# Patient Record
Sex: MALE | Race: WHITE | NOT HISPANIC OR LATINO | Employment: FULL TIME | URBAN - NONMETROPOLITAN AREA
[De-identification: names, ages, dates, MRNs, and addresses within clinical notes are randomized per-mention and may not be internally consistent; named-entity substitution may affect disease eponyms.]

---

## 2018-02-14 ENCOUNTER — LAB (OUTPATIENT)
Dept: LAB | Facility: OTHER | Age: 58
End: 2018-02-14

## 2018-02-14 DIAGNOSIS — Z12.5 SCREENING PSA (PROSTATE SPECIFIC ANTIGEN): ICD-10-CM

## 2018-02-14 DIAGNOSIS — Z00.00 ROUTINE GENERAL MEDICAL EXAMINATION AT A HEALTH CARE FACILITY: ICD-10-CM

## 2018-02-14 DIAGNOSIS — Z00.00 ROUTINE GENERAL MEDICAL EXAMINATION AT A HEALTH CARE FACILITY: Primary | ICD-10-CM

## 2018-02-14 LAB
ALBUMIN SERPL-MCNC: 4.4 G/DL (ref 3.2–5.5)
ALBUMIN/GLOB SERPL: 1.6 G/DL (ref 1–3)
ALP SERPL-CCNC: 33 U/L (ref 15–121)
ALT SERPL W P-5'-P-CCNC: 26 U/L (ref 10–60)
ANION GAP SERPL CALCULATED.3IONS-SCNC: 8 MMOL/L (ref 5–15)
AST SERPL-CCNC: 24 U/L (ref 10–60)
BASOPHILS # BLD AUTO: 0.03 10*3/MM3 (ref 0–0.2)
BASOPHILS NFR BLD AUTO: 0.6 % (ref 0–2)
BILIRUB SERPL-MCNC: 1.1 MG/DL (ref 0.2–1)
BUN BLD-MCNC: 15 MG/DL (ref 8–25)
BUN/CREAT SERPL: 16.7 (ref 7–25)
CALCIUM SPEC-SCNC: 8.8 MG/DL (ref 8.4–10.8)
CHLORIDE SERPL-SCNC: 107 MMOL/L (ref 100–112)
CHOLEST SERPL-MCNC: 196 MG/DL (ref 150–200)
CO2 SERPL-SCNC: 25 MMOL/L (ref 20–32)
CREAT BLD-MCNC: 0.9 MG/DL (ref 0.4–1.3)
DEPRECATED RDW RBC AUTO: 43.6 FL (ref 35.1–43.9)
EOSINOPHIL # BLD AUTO: 0.12 10*3/MM3 (ref 0–0.7)
EOSINOPHIL NFR BLD AUTO: 2.3 % (ref 0–7)
ERYTHROCYTE [DISTWIDTH] IN BLOOD BY AUTOMATED COUNT: 13.1 % (ref 11.5–14.5)
GFR SERPL CREATININE-BSD FRML MDRD: 86 ML/MIN/1.73 (ref 56–130)
GLOBULIN UR ELPH-MCNC: 2.8 GM/DL (ref 2.5–4.6)
GLUCOSE BLD-MCNC: 100 MG/DL (ref 70–100)
HCT VFR BLD AUTO: 43.9 % (ref 39–49)
HDLC SERPL-MCNC: 47 MG/DL (ref 35–100)
HGB BLD-MCNC: 15.1 G/DL (ref 13.7–17.3)
LDLC SERPL CALC-MCNC: 134 MG/DL
LDLC/HDLC SERPL: 2.85 {RATIO}
LYMPHOCYTES # BLD AUTO: 1.47 10*3/MM3 (ref 0.6–4.2)
LYMPHOCYTES NFR BLD AUTO: 27.7 % (ref 10–50)
MCH RBC QN AUTO: 32.1 PG (ref 26.5–34)
MCHC RBC AUTO-ENTMCNC: 34.4 G/DL (ref 31.5–36.3)
MCV RBC AUTO: 93.2 FL (ref 80–98)
MONOCYTES # BLD AUTO: 0.48 10*3/MM3 (ref 0–0.9)
MONOCYTES NFR BLD AUTO: 9 % (ref 0–12)
NEUTROPHILS # BLD AUTO: 3.21 10*3/MM3 (ref 2–8.6)
NEUTROPHILS NFR BLD AUTO: 60.4 % (ref 37–80)
PLATELET # BLD AUTO: 230 10*3/MM3 (ref 150–450)
PMV BLD AUTO: 9 FL (ref 8–12)
POTASSIUM BLD-SCNC: 3.9 MMOL/L (ref 3.4–5.4)
PROT SERPL-MCNC: 7.2 G/DL (ref 6.7–8.2)
PSA SERPL-MCNC: 0.78 NG/ML (ref 0–4)
RBC # BLD AUTO: 4.71 10*6/MM3 (ref 4.37–5.74)
SODIUM BLD-SCNC: 140 MMOL/L (ref 134–146)
T4 FREE SERPL-MCNC: 1.03 NG/DL (ref 0.78–2.19)
TRIGL SERPL-MCNC: 76 MG/DL (ref 35–160)
TSH SERPL DL<=0.05 MIU/L-ACNC: 2 MIU/ML (ref 0.46–4.68)
VLDLC SERPL-MCNC: 15.2 MG/DL
WBC NRBC COR # BLD: 5.31 10*3/MM3 (ref 3.2–9.8)

## 2018-02-14 PROCEDURE — 80061 LIPID PANEL: CPT | Performed by: INTERNAL MEDICINE

## 2018-02-14 PROCEDURE — 80053 COMPREHEN METABOLIC PANEL: CPT | Performed by: INTERNAL MEDICINE

## 2018-02-14 PROCEDURE — 85025 COMPLETE CBC W/AUTO DIFF WBC: CPT | Performed by: INTERNAL MEDICINE

## 2018-02-14 PROCEDURE — 84443 ASSAY THYROID STIM HORMONE: CPT | Performed by: INTERNAL MEDICINE

## 2018-02-14 PROCEDURE — 36415 COLL VENOUS BLD VENIPUNCTURE: CPT | Performed by: INTERNAL MEDICINE

## 2018-02-14 PROCEDURE — 84439 ASSAY OF FREE THYROXINE: CPT | Performed by: INTERNAL MEDICINE

## 2018-02-14 PROCEDURE — G0103 PSA SCREENING: HCPCS | Performed by: INTERNAL MEDICINE

## 2018-02-16 ENCOUNTER — OFFICE VISIT (OUTPATIENT)
Dept: FAMILY MEDICINE CLINIC | Facility: CLINIC | Age: 58
End: 2018-02-16

## 2018-02-16 VITALS
HEART RATE: 70 BPM | OXYGEN SATURATION: 98 % | DIASTOLIC BLOOD PRESSURE: 80 MMHG | BODY MASS INDEX: 29.01 KG/M2 | TEMPERATURE: 96.9 F | HEIGHT: 71 IN | WEIGHT: 207.2 LBS | SYSTOLIC BLOOD PRESSURE: 128 MMHG

## 2018-02-16 DIAGNOSIS — Z56.6 STRESS AT WORK: ICD-10-CM

## 2018-02-16 DIAGNOSIS — Z82.49 FAMILY HISTORY OF CORONARY ARTERY DISEASE: Chronic | ICD-10-CM

## 2018-02-16 DIAGNOSIS — R00.2 PALPITATIONS: Chronic | ICD-10-CM

## 2018-02-16 DIAGNOSIS — R06.09 DYSPNEA ON EXERTION: Primary | ICD-10-CM

## 2018-02-16 DIAGNOSIS — R07.89 CHEST WALL PAIN: ICD-10-CM

## 2018-02-16 PROCEDURE — 99214 OFFICE O/P EST MOD 30 MIN: CPT | Performed by: INTERNAL MEDICINE

## 2018-02-16 PROCEDURE — 93000 ELECTROCARDIOGRAM COMPLETE: CPT | Performed by: INTERNAL MEDICINE

## 2018-02-16 SDOH — HEALTH STABILITY - MENTAL HEALTH: OTHER PHYSICAL AND MENTAL STRAIN RELATED TO WORK: Z56.6

## 2018-02-16 NOTE — PROGRESS NOTES
Subjective     History of Present Illness     Ayan Shipman is a 57 y.o. male who is a regular patient of Dr. Merlin Marshall who reports occasional episodes of relatively mild dyspnea.  It is usually dyspnea with exertion, but he has experienced some episodes when in bed.  He denies traditional chest pain or pressure, although, he also reports 3-4 week history of intermittent episodes of feeling lightheaded.  He has occasionally experienced some right-sided chest her chest wall pain with inspirations.  These are relatively mild and rare.  He is concerned due to strong family history of stroke and heart attack.  One of his brothers had a heart attack two years ago.  His oldest brother is  from an apparent postoperative arrhythmia.      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   He reports he was sitting in the truck about one month ago working on the computer when he felt lightheaded, which resolved in a few seconds.  He used to read meters, which required a lot of walking.  His physical activity has decreased with the change in his job requirements, although, it is more stressful.  He was concerned stress is a factor in his current symptoms.  He has had occasional palpitations for years.  He wore a Holter monitor a few years ago and was given reassurance.  A week ago, he awoke with dyspnea, which improved the next day, but he has noticed recurrence occasionally since Saturday.  His oxygen saturation today is 98%.  He reports he sleeps well.  He denies alcohol or tabacco use.       He had fasting labs two days ago ordered by Dr. Marshall prior to his scheduled wellness exam next week.  Labs appear unremarkable, with no evidence of anemia or electrolyte abnormalities.    Total bilirubin is 1.1.        Review of Systems   Constitutional: Negative for chills, fatigue and fever.   HENT: Negative for congestion, ear pain, postnasal drip, sinus pressure and sore throat.    Respiratory: Positive for  "shortness of breath. Negative for cough and wheezing.    Cardiovascular: Negative for chest pain, palpitations and leg swelling.        Rare episodes of right lateral chest  pain with deep inspiration.   Gastrointestinal: Negative for abdominal pain, blood in stool, constipation, diarrhea, nausea and vomiting.   Endocrine: Negative for cold intolerance, heat intolerance, polydipsia and polyuria.   Genitourinary: Negative for dysuria, frequency, hematuria and urgency.   Skin: Negative for rash.   Neurological: Positive for light-headedness. Negative for syncope and weakness.   Psychiatric/Behavioral: Negative for dysphoric mood and sleep disturbance. The patient is not nervous/anxious.           Objective     Vitals:    02/16/18 1410   BP: 128/80   Pulse: 70   Temp: 96.9 °F (36.1 °C)   TempSrc: Oral   SpO2: 98%   Weight: 94 kg (207 lb 3.2 oz)   Height: 180.3 cm (71\")     Physical Exam   Constitutional: He is oriented to person, place, and time. He appears well-developed and well-nourished. No distress.   HENT:   Head: Normocephalic and atraumatic.   Nose: Right sinus exhibits no maxillary sinus tenderness and no frontal sinus tenderness. Left sinus exhibits no maxillary sinus tenderness and no frontal sinus tenderness.   Mouth/Throat: Uvula is midline, oropharynx is clear and moist and mucous membranes are normal. No oral lesions. No tonsillar exudate.   Eyes: Conjunctivae and EOM are normal. Pupils are equal, round, and reactive to light.   Neck: Trachea normal. Neck supple. No JVD present. Carotid bruit is not present. No tracheal deviation present. No thyroid mass and no thyromegaly present.   Cardiovascular: Normal rate, regular rhythm, normal heart sounds and intact distal pulses.   No extrasystoles are present. PMI is not displaced.    No murmur heard.  Pulmonary/Chest: Effort normal and breath sounds normal. No accessory muscle usage. No respiratory distress. He has no decreased breath sounds. He has no " wheezes. He has no rhonchi. He has no rales.   Abdominal: Soft. Bowel sounds are normal. He exhibits no distension. There is no hepatosplenomegaly. There is no tenderness.       Vascular Status -  His exam exhibits right foot vasculature normal. His exam exhibits no right foot edema. His exam exhibits left foot vasculature normal. His exam exhibits no left foot edema.  Lymphadenopathy:     He has no cervical adenopathy.   Neurological: He is alert and oriented to person, place, and time. No cranial nerve deficit. Coordination normal.   Skin: Skin is warm, dry and intact. No rash noted. No cyanosis. Nails show no clubbing.   Psychiatric: He has a normal mood and affect. His speech is normal and behavior is normal. Judgment and thought content normal.   Good eye contact.  Good insight.  He does not appear particularly stressed or depressed.   Vitals reviewed.      Future Appointments  Date Time Provider Department Center   2/16/2018 3:05 PM [facility] XR 1 [facility] X[facility] [place]   2/23/2018 8:45 AM Merlin Marshall MD [facility] POW None     Assessment/Plan        ECG 12 Lead  Date/Time: 2/16/2018 2:50 PM  Performed by: THELMA DE LA PAZ  Authorized by: THELMA DE LA PAZ   Comparison: not compared with previous ECG   Previous ECG: no previous ECG available  Rhythm: sinus rhythm  Rate: normal  Conduction: conduction normal  ST Segments: ST segments normal  T Waves: T waves normal  QRS axis: normal  Other: no other findings  Clinical impression: normal ECG            EKG is completed today and it is normal.  He will have chest x-ray today and we will refer to cardiologist.  With his risk factors including male over age 50 and strong family history, I recommended referral to Dr. Sullivan, cardiologist.  If his chronic episodic palpitations become more frequent or intense, he should mention this to Dr. Marshall or cardiologist.  We described worrisome cardiac/ anginal symptoms and recommended he seek immediate medical attention should  these occur.  He voices understanding and agreement.    I offered to start SSRI therapy due to his workplace stress and other life stressors.  He declines.  He wants to wait until he is evaluated by cardiology.        He has an appointment scheduled with Dr. Marshall next week for wellness exam.  He will discuss these issues with Dr. Polo and at that time.      Scribed for Dr. Marques by Whit Arreola [facility].     Diagnoses and all orders for this visit:    Dyspnea on exertion  -     XR Chest PA & Lateral  -     Ambulatory Referral to Cardiology    Chest wall pain  -     XR Chest PA & Lateral  -     Ambulatory Referral to Cardiology    Palpitations    Family history of coronary artery disease    Stress at work      Lab on 02/14/2018   Component Date Value Ref Range Status   • Free T4 02/14/2018 1.03  0.78 - 2.19 ng/dL Final   • Glucose 02/14/2018 100  70 - 100 mg/dL Final   • BUN 02/14/2018 15  8 - 25 mg/dL Final   • Creatinine 02/14/2018 0.90  0.40 - 1.30 mg/dL Final   • Sodium 02/14/2018 140  134 - 146 mmol/L Final   • Potassium 02/14/2018 3.9  3.4 - 5.4 mmol/L Final   • Chloride 02/14/2018 107  100 - 112 mmol/L Final   • CO2 02/14/2018 25.0  20.0 - 32.0 mmol/L Final   • Calcium 02/14/2018 8.8  8.4 - 10.8 mg/dL Final   • Total Protein 02/14/2018 7.2  6.7 - 8.2 g/dL Final   • Albumin 02/14/2018 4.40  3.20 - 5.50 g/dL Final   • ALT (SGPT) 02/14/2018 26  10 - 60 U/L Final   • AST (SGOT) 02/14/2018 24  10 - 60 U/L Final   • Alkaline Phosphatase 02/14/2018 33  15 - 121 U/L Final   • Total Bilirubin 02/14/2018 1.1* 0.2 - 1.0 mg/dL Final   • eGFR Non  Amer 02/14/2018 86  56 - 130 mL/min/1.73 Final   • Globulin 02/14/2018 2.8  2.5 - 4.6 gm/dL Final   • A/G Ratio 02/14/2018 1.6  1.0 - 3.0 g/dL Final   • BUN/Creatinine Ratio 02/14/2018 16.7  7.0 - 25.0 Final   • Anion Gap 02/14/2018 8.0  5.0 - 15.0 mmol/L Final   • PSA 02/14/2018 0.775  0.000 - 4.000 ng/mL Final   • Total Cholesterol 02/14/2018 196  150 - 200 mg/dL  Final   • Triglycerides 02/14/2018 76  35 - 160 mg/dL Final   • HDL Cholesterol 02/14/2018 47  35 - 100 mg/dL Final   • LDL Cholesterol  02/14/2018 134  mg/dL Final   • VLDL Cholesterol 02/14/2018 15.2  mg/dL Final   • LDL/HDL Ratio 02/14/2018 2.85   Final   • TSH 02/14/2018 2.000  0.460 - 4.680 mIU/mL Final   • WBC 02/14/2018 5.31  3.20 - 9.80 10*3/mm3 Final   • RBC 02/14/2018 4.71  4.37 - 5.74 10*6/mm3 Final   • Hemoglobin 02/14/2018 15.1  13.7 - 17.3 g/dL Final   • Hematocrit 02/14/2018 43.9  39.0 - 49.0 % Final   • MCV 02/14/2018 93.2  80.0 - 98.0 fL Final   • MCH 02/14/2018 32.1  26.5 - 34.0 pg Final   • MCHC 02/14/2018 34.4  31.5 - 36.3 g/dL Final   • RDW 02/14/2018 13.1  11.5 - 14.5 % Final   • RDW-SD 02/14/2018 43.6  35.1 - 43.9 fl Final   • MPV 02/14/2018 9.0  8.0 - 12.0 fL Final   • Platelets 02/14/2018 230  150 - 450 10*3/mm3 Final   • Neutrophil % 02/14/2018 60.4  37.0 - 80.0 % Final   • Lymphocyte % 02/14/2018 27.7  10.0 - 50.0 % Final   • Monocyte % 02/14/2018 9.0  0.0 - 12.0 % Final   • Eosinophil % 02/14/2018 2.3  0.0 - 7.0 % Final   • Basophil % 02/14/2018 0.6  0.0 - 2.0 % Final   • Neutrophils, Absolute 02/14/2018 3.21  2.00 - 8.60 10*3/mm3 Final   • Lymphocytes, Absolute 02/14/2018 1.47  0.60 - 4.20 10*3/mm3 Final   • Monocytes, Absolute 02/14/2018 0.48  0.00 - 0.90 10*3/mm3 Final   • Eosinophils, Absolute 02/14/2018 0.12  0.00 - 0.70 10*3/mm3 Final   • Basophils, Absolute 02/14/2018 0.03  0.00 - 0.20 10*3/mm3 Final   ]

## 2018-03-12 ENCOUNTER — OFFICE VISIT (OUTPATIENT)
Dept: FAMILY MEDICINE CLINIC | Facility: CLINIC | Age: 58
End: 2018-03-12

## 2018-03-12 VITALS
BODY MASS INDEX: 40.05 KG/M2 | HEIGHT: 60 IN | OXYGEN SATURATION: 99 % | SYSTOLIC BLOOD PRESSURE: 114 MMHG | DIASTOLIC BLOOD PRESSURE: 74 MMHG | WEIGHT: 204 LBS | HEART RATE: 78 BPM

## 2018-03-12 DIAGNOSIS — M15.9 PRIMARY OSTEOARTHRITIS INVOLVING MULTIPLE JOINTS: Primary | ICD-10-CM

## 2018-03-12 PROCEDURE — 99213 OFFICE O/P EST LOW 20 MIN: CPT | Performed by: INTERNAL MEDICINE

## 2018-03-12 RX ORDER — SILDENAFIL 100 MG/1
100 TABLET, FILM COATED ORAL DAILY PRN
Qty: 9 TABLET | Refills: 5 | Status: SHIPPED | OUTPATIENT
Start: 2018-03-12 | End: 2018-03-15 | Stop reason: SDUPTHER

## 2018-03-12 RX ORDER — MELOXICAM 15 MG/1
15 TABLET ORAL DAILY
Qty: 30 TABLET | Refills: 5 | Status: SHIPPED | OUTPATIENT
Start: 2018-03-12 | End: 2019-02-26 | Stop reason: SDUPTHER

## 2018-03-15 RX ORDER — SILDENAFIL 100 MG/1
100 TABLET, FILM COATED ORAL DAILY PRN
Qty: 8 TABLET | Refills: 5 | Status: SHIPPED | OUTPATIENT
Start: 2018-03-15 | End: 2019-02-26 | Stop reason: SDUPTHER

## 2019-02-18 DIAGNOSIS — Z12.5 ENCOUNTER FOR SPECIAL SCREENING EXAMINATION FOR NEOPLASM OF PROSTATE: ICD-10-CM

## 2019-02-18 DIAGNOSIS — Z00.00 ROUTINE GENERAL MEDICAL EXAMINATION AT A HEALTH CARE FACILITY: Primary | ICD-10-CM

## 2019-02-20 ENCOUNTER — LAB (OUTPATIENT)
Dept: LAB | Facility: OTHER | Age: 59
End: 2019-02-20

## 2019-02-20 DIAGNOSIS — Z00.00 ROUTINE GENERAL MEDICAL EXAMINATION AT A HEALTH CARE FACILITY: ICD-10-CM

## 2019-02-20 DIAGNOSIS — Z12.5 ENCOUNTER FOR SPECIAL SCREENING EXAMINATION FOR NEOPLASM OF PROSTATE: ICD-10-CM

## 2019-02-20 LAB
ALBUMIN SERPL-MCNC: 4.5 G/DL (ref 3.5–5)
ALBUMIN/GLOB SERPL: 1.7 G/DL (ref 1.1–1.8)
ALP SERPL-CCNC: 42 U/L (ref 38–126)
ALT SERPL W P-5'-P-CCNC: 25 U/L
ANION GAP SERPL CALCULATED.3IONS-SCNC: 8 MMOL/L (ref 5–15)
AST SERPL-CCNC: 27 U/L (ref 17–59)
BASOPHILS # BLD AUTO: 0.02 10*3/MM3 (ref 0–0.2)
BASOPHILS NFR BLD AUTO: 0.4 % (ref 0–2)
BILIRUB SERPL-MCNC: 0.8 MG/DL (ref 0.2–1.3)
BUN BLD-MCNC: 15 MG/DL (ref 9–20)
BUN/CREAT SERPL: 15 (ref 7–25)
CALCIUM SPEC-SCNC: 8.6 MG/DL (ref 8.4–10.2)
CHLORIDE SERPL-SCNC: 108 MMOL/L (ref 98–107)
CHOLEST SERPL-MCNC: 177 MG/DL (ref 150–200)
CO2 SERPL-SCNC: 26 MMOL/L (ref 22–30)
CREAT BLD-MCNC: 1 MG/DL (ref 0.66–1.25)
DEPRECATED RDW RBC AUTO: 43.9 FL (ref 35.1–43.9)
EOSINOPHIL # BLD AUTO: 0.14 10*3/MM3 (ref 0–0.7)
EOSINOPHIL NFR BLD AUTO: 2.8 % (ref 0–7)
ERYTHROCYTE [DISTWIDTH] IN BLOOD BY AUTOMATED COUNT: 13 % (ref 11.5–14.5)
GFR SERPL CREATININE-BSD FRML MDRD: 76 ML/MIN/1.73 (ref 49–113)
GLOBULIN UR ELPH-MCNC: 2.7 GM/DL (ref 2.3–3.5)
GLUCOSE BLD-MCNC: 103 MG/DL (ref 74–99)
HCT VFR BLD AUTO: 42.8 % (ref 39–49)
HDLC SERPL-MCNC: 51 MG/DL (ref 40–59)
HGB BLD-MCNC: 14.7 G/DL (ref 13.7–17.3)
LDLC SERPL CALC-MCNC: 108 MG/DL
LDLC/HDLC SERPL: 2.13 {RATIO} (ref 0–3.55)
LYMPHOCYTES # BLD AUTO: 1.57 10*3/MM3 (ref 0.6–4.2)
LYMPHOCYTES NFR BLD AUTO: 31.2 % (ref 10–50)
MCH RBC QN AUTO: 32.2 PG (ref 26.5–34)
MCHC RBC AUTO-ENTMCNC: 34.3 G/DL (ref 31.5–36.3)
MCV RBC AUTO: 93.9 FL (ref 80–98)
MONOCYTES # BLD AUTO: 0.42 10*3/MM3 (ref 0–0.9)
MONOCYTES NFR BLD AUTO: 8.3 % (ref 0–12)
NEUTROPHILS # BLD AUTO: 2.88 10*3/MM3 (ref 2–8.6)
NEUTROPHILS NFR BLD AUTO: 57.3 % (ref 37–80)
PLATELET # BLD AUTO: 216 10*3/MM3 (ref 150–450)
PMV BLD AUTO: 8.8 FL (ref 8–12)
POTASSIUM BLD-SCNC: 4.1 MMOL/L (ref 3.4–5)
PROT SERPL-MCNC: 7.2 G/DL (ref 6.3–8.2)
PSA SERPL-MCNC: 0.72 NG/ML (ref 0–4)
RBC # BLD AUTO: 4.56 10*6/MM3 (ref 4.37–5.74)
SODIUM BLD-SCNC: 142 MMOL/L (ref 137–145)
T4 FREE SERPL-MCNC: 0.97 NG/DL (ref 0.78–2.19)
TRIGL SERPL-MCNC: 88 MG/DL
TSH SERPL DL<=0.05 MIU/L-ACNC: 2.11 MIU/ML (ref 0.46–4.68)
VLDLC SERPL-MCNC: 17.6 MG/DL
WBC NRBC COR # BLD: 5.03 10*3/MM3 (ref 3.2–9.8)

## 2019-02-20 PROCEDURE — G0103 PSA SCREENING: HCPCS | Performed by: INTERNAL MEDICINE

## 2019-02-20 PROCEDURE — 36415 COLL VENOUS BLD VENIPUNCTURE: CPT | Performed by: INTERNAL MEDICINE

## 2019-02-20 PROCEDURE — 84439 ASSAY OF FREE THYROXINE: CPT | Performed by: INTERNAL MEDICINE

## 2019-02-20 PROCEDURE — 85025 COMPLETE CBC W/AUTO DIFF WBC: CPT | Performed by: INTERNAL MEDICINE

## 2019-02-20 PROCEDURE — 80061 LIPID PANEL: CPT | Performed by: INTERNAL MEDICINE

## 2019-02-20 PROCEDURE — 80053 COMPREHEN METABOLIC PANEL: CPT | Performed by: INTERNAL MEDICINE

## 2019-02-20 PROCEDURE — 84443 ASSAY THYROID STIM HORMONE: CPT | Performed by: INTERNAL MEDICINE

## 2019-02-26 ENCOUNTER — OFFICE VISIT (OUTPATIENT)
Dept: FAMILY MEDICINE CLINIC | Facility: CLINIC | Age: 59
End: 2019-02-26

## 2019-02-26 VITALS
BODY MASS INDEX: 29.4 KG/M2 | WEIGHT: 210 LBS | SYSTOLIC BLOOD PRESSURE: 128 MMHG | HEART RATE: 62 BPM | DIASTOLIC BLOOD PRESSURE: 80 MMHG | TEMPERATURE: 98 F | HEIGHT: 71 IN

## 2019-02-26 DIAGNOSIS — N52.9 ERECTILE DYSFUNCTION, UNSPECIFIED ERECTILE DYSFUNCTION TYPE: Chronic | ICD-10-CM

## 2019-02-26 DIAGNOSIS — M17.0 PRIMARY OSTEOARTHRITIS OF BOTH KNEES: Chronic | ICD-10-CM

## 2019-02-26 DIAGNOSIS — E66.3 OVERWEIGHT (BMI 25.0-29.9): Chronic | ICD-10-CM

## 2019-02-26 DIAGNOSIS — Z00.00 PHYSICAL EXAM, ANNUAL: Primary | ICD-10-CM

## 2019-02-26 DIAGNOSIS — Z82.49 FAMILY HISTORY OF CORONARY ARTERY DISEASE: Chronic | ICD-10-CM

## 2019-02-26 PROBLEM — R00.2 PALPITATIONS: Chronic | Status: RESOLVED | Noted: 2018-02-16 | Resolved: 2019-02-26

## 2019-02-26 PROCEDURE — 99396 PREV VISIT EST AGE 40-64: CPT | Performed by: INTERNAL MEDICINE

## 2019-02-26 RX ORDER — MELOXICAM 15 MG/1
15 TABLET ORAL DAILY
Qty: 90 TABLET | Refills: 3 | Status: SHIPPED | OUTPATIENT
Start: 2019-02-26 | End: 2020-03-02

## 2019-02-26 RX ORDER — SILDENAFIL 100 MG/1
100 TABLET, FILM COATED ORAL DAILY PRN
Qty: 24 TABLET | Refills: 3 | Status: SHIPPED | OUTPATIENT
Start: 2019-02-26 | End: 2020-03-02 | Stop reason: SDUPTHER

## 2019-02-26 NOTE — PROGRESS NOTES
Subjective          History of Present Illness     Ayan Shipman is a 58 y.o. male who comes in today to establish care.  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  He has been seen by Dr. Merlin Marshall in the past.  He and his wife live in [place] and attend [employer] at [employer].  His wife has been diagnosed with MS.  He struggles with osteoarthritic pain, especially the knees.  We discussed the need to use Tylenol, avoiding chronic NSAID use due to strong family history of CAD.   His PMH, meds, allergies, SH, and FH is reviewed today.  His father  at age 41 with ALS.  His mother had a stroke at age 84.  He has a paternal history of coronary disease, but denies any personal history of coronary artery disease. Denies chest pain.  He had a brother with diabetes who passed away with sudden cardiac death post-surgical procedure and he has another brother with history of MI.  Denies tobacco use. With his strong family history of CAD, we will try to keep patient at goal with cholesterol.  He is not currently on a statin, but LDL/HDL cholesterol ratio is favorable at 2.1.      He reports only rare symptoms of GERD/dyspepsia.  No dysphagia or melena.    He has fair complexion with a couple of actinic keratoses noted on his posterior neck.  He also has a subepidermal cyst on his left upper back.  He is given reassurance regarding the subepidermal cyst.  We can destroy the AKs with cryotherapy in the future, when he is ready.          Weight is stable.  Blood pressure and heart rate at goal.        He had a colonoscopy 2010 by Dr. Thakur.  No polyps were detected.  He will be due repeat colonoscopy 2020.       The patient's relevant past medical, surgical, and social history was reviewed in Epic.   Lab results are reviewed with the patient today.  Fasting glucose slightly above goal at 103.  Normal renal and liver function normal.  Normal thyroid function.  Total cholesterol 177.   "LDL  108.  HDL 51.  Triglycerides 88.   LDL/HDL ratio 2.13.  Normal PSA.       Review of Systems   Constitutional: Negative for chills, fatigue and fever.   HENT: Negative for congestion, ear pain, postnasal drip, sinus pressure and sore throat.    Respiratory: Negative for cough, shortness of breath and wheezing.    Cardiovascular: Negative for chest pain, palpitations and leg swelling.   Gastrointestinal: Negative for abdominal pain, blood in stool, constipation, diarrhea, nausea and vomiting.   Endocrine: Negative for cold intolerance, heat intolerance, polydipsia and polyuria.   Genitourinary: Negative for dysuria, frequency, hematuria and urgency.   Skin: Negative for rash.   Neurological: Negative for syncope and weakness.        Objective     Vitals:    02/26/19 1616   BP: 128/80   Pulse: 62   Temp: 98 °F (36.7 °C)   TempSrc: Oral   Weight: 95.3 kg (210 lb)   Height: 180.3 cm (71\")     Physical Exam   Constitutional: He is oriented to person, place, and time. He appears well-developed and well-nourished. No distress.   HENT:   Head: Normocephalic and atraumatic.   Nose: Right sinus exhibits no maxillary sinus tenderness and no frontal sinus tenderness. Left sinus exhibits no maxillary sinus tenderness and no frontal sinus tenderness.   Mouth/Throat: Uvula is midline, oropharynx is clear and moist and mucous membranes are normal. No oral lesions. No tonsillar exudate.   Clear postnasal drip.    Eyes: Conjunctivae and EOM are normal. Pupils are equal, round, and reactive to light.   Neck: Trachea normal. Neck supple. No JVD present. Carotid bruit is not present. No tracheal deviation present. No thyroid mass and no thyromegaly present.   Cardiovascular: Normal rate, regular rhythm, normal heart sounds and intact distal pulses.  No extrasystoles are present. PMI is not displaced.   No murmur heard.  Pulmonary/Chest: Effort normal and breath sounds normal. No accessory muscle usage. No respiratory distress. He " has no decreased breath sounds. He has no wheezes. He has no rhonchi. He has no rales.   Abdominal: Soft. Bowel sounds are normal. He exhibits no distension. There is no hepatosplenomegaly. There is no tenderness.   Overweight abdomen.      Vascular Status -  His right foot exhibits normal foot vasculature  and no edema. His left foot exhibits normal foot vasculature  and no edema.  Lymphadenopathy:     He has no cervical adenopathy.   Neurological: He is alert and oriented to person, place, and time. No cranial nerve deficit. Coordination normal.   Skin: Skin is warm, dry and intact. No rash noted. No cyanosis. Nails show no clubbing.   Psychiatric: He has a normal mood and affect. His speech is normal and behavior is normal. Judgment and thought content normal.   Vitals reviewed.         Assessment/Plan      Wellness screening forms are completed today and faxed to [facility]    He will be due repeat colonoscopy 03/2020.     He has a couple of AKs on his posterior neck, which we can destroy with cryotherapy whenever he is ready.         Recommended he use Tylenol for pain, avoiding chronic or frequent use of NSAIDs due strong family history of CAD.  I did refill his Mobic for episodic use.    Continue the use of generic Viagra for ED issues.    Pursue regular exercise/physical activity and calorie reduction with goal of losing approximately 10 pounds this year.       Return in one year for annual exam with fasting labs one week prior.     Scribed for Dr. Marques by Whit Arreola [facility].     Diagnoses and all orders for this visit:    Physical exam, annual    Primary osteoarthritis of both knees    Erectile dysfunction, unspecified erectile dysfunction type    Family history of coronary artery disease    Overweight (BMI 25.0-29.9)    Other orders  -     meloxicam (MOBIC) 15 MG tablet; Take 1 tablet by mouth Daily.  -     sildenafil (VIAGRA) 100 MG tablet; Take 1 tablet by mouth Daily As Needed for erectile  dysfunction.        Lab on 02/20/2019   Component Date Value Ref Range Status   • Glucose 02/20/2019 103* 74 - 99 mg/dL Final   • BUN 02/20/2019 15  9 - 20 mg/dL Final   • Creatinine 02/20/2019 1.00  0.66 - 1.25 mg/dL Final   • Sodium 02/20/2019 142  137 - 145 mmol/L Final   • Potassium 02/20/2019 4.1  3.4 - 5.0 mmol/L Final   • Chloride 02/20/2019 108* 98 - 107 mmol/L Final   • CO2 02/20/2019 26.0  22.0 - 30.0 mmol/L Final   • Calcium 02/20/2019 8.6  8.4 - 10.2 mg/dL Final   • Total Protein 02/20/2019 7.2  6.3 - 8.2 g/dL Final   • Albumin 02/20/2019 4.50  3.50 - 5.00 g/dL Final   • ALT (SGPT) 02/20/2019 25  <=50 U/L Final   • AST (SGOT) 02/20/2019 27  17 - 59 U/L Final   • Alkaline Phosphatase 02/20/2019 42  38 - 126 U/L Final   • Total Bilirubin 02/20/2019 0.8  0.2 - 1.3 mg/dL Final   • eGFR Non  Amer 02/20/2019 76  49 - 113 mL/min/1.73 Final   • Globulin 02/20/2019 2.7  2.3 - 3.5 gm/dL Final   • A/G Ratio 02/20/2019 1.7  1.1 - 1.8 g/dL Final   • BUN/Creatinine Ratio 02/20/2019 15.0  7.0 - 25.0 Final   • Anion Gap 02/20/2019 8.0  5.0 - 15.0 mmol/L Final   • Total Cholesterol 02/20/2019 177  150 - 200 mg/dL Final   • Triglycerides 02/20/2019 88  <=150 mg/dL Final   • HDL Cholesterol 02/20/2019 51  40 - 59 mg/dL Final   • LDL Cholesterol  02/20/2019 108* <=100 mg/dL Final   • VLDL Cholesterol 02/20/2019 17.6  mg/dL Final   • LDL/HDL Ratio 02/20/2019 2.13  0.00 - 3.55 Final   • TSH 02/20/2019 2.110  0.460 - 4.680 mIU/mL Final   • Free T4 02/20/2019 0.97  0.78 - 2.19 ng/dL Final   • PSA 02/20/2019 0.722  0.000 - 4.000 ng/mL Final   • WBC 02/20/2019 5.03  3.20 - 9.80 10*3/mm3 Final   • RBC 02/20/2019 4.56  4.37 - 5.74 10*6/mm3 Final   • Hemoglobin 02/20/2019 14.7  13.7 - 17.3 g/dL Final   • Hematocrit 02/20/2019 42.8  39.0 - 49.0 % Final   • MCV 02/20/2019 93.9  80.0 - 98.0 fL Final   • MCH 02/20/2019 32.2  26.5 - 34.0 pg Final   • MCHC 02/20/2019 34.3  31.5 - 36.3 g/dL Final   • RDW 02/20/2019 13.0  11.5 - 14.5  % Final   • RDW-SD 02/20/2019 43.9  35.1 - 43.9 fl Final   • MPV 02/20/2019 8.8  8.0 - 12.0 fL Final   • Platelets 02/20/2019 216  150 - 450 10*3/mm3 Final   • Neutrophil % 02/20/2019 57.3  37.0 - 80.0 % Final   • Lymphocyte % 02/20/2019 31.2  10.0 - 50.0 % Final   • Monocyte % 02/20/2019 8.3  0.0 - 12.0 % Final   • Eosinophil % 02/20/2019 2.8  0.0 - 7.0 % Final   • Basophil % 02/20/2019 0.4  0.0 - 2.0 % Final   • Neutrophils, Absolute 02/20/2019 2.88  2.00 - 8.60 10*3/mm3 Final   • Lymphocytes, Absolute 02/20/2019 1.57  0.60 - 4.20 10*3/mm3 Final   • Monocytes, Absolute 02/20/2019 0.42  0.00 - 0.90 10*3/mm3 Final   • Eosinophils, Absolute 02/20/2019 0.14  0.00 - 0.70 10*3/mm3 Final   • Basophils, Absolute 02/20/2019 0.02  0.00 - 0.20 10*3/mm3 Final   ]

## 2020-02-19 ENCOUNTER — PRIOR AUTHORIZATION (OUTPATIENT)
Dept: FAMILY MEDICINE CLINIC | Facility: CLINIC | Age: 60
End: 2020-02-19

## 2020-02-19 ENCOUNTER — LAB (OUTPATIENT)
Dept: LAB | Facility: OTHER | Age: 60
End: 2020-02-19

## 2020-02-19 DIAGNOSIS — Z00.00 ROUTINE GENERAL MEDICAL EXAMINATION AT A HEALTH CARE FACILITY: Primary | ICD-10-CM

## 2020-02-19 DIAGNOSIS — Z00.00 ROUTINE GENERAL MEDICAL EXAMINATION AT A HEALTH CARE FACILITY: ICD-10-CM

## 2020-02-19 LAB
ALBUMIN SERPL-MCNC: 4.4 G/DL (ref 3.5–5)
ALBUMIN/GLOB SERPL: 1.3 G/DL (ref 1.1–1.8)
ALP SERPL-CCNC: 39 U/L (ref 38–126)
ALT SERPL W P-5'-P-CCNC: 28 U/L
ANION GAP SERPL CALCULATED.3IONS-SCNC: 3 MMOL/L (ref 5–15)
AST SERPL-CCNC: 36 U/L (ref 17–59)
BASOPHILS # BLD AUTO: 0.03 10*3/MM3 (ref 0–0.2)
BASOPHILS NFR BLD AUTO: 0.5 % (ref 0–1.5)
BILIRUB SERPL-MCNC: 1.2 MG/DL (ref 0.2–1.3)
BUN BLD-MCNC: 15 MG/DL (ref 7–23)
BUN/CREAT SERPL: 15.6 (ref 7–25)
CALCIUM SPEC-SCNC: 9.2 MG/DL (ref 8.4–10.2)
CHLORIDE SERPL-SCNC: 106 MMOL/L (ref 101–112)
CO2 SERPL-SCNC: 29 MMOL/L (ref 22–30)
CREAT BLD-MCNC: 0.96 MG/DL (ref 0.7–1.3)
DEPRECATED RDW RBC AUTO: 44.2 FL (ref 37–54)
EOSINOPHIL # BLD AUTO: 0.14 10*3/MM3 (ref 0–0.4)
EOSINOPHIL NFR BLD AUTO: 2.2 % (ref 0.3–6.2)
ERYTHROCYTE [DISTWIDTH] IN BLOOD BY AUTOMATED COUNT: 13.2 % (ref 12.3–15.4)
GFR SERPL CREATININE-BSD FRML MDRD: 80 ML/MIN/1.73 (ref 49–113)
GLOBULIN UR ELPH-MCNC: 3.3 GM/DL (ref 2.3–3.5)
GLUCOSE BLD-MCNC: 106 MG/DL (ref 70–99)
HCT VFR BLD AUTO: 45.4 % (ref 37.5–51)
HGB BLD-MCNC: 15.5 G/DL (ref 13–17.7)
LYMPHOCYTES # BLD AUTO: 1.63 10*3/MM3 (ref 0.7–3.1)
LYMPHOCYTES NFR BLD AUTO: 25.7 % (ref 19.6–45.3)
MCH RBC QN AUTO: 32 PG (ref 26.6–33)
MCHC RBC AUTO-ENTMCNC: 34.1 G/DL (ref 31.5–35.7)
MCV RBC AUTO: 93.6 FL (ref 79–97)
MONOCYTES # BLD AUTO: 0.51 10*3/MM3 (ref 0.1–0.9)
MONOCYTES NFR BLD AUTO: 8 % (ref 5–12)
NEUTROPHILS # BLD AUTO: 4.03 10*3/MM3 (ref 1.7–7)
NEUTROPHILS NFR BLD AUTO: 63.6 % (ref 42.7–76)
PLATELET # BLD AUTO: 206 10*3/MM3 (ref 140–450)
PMV BLD AUTO: 8.7 FL (ref 6–12)
POTASSIUM BLD-SCNC: 4.1 MMOL/L (ref 3.4–5)
PROT SERPL-MCNC: 7.7 G/DL (ref 6.3–8.6)
PSA SERPL-MCNC: 0.93 NG/ML (ref 0–4)
RBC # BLD AUTO: 4.85 10*6/MM3 (ref 4.14–5.8)
SODIUM BLD-SCNC: 138 MMOL/L (ref 137–145)
T4 FREE SERPL-MCNC: 1.18 NG/DL (ref 0.93–1.7)
TSH SERPL DL<=0.05 MIU/L-ACNC: 2.43 UIU/ML (ref 0.27–4.2)
WBC NRBC COR # BLD: 6.34 10*3/MM3 (ref 3.4–10.8)

## 2020-02-19 PROCEDURE — 80053 COMPREHEN METABOLIC PANEL: CPT | Performed by: INTERNAL MEDICINE

## 2020-02-19 PROCEDURE — G0103 PSA SCREENING: HCPCS | Performed by: INTERNAL MEDICINE

## 2020-02-19 PROCEDURE — 85025 COMPLETE CBC W/AUTO DIFF WBC: CPT | Performed by: INTERNAL MEDICINE

## 2020-02-19 PROCEDURE — 84443 ASSAY THYROID STIM HORMONE: CPT | Performed by: INTERNAL MEDICINE

## 2020-02-19 PROCEDURE — 84439 ASSAY OF FREE THYROXINE: CPT | Performed by: INTERNAL MEDICINE

## 2020-02-19 PROCEDURE — 36415 COLL VENOUS BLD VENIPUNCTURE: CPT | Performed by: INTERNAL MEDICINE

## 2020-02-19 NOTE — TELEPHONE ENCOUNTER
PA for sildenafil (VIAGRA) 100 MG tablet will be completed after pt has been seen by Dr. Marques and a new script has been sent

## 2020-03-02 ENCOUNTER — OFFICE VISIT (OUTPATIENT)
Dept: FAMILY MEDICINE CLINIC | Facility: CLINIC | Age: 60
End: 2020-03-02

## 2020-03-02 VITALS
TEMPERATURE: 97.9 F | BODY MASS INDEX: 29.51 KG/M2 | WEIGHT: 210.8 LBS | HEIGHT: 71 IN | DIASTOLIC BLOOD PRESSURE: 80 MMHG | HEART RATE: 64 BPM | SYSTOLIC BLOOD PRESSURE: 126 MMHG

## 2020-03-02 DIAGNOSIS — Z00.00 ROUTINE GENERAL MEDICAL EXAMINATION AT A HEALTH CARE FACILITY: ICD-10-CM

## 2020-03-02 DIAGNOSIS — Z82.49 FAMILY HISTORY OF CORONARY ARTERY DISEASE: Chronic | ICD-10-CM

## 2020-03-02 DIAGNOSIS — M17.0 PRIMARY OSTEOARTHRITIS OF BOTH KNEES: Primary | Chronic | ICD-10-CM

## 2020-03-02 DIAGNOSIS — Z12.5 SPECIAL SCREENING FOR MALIGNANT NEOPLASM OF PROSTATE: ICD-10-CM

## 2020-03-02 DIAGNOSIS — Z12.11 COLON CANCER SCREENING: ICD-10-CM

## 2020-03-02 DIAGNOSIS — N52.9 ERECTILE DYSFUNCTION, UNSPECIFIED ERECTILE DYSFUNCTION TYPE: Chronic | ICD-10-CM

## 2020-03-02 DIAGNOSIS — E66.3 OVERWEIGHT (BMI 25.0-29.9): Chronic | ICD-10-CM

## 2020-03-02 PROCEDURE — 99396 PREV VISIT EST AGE 40-64: CPT | Performed by: INTERNAL MEDICINE

## 2020-03-02 RX ORDER — ASPIRIN 81 MG/1
81 TABLET, CHEWABLE ORAL DAILY
COMMUNITY
End: 2020-03-02

## 2020-03-02 RX ORDER — SILDENAFIL 100 MG/1
100 TABLET, FILM COATED ORAL DAILY PRN
Qty: 24 TABLET | Refills: 3 | Status: SHIPPED | OUTPATIENT
Start: 2020-03-02 | End: 2023-01-12

## 2020-03-02 NOTE — PROGRESS NOTES
Subjective        History of Present Illness     Ayan Shipman is a 59 y.o. male who comes in for annual exam.  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  He and his wife live in [place] and attend [employer] at [facility].  His wife has been diagnosed with MS. He brings in physical forms to be completed for annual wellness exam, which will provide the patient with premium discount on health insurance benefits, although, lipid panel was inadvertently omitted from labs.  He does agree to stop back by in a couple of days to have labs drawn for lipid panel.  We can then fax the completed form to his insurance.      He denies GERD symptoms.      He struggles with osteoarthritis of the bilateral knees, but is able to sleep well without pain waking him.  The knee pain is worse with initial ambulation, but tends to improve with additional activity.  I recommended OTC Aleve with food as needed for pain relief.      He is due repeat colonoscopy and agrees to schedule this.  He had a colonoscopy 03/17/2010 by Dr. Thakur.  No polyps were detected.     He is asking if he should continue daily 81 mg aspirin.  After reviewing patient risks for coronary artery disease versus risk of complication with the aspirin, I recommended he stop the daily low dose aspirin.  Denies chest pain.  He uses no tobacco products.    He had influenza vaccine through his employer, [employer], last fall (10/2019)    Labs reveal impaired fasting glucose.    He has a family history of diabetes in half brother.  We reviewed dietary principles to help delay progression of impaired fasting glucose including decreasing sugar and carbohydrates.  He does report frequently having a honey bun for breakfast.          Weight is stable from one year ago.  BMI 29.4.  Blood pressure at goal.      The patient's relevant past medical, surgical, and social history was reviewed in Epic.   Lab results are reviewed with the patient today.  CBC  "unremarkable.  Fasting glucose 106.  Normal renal and liver function.   PSA normal limits.  Normal thyroid function.        Review of Systems   Constitutional: Negative for chills, fatigue and fever.   HENT: Negative for congestion, ear pain, postnasal drip, sinus pressure and sore throat.    Respiratory: Negative for cough, shortness of breath and wheezing.    Cardiovascular: Negative for chest pain, palpitations and leg swelling.   Gastrointestinal: Negative for abdominal pain, blood in stool, constipation, diarrhea, nausea and vomiting.   Endocrine: Negative for cold intolerance, heat intolerance, polydipsia and polyuria.   Genitourinary: Negative for dysuria, frequency, hematuria and urgency.   Skin: Negative for rash.   Neurological: Negative for syncope and weakness.        Objective     Visit Vitals  /80   Pulse 64   Temp 97.9 °F (36.6 °C) (Oral)   Ht 180.3 cm (71\")   Wt 95.6 kg (210 lb 12.8 oz)   BMI 29.40 kg/m²     Physical Exam   Constitutional: He is oriented to person, place, and time. He appears well-developed and well-nourished. No distress.   Overweight male.    HENT:   Head: Normocephalic and atraumatic.   Nose: Right sinus exhibits no maxillary sinus tenderness and no frontal sinus tenderness. Left sinus exhibits no maxillary sinus tenderness and no frontal sinus tenderness.   Mouth/Throat: Uvula is midline, oropharynx is clear and moist and mucous membranes are normal. No oral lesions. No tonsillar exudate.   Eyes: Pupils are equal, round, and reactive to light. Conjunctivae and EOM are normal.   Neck: Trachea normal. Neck supple. No JVD present. Carotid bruit is not present. No tracheal deviation present. No thyroid mass and no thyromegaly present.   Cardiovascular: Normal rate, regular rhythm, normal heart sounds and intact distal pulses.  No extrasystoles are present. PMI is not displaced.   No murmur heard.  Pulmonary/Chest: Effort normal and breath sounds normal. No accessory muscle " usage. No respiratory distress. He has no decreased breath sounds. He has no wheezes. He has no rhonchi. He has no rales.   Abdominal: Soft. Bowel sounds are normal. He exhibits no distension. There is no hepatosplenomegaly. There is no tenderness.     Vascular Status -  His right foot exhibits normal foot vasculature  and no edema. His left foot exhibits normal foot vasculature  and no edema.  Lymphadenopathy:     He has no cervical adenopathy.   Neurological: He is alert and oriented to person, place, and time. No cranial nerve deficit. Coordination normal.   Skin: Skin is warm, dry and intact. No rash noted. No cyanosis. Nails show no clubbing.   Psychiatric: He has a normal mood and affect. His speech is normal and behavior is normal. Judgment and thought content normal.   Vitals reviewed.          Assessment/Plan      Refer to Dr. Kyle Thakur for repeat colonoscopy.      Recommended low-carbohydrate, low-sugar diet to delay progression of impaired fasting glucose and achieve weight loss.  Increase physical activity/exercise.  I recommended a gradual weight loss goal of losing 15-20 pounds to lower BMI.      Recommended OTC Aleve with food as needed for osteoarthritic knee pain.     He can stop the 81 mg aspirin as patient is not at increased risk for coronary artery disease.      He will stop by the lab in a couple of days to have blood drawn for lipid panel.  We will complete the wellness forms and fax for patient when we get results back from lipid panel.  We will contact him with the results.    A refill is sent for sildenafil to treat erectile dysfunction, which has been working well.     Return in one year for next annual wellness exam with fasting labs one week prior.      Scribed for Dr. Marques by Whit Arreola [facility].     Diagnoses and all orders for this visit:    Primary osteoarthritis of both knees    Overweight (BMI 25.0-29.9)  -     Lipid Panel; Future  -     CBC Auto Differential; Future  -      Comprehensive Metabolic Panel; Future  -     Lipid Panel; Future  -     TSH; Future    Family history of coronary artery disease  -     Lipid Panel; Future    Erectile dysfunction, unspecified erectile dysfunction type  -     Lipid Panel; Future    Colon cancer screening  -     Ambulatory Referral to Gastroenterology    Routine general medical examination at a health care facility  -     Lipid Panel; Future  -     CBC Auto Differential; Future  -     Comprehensive Metabolic Panel; Future  -     Lipid Panel; Future  -     TSH; Future  -     PSA Screen; Future    Special screening for malignant neoplasm of prostate  -     PSA Screen; Future    Other orders  -     Discontinue: aspirin 81 MG chewable tablet; Chew 81 mg Daily.  -     sildenafil (VIAGRA) 100 MG tablet; Take 1 tablet by mouth Daily As Needed for Erectile Dysfunction.        Lab on 02/19/2020   Component Date Value Ref Range Status   • Free T4 02/19/2020 1.18  0.93 - 1.70 ng/dL Final   • TSH 02/19/2020 2.430  0.270 - 4.200 uIU/mL Final   • Glucose 02/19/2020 106* 70 - 99 mg/dL Final   • BUN 02/19/2020 15  7 - 23 mg/dL Final   • Creatinine 02/19/2020 0.96  0.70 - 1.30 mg/dL Final   • Sodium 02/19/2020 138  137 - 145 mmol/L Final   • Potassium 02/19/2020 4.1  3.4 - 5.0 mmol/L Final   • Chloride 02/19/2020 106  101 - 112 mmol/L Final   • CO2 02/19/2020 29.0  22.0 - 30.0 mmol/L Final   • Calcium 02/19/2020 9.2  8.4 - 10.2 mg/dL Final   • Total Protein 02/19/2020 7.7  6.3 - 8.6 g/dL Final   • Albumin 02/19/2020 4.40  3.50 - 5.00 g/dL Final   • ALT (SGPT) 02/19/2020 28  <=50 U/L Final   • AST (SGOT) 02/19/2020 36  17 - 59 U/L Final   • Alkaline Phosphatase 02/19/2020 39  38 - 126 U/L Final   • Total Bilirubin 02/19/2020 1.2  0.2 - 1.3 mg/dL Final   • eGFR Non African Amer 02/19/2020 80  49 - 113 mL/min/1.73 Final   • Globulin 02/19/2020 3.3  2.3 - 3.5 gm/dL Final   • A/G Ratio 02/19/2020 1.3  1.1 - 1.8 g/dL Final   • BUN/Creatinine Ratio 02/19/2020 15.6  7.0 -  25.0 Final   • Anion Gap 02/19/2020 3.0* 5.0 - 15.0 mmol/L Final   • PSA 02/19/2020 0.925  0.000 - 4.000 ng/mL Final   • WBC 02/19/2020 6.34  3.40 - 10.80 10*3/mm3 Final   • RBC 02/19/2020 4.85  4.14 - 5.80 10*6/mm3 Final   • Hemoglobin 02/19/2020 15.5  13.0 - 17.7 g/dL Final   • Hematocrit 02/19/2020 45.4  37.5 - 51.0 % Final   • MCV 02/19/2020 93.6  79.0 - 97.0 fL Final   • MCH 02/19/2020 32.0  26.6 - 33.0 pg Final   • MCHC 02/19/2020 34.1  31.5 - 35.7 g/dL Final   • RDW 02/19/2020 13.2  12.3 - 15.4 % Final   • RDW-SD 02/19/2020 44.2  37.0 - 54.0 fl Final   • MPV 02/19/2020 8.7  6.0 - 12.0 fL Final   • Platelets 02/19/2020 206  140 - 450 10*3/mm3 Final   • Neutrophil % 02/19/2020 63.6  42.7 - 76.0 % Final   • Lymphocyte % 02/19/2020 25.7  19.6 - 45.3 % Final   • Monocyte % 02/19/2020 8.0  5.0 - 12.0 % Final   • Eosinophil % 02/19/2020 2.2  0.3 - 6.2 % Final   • Basophil % 02/19/2020 0.5  0.0 - 1.5 % Final   • Neutrophils, Absolute 02/19/2020 4.03  1.70 - 7.00 10*3/mm3 Final   • Lymphocytes, Absolute 02/19/2020 1.63  0.70 - 3.10 10*3/mm3 Final   • Monocytes, Absolute 02/19/2020 0.51  0.10 - 0.90 10*3/mm3 Final   • Eosinophils, Absolute 02/19/2020 0.14  0.00 - 0.40 10*3/mm3 Final   • Basophils, Absolute 02/19/2020 0.03  0.00 - 0.20 10*3/mm3 Final   ]

## 2020-03-03 ENCOUNTER — LAB (OUTPATIENT)
Dept: LAB | Facility: OTHER | Age: 60
End: 2020-03-03

## 2020-03-03 DIAGNOSIS — N52.9 ERECTILE DYSFUNCTION, UNSPECIFIED ERECTILE DYSFUNCTION TYPE: Chronic | ICD-10-CM

## 2020-03-03 DIAGNOSIS — E66.3 OVERWEIGHT (BMI 25.0-29.9): Chronic | ICD-10-CM

## 2020-03-03 DIAGNOSIS — Z00.00 ROUTINE GENERAL MEDICAL EXAMINATION AT A HEALTH CARE FACILITY: ICD-10-CM

## 2020-03-03 DIAGNOSIS — Z82.49 FAMILY HISTORY OF CORONARY ARTERY DISEASE: Chronic | ICD-10-CM

## 2020-03-03 LAB
CHOLEST SERPL-MCNC: 185 MG/DL (ref 150–200)
HDLC SERPL-MCNC: 43 MG/DL (ref 40–59)
LDLC SERPL CALC-MCNC: 122 MG/DL
LDLC/HDLC SERPL: 2.85 {RATIO} (ref 0–3.55)
TRIGL SERPL-MCNC: 98 MG/DL
VLDLC SERPL-MCNC: 19.6 MG/DL

## 2020-03-03 PROCEDURE — 80061 LIPID PANEL: CPT | Performed by: INTERNAL MEDICINE

## 2020-03-03 PROCEDURE — 36415 COLL VENOUS BLD VENIPUNCTURE: CPT | Performed by: INTERNAL MEDICINE

## 2020-03-05 ENCOUNTER — PRIOR AUTHORIZATION (OUTPATIENT)
Dept: FAMILY MEDICINE CLINIC | Facility: CLINIC | Age: 60
End: 2020-03-05

## 2020-03-05 NOTE — TELEPHONE ENCOUNTER
PA for sildenafil (VIAGRA) 100 MG tablet has been sent Key [id number]    PA for Sildenafil has been approved

## 2021-02-12 ENCOUNTER — LAB (OUTPATIENT)
Dept: LAB | Facility: OTHER | Age: 61
End: 2021-02-12

## 2021-03-02 ENCOUNTER — LAB (OUTPATIENT)
Dept: LAB | Facility: OTHER | Age: 61
End: 2021-03-02

## 2021-03-02 DIAGNOSIS — Z12.5 SPECIAL SCREENING FOR MALIGNANT NEOPLASM OF PROSTATE: ICD-10-CM

## 2021-03-02 DIAGNOSIS — E66.3 OVERWEIGHT (BMI 25.0-29.9): Chronic | ICD-10-CM

## 2021-03-02 DIAGNOSIS — Z00.00 ROUTINE GENERAL MEDICAL EXAMINATION AT A HEALTH CARE FACILITY: ICD-10-CM

## 2021-03-02 LAB
ALBUMIN SERPL-MCNC: 4.4 G/DL (ref 3.5–5)
ALBUMIN/GLOB SERPL: 1.6 G/DL (ref 1.1–1.8)
ALP SERPL-CCNC: 36 U/L (ref 38–126)
ALT SERPL W P-5'-P-CCNC: 30 U/L
ANION GAP SERPL CALCULATED.3IONS-SCNC: 7 MMOL/L (ref 5–15)
AST SERPL-CCNC: 31 U/L (ref 17–59)
BASOPHILS # BLD AUTO: 0.04 10*3/MM3 (ref 0–0.2)
BASOPHILS NFR BLD AUTO: 0.6 % (ref 0–1.5)
BILIRUB SERPL-MCNC: 0.8 MG/DL (ref 0.2–1.3)
BUN SERPL-MCNC: 13 MG/DL (ref 7–23)
BUN/CREAT SERPL: 14 (ref 7–25)
CALCIUM SPEC-SCNC: 9.1 MG/DL (ref 8.4–10.2)
CHLORIDE SERPL-SCNC: 106 MMOL/L (ref 101–112)
CHOLEST SERPL-MCNC: 196 MG/DL (ref 150–200)
CO2 SERPL-SCNC: 27 MMOL/L (ref 22–30)
CREAT SERPL-MCNC: 0.93 MG/DL (ref 0.7–1.3)
DEPRECATED RDW RBC AUTO: 45.4 FL (ref 37–54)
EOSINOPHIL # BLD AUTO: 0.15 10*3/MM3 (ref 0–0.4)
EOSINOPHIL NFR BLD AUTO: 2.4 % (ref 0.3–6.2)
ERYTHROCYTE [DISTWIDTH] IN BLOOD BY AUTOMATED COUNT: 13.6 % (ref 12.3–15.4)
GFR SERPL CREATININE-BSD FRML MDRD: 82 ML/MIN/1.73 (ref 49–113)
GLOBULIN UR ELPH-MCNC: 2.7 GM/DL (ref 2.3–3.5)
GLUCOSE SERPL-MCNC: 117 MG/DL (ref 70–99)
HCT VFR BLD AUTO: 44.6 % (ref 37.5–51)
HDLC SERPL-MCNC: 43 MG/DL (ref 40–59)
HGB BLD-MCNC: 15.5 G/DL (ref 13–17.7)
LDLC SERPL CALC-MCNC: 133 MG/DL
LDLC/HDLC SERPL: 3.05 {RATIO} (ref 0–3.55)
LYMPHOCYTES # BLD AUTO: 1.7 10*3/MM3 (ref 0.7–3.1)
LYMPHOCYTES NFR BLD AUTO: 27.3 % (ref 19.6–45.3)
MCH RBC QN AUTO: 32.8 PG (ref 26.6–33)
MCHC RBC AUTO-ENTMCNC: 34.8 G/DL (ref 31.5–35.7)
MCV RBC AUTO: 94.5 FL (ref 79–97)
MONOCYTES # BLD AUTO: 0.63 10*3/MM3 (ref 0.1–0.9)
MONOCYTES NFR BLD AUTO: 10.1 % (ref 5–12)
NEUTROPHILS NFR BLD AUTO: 3.71 10*3/MM3 (ref 1.7–7)
NEUTROPHILS NFR BLD AUTO: 59.6 % (ref 42.7–76)
PLATELET # BLD AUTO: 225 10*3/MM3 (ref 140–450)
PMV BLD AUTO: 8.8 FL (ref 6–12)
POTASSIUM SERPL-SCNC: 4.1 MMOL/L (ref 3.4–5)
PROT SERPL-MCNC: 7.1 G/DL (ref 6.3–8.6)
PSA SERPL-MCNC: 0.92 NG/ML (ref 0–4)
RBC # BLD AUTO: 4.72 10*6/MM3 (ref 4.14–5.8)
SODIUM SERPL-SCNC: 140 MMOL/L (ref 137–145)
TRIGL SERPL-MCNC: 110 MG/DL
TSH SERPL DL<=0.05 MIU/L-ACNC: 2.2 UIU/ML (ref 0.27–4.2)
VLDLC SERPL-MCNC: 20 MG/DL (ref 5–40)
WBC # BLD AUTO: 6.23 10*3/MM3 (ref 3.4–10.8)

## 2021-03-02 PROCEDURE — G0103 PSA SCREENING: HCPCS | Performed by: INTERNAL MEDICINE

## 2021-03-02 PROCEDURE — 80053 COMPREHEN METABOLIC PANEL: CPT | Performed by: INTERNAL MEDICINE

## 2021-03-02 PROCEDURE — 80061 LIPID PANEL: CPT | Performed by: INTERNAL MEDICINE

## 2021-03-02 PROCEDURE — 84443 ASSAY THYROID STIM HORMONE: CPT | Performed by: INTERNAL MEDICINE

## 2021-03-02 PROCEDURE — 85025 COMPLETE CBC W/AUTO DIFF WBC: CPT | Performed by: INTERNAL MEDICINE

## 2021-03-02 PROCEDURE — 36415 COLL VENOUS BLD VENIPUNCTURE: CPT | Performed by: INTERNAL MEDICINE

## 2021-03-08 ENCOUNTER — OFFICE VISIT (OUTPATIENT)
Dept: FAMILY MEDICINE CLINIC | Facility: CLINIC | Age: 61
End: 2021-03-08

## 2021-03-08 VITALS
HEIGHT: 71 IN | DIASTOLIC BLOOD PRESSURE: 80 MMHG | HEART RATE: 60 BPM | WEIGHT: 217.2 LBS | TEMPERATURE: 97 F | SYSTOLIC BLOOD PRESSURE: 124 MMHG | BODY MASS INDEX: 30.41 KG/M2

## 2021-03-08 DIAGNOSIS — M17.0 PRIMARY OSTEOARTHRITIS OF BOTH KNEES: Chronic | ICD-10-CM

## 2021-03-08 DIAGNOSIS — R73.01 IMPAIRED FASTING GLUCOSE: Chronic | ICD-10-CM

## 2021-03-08 DIAGNOSIS — Z12.5 SPECIAL SCREENING FOR MALIGNANT NEOPLASM OF PROSTATE: ICD-10-CM

## 2021-03-08 DIAGNOSIS — Z12.11 COLON CANCER SCREENING: ICD-10-CM

## 2021-03-08 DIAGNOSIS — R09.81 CHRONIC NASAL CONGESTION: Chronic | ICD-10-CM

## 2021-03-08 DIAGNOSIS — E66.09 CLASS 1 OBESITY DUE TO EXCESS CALORIES WITH SERIOUS COMORBIDITY AND BODY MASS INDEX (BMI) OF 30.0 TO 30.9 IN ADULT: Chronic | ICD-10-CM

## 2021-03-08 DIAGNOSIS — Z00.00 ROUTINE GENERAL MEDICAL EXAMINATION AT A HEALTH CARE FACILITY: Primary | ICD-10-CM

## 2021-03-08 PROBLEM — M20.41 HAMMERTOES OF BOTH FEET: Chronic | Status: ACTIVE | Noted: 2021-03-08

## 2021-03-08 PROBLEM — M20.42 HAMMERTOES OF BOTH FEET: Chronic | Status: ACTIVE | Noted: 2021-03-08

## 2021-03-08 PROCEDURE — 99396 PREV VISIT EST AGE 40-64: CPT | Performed by: INTERNAL MEDICINE

## 2021-03-08 RX ORDER — ASCORBIC ACID 100 MG
TABLET,CHEWABLE ORAL
COMMUNITY
End: 2021-07-01

## 2021-03-08 NOTE — PROGRESS NOTES
Chief Complaint  Annual Exam (Wellness. He hasnt done Colonoscopy d/t covid)    Subjective          History of Present Illness     Ayan Shipman presents to [facility] for annual exam.  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  He and his wife live in [place] and attend [employer] at [employer].  Denies high risk behaviors.  His wife struggles with MS, which has worsened the past year with noticeable increased fatigue.      He brings in physical forms to be completed for annual wellness exam through his employer [employer], which will provide the patient with premium discount on health insurance benefits.    He reports chronic congestion in left nostril, for which I recommended referral to ENT.  He is in agreement.     He reports burning pain to the toes of right foot.  Exam reveals development of hammertoes 2nd, 3rd, and 4th toes on the right foot.  He has recently purchased some cushioned insoles.       He is describing occasional numbness of right shoulder, which sounds to be radiating pain related to cervical osteoarthritis.  His father diet of ALS.     He reports bilateral knee pain, worse with squatting or bending..  He has a history of meniscus tear of left knee years ago.  He can walk without much difficulty.  We discussed benefits of weight loss and also discussed using glucosamine chondroitin to help improve bone health.       Colonoscopy scheduled for 06/2020 by Dr. Thakur was cancelled by patient due to the COVID pandemic.     Labs reveal impaired fasting glucose continues to trend up.  He has a family history of diabetes in an older brother.    Weight is up 7 pounds in the past year.  He admits to eating more desserts this past year during the COVID pandemic.  He has enjoyed baking brownies frequently at night.   Blood pressure and heart rate at goal.  With his diet choices and weight gain, cholesterol ratio and fasting glucose is worse.    He  "has not been vaccinated for COVID, which I have recommended.     The patient's relevant past medical, surgical, and social history was reviewed in Epic.   Lab results are reviewed with the patient today.  CBC unremarkable. Fasting glucose 106.  Normal renal and liver function.   Normal thyroid screen.  LDL approximately 10% higher at 133 increased from 122.  HDL stable, although, below goal at 43 and decreased from 51 in 2019.   PSA reveals no evidence of prostate cancer.        Objective   Vital Signs:   /80   Pulse 60   Temp 97 °F (36.1 °C) (Infrared)   Ht 180.3 cm (71\")   Wt 98.5 kg (217 lb 3.2 oz)   BMI 30.29 kg/m²       Physical Exam  Vitals reviewed.   Constitutional:       General: He is not in acute distress.     Appearance: He is well-developed.      Comments: Obese male.    HENT:      Head: Normocephalic and atraumatic.      Nose:      Right Sinus: No maxillary sinus tenderness or frontal sinus tenderness.      Left Sinus: No maxillary sinus tenderness or frontal sinus tenderness.      Mouth/Throat:      Mouth: No oral lesions.      Pharynx: Uvula midline.      Tonsils: No tonsillar exudate.   Eyes:      Conjunctiva/sclera: Conjunctivae normal.      Pupils: Pupils are equal, round, and reactive to light.   Neck:      Thyroid: No thyroid mass or thyromegaly.      Vascular: No carotid bruit or JVD.      Trachea: Trachea normal. No tracheal deviation.   Cardiovascular:      Rate and Rhythm: Normal rate and regular rhythm.  No extrasystoles are present.     Chest Wall: PMI is not displaced.      Heart sounds: Normal heart sounds. No murmur.   Pulmonary:      Effort: Pulmonary effort is normal. No accessory muscle usage or respiratory distress.      Breath sounds: Normal breath sounds. No decreased breath sounds, wheezing, rhonchi or rales.   Abdominal:      General: Bowel sounds are normal. There is no distension.      Palpations: Abdomen is soft.      Tenderness: There is no abdominal tenderness. "      Comments: Overweight abdomen limits exam.    Musculoskeletal:      Cervical back: Neck supple.   Feet:      Comments: Foot exam reveals hammertoes 2nd, 3rd, and 4th toes on the right foot.   Lymphadenopathy:      Cervical: No cervical adenopathy.   Skin:     General: Skin is warm and dry.      Findings: No rash.      Nails: There is no clubbing.   Neurological:      Mental Status: He is alert and oriented to person, place, and time.      Cranial Nerves: No cranial nerve deficit.      Coordination: Coordination normal.   Psychiatric:         Speech: Speech normal.         Behavior: Behavior normal.         Thought Content: Thought content normal.         Judgment: Judgment normal.            Result Review :     CMP    CMP 3/2/21   Glucose 117 (A)   BUN 13   Creatinine 0.93   eGFR Non African Am 82   Sodium 140   Potassium 4.1   Chloride 106   Calcium 9.1   Albumin 4.40   Total Bilirubin 0.8   Alkaline Phosphatase 36 (A)   AST (SGOT) 31   ALT (SGPT) 30   (A) Abnormal value            CBC w/diff    CBC w/Diff 3/2/21   WBC 6.23   RBC 4.72   Hemoglobin 15.5   Hematocrit 44.6   MCV 94.5   MCH 32.8   MCHC 34.8   RDW 13.6   Platelets 225   Neutrophil Rel % 59.6   Lymphocyte Rel % 27.3   Monocyte Rel % 10.1   Eosinophil Rel % 2.4   Basophil Rel % 0.6           Lipid Panel    Lipid Panel 3/2/21   Total Cholesterol 196   Triglycerides 110   HDL Cholesterol 43   VLDL Cholesterol 20   LDL Cholesterol  133 (A)   LDL/HDL Ratio 3.05   (A) Abnormal value            TSH    TSH 3/2/21   TSH 2.200               PSA    PSA 3/2/21   PSA 0.916                     Assessment and Plan    Diagnoses and all orders for this visit:    1. Routine general medical examination at a health care facility (Primary)  -     Ambulatory Referral to Gastroenterology  -     CBC Auto Differential; Future  -     Comprehensive Metabolic Panel; Future  -     Hemoglobin A1c; Future  -     Lipid Panel; Future  -     TSH; Future  -     PSA Screen;  Future    2. Primary osteoarthritis of both knees    3. Class 1 obesity due to excess calories with serious comorbidity and body mass index (BMI) of 30.0 to 30.9 in adult    4. Impaired fasting glucose  -     Hemoglobin A1c; Future    5. Colon cancer screening  -     Ambulatory Referral to Gastroenterology    6. Chronic nasal congestion  -     Ambulatory Referral to ENT (Otolaryngology)    7. Special screening for malignant neoplasm of prostate  -     PSA Screen; Future           Refer back to Dr. Kyle Thakur for repeat colonoscopy.  Colonoscopy cancelled by patient 06/2020 due to COVID pandemic.     Recommended he decrease carbohydrates and sugar in the diet to help delay progression of impaired fasting glucose.  We will add A1c with next set of labs.       Refer to Dr. Anders Boyce, ENT, for evaluation of chronic left nostril.      I recommended patient make sure he continues wearing supportive shoes with cushioned insoles adding metatarsal pads to help cushion the hammertoes and decrease pain/discomfort.  I also gave him the name of Power Step insoles when he needs to replace his current insoles.      For the knee pain, we discussed benefits of weight loss and also discussed using glucosamine chondroitin to help improve bone health.       Pursue low-cholesterol, low-fat diet to help lower cholesterol.   If cholesterol ratio remains above 2.5 with next year's labs, we will start patient on statin therapy.       We completed the physician screening form for annual wellness visit and faxed to [employer], patient's employer.      Return in one year for next annual wellness exam with fasting labs one week prior.       Scribed for Dr. Marques by Whit Arreola [facility].     Follow Up   Return in about 1 year (around 3/8/2022) for Next scheduled follow up.  Patient was given instructions and counseling regarding his condition or for health maintenance advice. Please see specific information pulled into the AVS if  appropriate.

## 2021-03-08 NOTE — PATIENT INSTRUCTIONS
Exercising to Lose Weight  Exercise is structured, repetitive physical activity to improve fitness and health. Getting regular exercise is important for everyone. It is especially important if you are overweight. Being overweight increases your risk of heart disease, stroke, diabetes, high blood pressure, and several types of cancer. Reducing your calorie intake and exercising can help you lose weight.  Exercise is usually categorized as moderate or vigorous intensity. To lose weight, most people need to do a certain amount of moderate-intensity or vigorous-intensity exercise each week.  Moderate-intensity exercise    Moderate-intensity exercise is any activity that gets you moving enough to burn at least three times more energy (calories) than if you were sitting.  Examples of moderate exercise include:  · Walking a mile in 15 minutes.  · Doing light yard work.  · Biking at an easy pace.  Most people should get at least 150 minutes (2 hours and 30 minutes) a week of moderate-intensity exercise to maintain their body weight.  Vigorous-intensity exercise  Vigorous-intensity exercise is any activity that gets you moving enough to burn at least six times more calories than if you were sitting. When you exercise at this intensity, you should be working hard enough that you are not able to carry on a conversation.  Examples of vigorous exercise include:  · Running.  · Playing a team sport, such as football, basketball, and soccer.  · Jumping rope.  Most people should get at least 75 minutes (1 hour and 15 minutes) a week of vigorous-intensity exercise to maintain their body weight.  How can exercise affect me?  When you exercise enough to burn more calories than you eat, you lose weight. Exercise also reduces body fat and builds muscle. The more muscle you have, the more calories you burn. Exercise also:  · Improves mood.  · Reduces stress and tension.  · Improves your overall fitness, flexibility, and  endurance.  · Increases bone strength.  The amount of exercise you need to lose weight depends on:  · Your age.  · The type of exercise.  · Any health conditions you have.  · Your overall physical ability.  Talk to your health care provider about how much exercise you need and what types of activities are safe for you.  What actions can I take to lose weight?  Nutrition    · Make changes to your diet as told by your health care provider or diet and nutrition specialist (dietitian). This may include:  ? Eating fewer calories.  ? Eating more protein.  ? Eating less unhealthy fats.  ? Eating a diet that includes fresh fruits and vegetables, whole grains, low-fat dairy products, and lean protein.  ? Avoiding foods with added fat, salt, and sugar.  · Drink plenty of water while you exercise to prevent dehydration or heat stroke.  Activity  · Choose an activity that you enjoy and set realistic goals. Your health care provider can help you make an exercise plan that works for you.  · Exercise at a moderate or vigorous intensity most days of the week.  ? The intensity of exercise may vary from person to person. You can tell how intense a workout is for you by paying attention to your breathing and heartbeat. Most people will notice their breathing and heartbeat get faster with more intense exercise.  · Do resistance training twice each week, such as:  ? Push-ups.  ? Sit-ups.  ? Lifting weights.  ? Using resistance bands.  · Getting short amounts of exercise can be just as helpful as long structured periods of exercise. If you have trouble finding time to exercise, try to include exercise in your daily routine.  ? Get up, stretch, and walk around every 30 minutes throughout the day.  ? Go for a walk during your lunch break.  ? Park your car farther away from your destination.  ? If you take public transportation, get off one stop early and walk the rest of the way.  ? Make phone calls while standing up and walking  around.  ? Take the stairs instead of elevators or escalators.  · Wear comfortable clothes and shoes with good support.  · Do not exercise so much that you hurt yourself, feel dizzy, or get very short of breath.  Where to find more information  · [place] Department of Health and Human Services: www.hhs.gov  · Centers for Disease Control and Prevention (CDC): www.cdc.gov  Contact a health care provider:  · Before starting a new exercise program.  · If you have questions or concerns about your weight.  · If you have a medical problem that keeps you from exercising.  Get help right away if you have any of the following while exercising:  · Injury.  · Dizziness.  · Difficulty breathing or shortness of breath that does not go away when you stop exercising.  · Chest pain.  · Rapid heartbeat.  Summary  · Being overweight increases your risk of heart disease, stroke, diabetes, high blood pressure, and several types of cancer.  · Losing weight happens when you burn more calories than you eat.  · Reducing the amount of calories you eat in addition to getting regular moderate or vigorous exercise each week helps you lose weight.  This information is not intended to replace advice given to you by your health care provider. Make sure you discuss any questions you have with your health care provider.  Document Revised: 12/31/2018 Document Reviewed: 12/31/2018  Elsevier Patient Education © 2020 [employer]      Calorie Counting for Weight Loss  Calories are units of energy. Your body needs a certain amount of calories from food to keep you going throughout the day. When you eat more calories than your body needs, your body stores the extra calories as fat. When you eat fewer calories than your body needs, your body burns fat to get the energy it needs.  Calorie counting means keeping track of how many calories you eat and drink each day. Calorie counting can be helpful if you need to lose weight. If you make sure to eat fewer  calories than your body needs, you should lose weight. Ask your health care provider what a healthy weight is for you.  For calorie counting to work, you will need to eat the right number of calories in a day in order to lose a healthy amount of weight per week. A dietitian can help you determine how many calories you need in a day and will give you suggestions on how to reach your calorie goal.  · A healthy amount of weight to lose per week is usually 1-2 lb (0.5-0.9 kg). This usually means that your daily calorie intake should be reduced by 500-750 calories.  · Eating 1,200 - 1,500 calories per day can help most women lose weight.  · Eating 1,500 - 1,800 calories per day can help most men lose weight.  What is my plan?  My goal is to have __________ calories per day.  If I have this many calories per day, I should lose around __________ pounds per week.  What do I need to know about calorie counting?  In order to meet your daily calorie goal, you will need to:  · Find out how many calories are in each food you would like to eat. Try to do this before you eat.  · Decide how much of the food you plan to eat.  · Write down what you ate and how many calories it had. Doing this is called keeping a food log.  To successfully lose weight, it is important to balance calorie counting with a healthy lifestyle that includes regular activity. Aim for 150 minutes of moderate exercise (such as walking) or 75 minutes of vigorous exercise (such as running) each week.  Where do I find calorie information?    The number of calories in a food can be found on a Nutrition Facts label. If a food does not have a Nutrition Facts label, try to look up the calories online or ask your dietitian for help.  Remember that calories are listed per serving. If you choose to have more than one serving of a food, you will have to multiply the calories per serving by the amount of servings you plan to eat. For example, the label on a package of  "bread might say that a serving size is 1 slice and that there are 90 calories in a serving. If you eat 1 slice, you will have eaten 90 calories. If you eat 2 slices, you will have eaten 180 calories.  How do I keep a food log?  Immediately after each meal, record the following information in your food log:  · What you ate. Don't forget to include toppings, sauces, and other extras on the food.  · How much you ate. This can be measured in cups, ounces, or number of items.  · How many calories each food and drink had.  · The total number of calories in the meal.  Keep your food log near you, such as in a small notebook in your pocket, or use a mobile hilario or website. Some programs will calculate calories for you and show you how many calories you have left for the day to meet your goal.  What are some calorie counting tips?    · Use your calories on foods and drinks that will fill you up and not leave you hungry:  ? Some examples of foods that fill you up are nuts and nut butters, vegetables, lean proteins, and high-fiber foods like whole grains. High-fiber foods are foods with more than 5 g fiber per serving.  ? Drinks such as sodas, specialty coffee drinks, alcohol, and juices have a lot of calories, yet do not fill you up.  · Eat nutritious foods and avoid empty calories. Empty calories are calories you get from foods or beverages that do not have many vitamins or protein, such as candy, sweets, and soda. It is better to have a nutritious high-calorie food (such as an avocado) than a food with few nutrients (such as a bag of chips).  · Know how many calories are in the foods you eat most often. This will help you calculate calorie counts faster.  · Pay attention to calories in drinks. Low-calorie drinks include water and unsweetened drinks.  · Pay attention to nutrition labels for \"low fat\" or \"fat free\" foods. These foods sometimes have the same amount of calories or more calories than the full fat versions. They " also often have added sugar, starch, or salt, to make up for flavor that was removed with the fat.  · Find a way of tracking calories that works for you. Get creative. Try different apps or programs if writing down calories does not work for you.  What are some portion control tips?  · Know how many calories are in a serving. This will help you know how many servings of a certain food you can have.  · Use a measuring cup to measure serving sizes. You could also try weighing out portions on a kitchen scale. With time, you will be able to estimate serving sizes for some foods.  · Take some time to put servings of different foods on your favorite plates, bowls, and cups so you know what a serving looks like.  · Try not to eat straight from a bag or box. Doing this can lead to overeating. Put the amount you would like to eat in a cup or on a plate to make sure you are eating the right portion.  · Use smaller plates, glasses, and bowls to prevent overeating.  · Try not to multitask (for example, watch TV or use your computer) while eating. If it is time to eat, sit down at a table and enjoy your food. This will help you to know when you are full. It will also help you to be aware of what you are eating and how much you are eating.  What are tips for following this plan?  Reading food labels  · Check the calorie count compared to the serving size. The serving size may be smaller than what you are used to eating.  · Check the source of the calories. Make sure the food you are eating is high in vitamins and protein and low in saturated and trans fats.  Shopping  · Read nutrition labels while you shop. This will help you make healthy decisions before you decide to purchase your food.  · Make a grocery list and stick to it.  Cooking  · Try to cook your favorite foods in a healthier way. For example, try baking instead of frying.  · Use low-fat dairy products.  Meal planning  · Use more fruits and vegetables. Half of your  "plate should be fruits and vegetables.  · Include lean proteins like poultry and fish.  How do I count calories when eating out?  · Ask for smaller portion sizes.  · Consider sharing an entree and sides instead of getting your own entree.  · If you get your own entree, eat only half. Ask for a box at the beginning of your meal and put the rest of your entree in it so you are not tempted to eat it.  · If calories are listed on the menu, choose the lower calorie options.  · Choose dishes that include vegetables, fruits, whole grains, low-fat dairy products, and lean protein.  · Choose items that are boiled, broiled, grilled, or steamed. Stay away from items that are buttered, battered, fried, or served with cream sauce. Items labeled \"crispy\" are usually fried, unless stated otherwise.  · Choose water, low-fat milk, unsweetened iced tea, or other drinks without added sugar. If you want an alcoholic beverage, choose a lower calorie option such as a glass of wine or light beer.  · Ask for dressings, sauces, and syrups on the side. These are usually high in calories, so you should limit the amount you eat.  · If you want a salad, choose a garden salad and ask for grilled meats. Avoid extra toppings like sandhu, cheese, or fried items. Ask for the dressing on the side, or ask for olive oil and vinegar or lemon to use as dressing.  · Estimate how many servings of a food you are given. For example, a serving of cooked rice is ½ cup or about the size of half a baseball. Knowing serving sizes will help you be aware of how much food you are eating at restaurants. The list below tells you how big or small some common portion sizes are based on everyday objects:  ? 1 oz--4 stacked dice.  ? 3 oz--1 deck of cards.  ? 1 tsp--1 die.  ? 1 Tbsp--½ a ping-pong ball.  ? 2 Tbsp--1 ping-pong ball.  ? ½ cup--½ baseball.  ? 1 cup--1 baseball.  Summary  · Calorie counting means keeping track of how many calories you eat and drink each day. If " you eat fewer calories than your body needs, you should lose weight.  · A healthy amount of weight to lose per week is usually 1-2 lb (0.5-0.9 kg). This usually means reducing your daily calorie intake by 500-750 calories.  · The number of calories in a food can be found on a Nutrition Facts label. If a food does not have a Nutrition Facts label, try to look up the calories online or ask your dietitian for help.  · Use your calories on foods and drinks that will fill you up, and not on foods and drinks that will leave you hungry.  · Use smaller plates, glasses, and bowls to prevent overeating.  This information is not intended to replace advice given to you by your health care provider. Make sure you discuss any questions you have with your health care provider.  Document Revised: 09/06/2019 Document Reviewed: 11/17/2017  Elsevier Patient Education © 2020 [employer]

## 2021-07-02 ENCOUNTER — ANESTHESIA (OUTPATIENT)
Dept: GASTROENTEROLOGY | Facility: HOSPITAL | Age: 61
End: 2021-07-02

## 2021-07-02 ENCOUNTER — HOSPITAL ENCOUNTER (OUTPATIENT)
Facility: HOSPITAL | Age: 61
Setting detail: HOSPITAL OUTPATIENT SURGERY
Discharge: HOME | End: 2021-07-02
Attending: INTERNAL MEDICINE | Admitting: INTERNAL MEDICINE

## 2021-07-02 ENCOUNTER — ANESTHESIA EVENT (OUTPATIENT)
Dept: GASTROENTEROLOGY | Facility: HOSPITAL | Age: 61
End: 2021-07-02

## 2021-07-02 VITALS
HEART RATE: 70 BPM | HEIGHT: 71 IN | DIASTOLIC BLOOD PRESSURE: 60 MMHG | WEIGHT: 202 LBS | TEMPERATURE: 97.8 F | BODY MASS INDEX: 28.28 KG/M2 | SYSTOLIC BLOOD PRESSURE: 107 MMHG | OXYGEN SATURATION: 98 % | RESPIRATION RATE: 18 BRPM

## 2021-07-02 PROCEDURE — 25010000002 PROPOFOL 10 MG/ML EMULSION: Performed by: NURSE ANESTHETIST, CERTIFIED REGISTERED

## 2021-07-02 RX ORDER — PROMETHAZINE HYDROCHLORIDE 25 MG/1
25 TABLET ORAL ONCE AS NEEDED
Status: DISCONTINUED | OUTPATIENT
Start: 2021-07-02 | End: 2021-07-02 | Stop reason: HOSPADM

## 2021-07-02 RX ORDER — MEPERIDINE HYDROCHLORIDE 25 MG/ML
12.5 INJECTION INTRAMUSCULAR; INTRAVENOUS; SUBCUTANEOUS
Status: DISCONTINUED | OUTPATIENT
Start: 2021-07-02 | End: 2021-07-02 | Stop reason: HOSPADM

## 2021-07-02 RX ORDER — PROPOFOL 10 MG/ML
VIAL (ML) INTRAVENOUS AS NEEDED
Status: DISCONTINUED | OUTPATIENT
Start: 2021-07-02 | End: 2021-07-02 | Stop reason: SURG

## 2021-07-02 RX ORDER — LIDOCAINE HYDROCHLORIDE 20 MG/ML
INJECTION, SOLUTION INTRAVENOUS AS NEEDED
Status: DISCONTINUED | OUTPATIENT
Start: 2021-07-02 | End: 2021-07-02 | Stop reason: SURG

## 2021-07-02 RX ORDER — ONDANSETRON 2 MG/ML
4 INJECTION INTRAMUSCULAR; INTRAVENOUS ONCE AS NEEDED
Status: DISCONTINUED | OUTPATIENT
Start: 2021-07-02 | End: 2021-07-02 | Stop reason: HOSPADM

## 2021-07-02 RX ORDER — PROMETHAZINE HYDROCHLORIDE 25 MG/1
25 SUPPOSITORY RECTAL ONCE AS NEEDED
Status: DISCONTINUED | OUTPATIENT
Start: 2021-07-02 | End: 2021-07-02 | Stop reason: HOSPADM

## 2021-07-02 RX ORDER — DEXTROSE AND SODIUM CHLORIDE 5; .45 G/100ML; G/100ML
30 INJECTION, SOLUTION INTRAVENOUS CONTINUOUS PRN
Status: DISCONTINUED | OUTPATIENT
Start: 2021-07-02 | End: 2021-07-02 | Stop reason: HOSPADM

## 2021-07-02 RX ADMIN — DEXTROSE AND SODIUM CHLORIDE 30 ML/HR: 5; 450 INJECTION, SOLUTION INTRAVENOUS at 14:18

## 2021-07-02 RX ADMIN — PROPOFOL 20 MG: 10 INJECTION, EMULSION INTRAVENOUS at 14:55

## 2021-07-02 RX ADMIN — PROPOFOL 20 MG: 10 INJECTION, EMULSION INTRAVENOUS at 14:58

## 2021-07-02 RX ADMIN — PROPOFOL 80 MG: 10 INJECTION, EMULSION INTRAVENOUS at 14:53

## 2021-07-02 RX ADMIN — LIDOCAINE HYDROCHLORIDE 80 MG: 20 INJECTION, SOLUTION INTRAVENOUS at 14:53

## 2021-07-02 NOTE — ANESTHESIA POSTPROCEDURE EVALUATION
Patient: Ayan Shipman    Procedure Summary     Date: 07/02/21 Room / Location: [facility] ENDOSCOPY 2 / [facility] ENDOSCOPY    Anesthesia Start: 1441 Anesthesia Stop: 1504    Procedure: COLONOSCOPY (N/A ) Diagnosis:       Encounter for screening colonoscopy      (Encounter for screening colonoscopy [Z12.11])    Surgeons: Kyle Thakur DO Provider: Capo Snow CRNA    Anesthesia Type: MAC ASA Status: 3          Anesthesia Type: MAC    Vitals  No vitals data found for the desired time range.          Post Anesthesia Care and Evaluation    Patient location during evaluation: bedside  Patient participation: complete - patient cannot participate  Level of consciousness: awake  Pain score: 0  Pain management: adequate  Airway patency: patent  Anesthetic complications: No anesthetic complications  PONV Status: none  Cardiovascular status: acceptable  Respiratory status: acceptable  Hydration status: acceptable

## 2021-07-02 NOTE — ANESTHESIA PREPROCEDURE EVALUATION
Anesthesia Evaluation     NPO Solid Status: > 8 hours  NPO Liquid Status: > 8 hours           Airway   Mallampati: III  TM distance: >3 FB  Neck ROM: full  No difficulty expected  Dental - normal exam     Pulmonary - normal exam   Cardiovascular - normal exam    (+) hyperlipidemia,       Neuro/Psych  GI/Hepatic/Renal/Endo    (+) obesity, morbid obesity, GERD,      Musculoskeletal     Abdominal    Substance History      OB/GYN          Other   arthritis,                      Anesthesia Plan    ASA 3     MAC     intravenous induction     Anesthetic plan, all risks, benefits, and alternatives have been provided, discussed and informed consent has been obtained with: patient.

## 2021-09-27 ENCOUNTER — TELEPHONE (OUTPATIENT)
Dept: FAMILY MEDICINE CLINIC | Facility: CLINIC | Age: 61
End: 2021-09-27

## 2021-09-27 DIAGNOSIS — R05.9 COUGH: ICD-10-CM

## 2021-09-27 DIAGNOSIS — M54.9 OTHER ACUTE BACK PAIN: Primary | ICD-10-CM

## 2021-09-27 DIAGNOSIS — J18.9 PNEUMONIA DUE TO INFECTIOUS ORGANISM, UNSPECIFIED LATERALITY, UNSPECIFIED PART OF LUNG: ICD-10-CM

## 2021-09-27 NOTE — TELEPHONE ENCOUNTER
----- Message from Mo Marques MD sent at 9/27/2021  1:56 PM CDT -----  Okay.  EB  ----- Message -----  From: Grupo Dutton RN  Sent: 9/27/2021   1:23 PM CDT  To: Mo Marques MD    He was dx with Covid and pneumonia through Urgent care 08/28 and dx with Covid. Went back to Urgent care 09/08/21 with pneumonia. His last xray chest was 09/08 and I don't see where he had anymore. Wife states he is having some back pain and he wants to make sure it isnt his lungs. He has private insurance. They were just requesting a chest xray. Grupo

## 2021-09-28 ENCOUNTER — TELEPHONE (OUTPATIENT)
Dept: FAMILY MEDICINE CLINIC | Facility: CLINIC | Age: 61
End: 2021-09-28

## 2021-09-28 DIAGNOSIS — J18.9 PNEUMONIA DUE TO INFECTIOUS ORGANISM, UNSPECIFIED LATERALITY, UNSPECIFIED PART OF LUNG: Primary | ICD-10-CM

## 2021-09-28 RX ORDER — CEFUROXIME AXETIL 500 MG/1
500 TABLET ORAL 2 TIMES DAILY
Qty: 20 TABLET | Refills: 0 | Status: SHIPPED | OUTPATIENT
Start: 2021-09-28 | End: 2021-10-08

## 2021-09-28 NOTE — TELEPHONE ENCOUNTER
Relayed results and meds sent to pharmacy. Instructed to repeat chest xray before appt on 10/08/2021. Voiced understanding. TP          ----- Message from Mo Marques MD sent at 9/28/2021 11:31 AM CDT -----  Persistent bilateral opacities in the lungs consistent with some residual pneumonia.  Ceftin 500 mg p.o. twice daily for 10 days.  Follow-up appointment in the office with me in 8 to 10 days with repeat chest x-ray 1 day prior to the appointment.  EB

## 2021-10-08 ENCOUNTER — OFFICE VISIT (OUTPATIENT)
Dept: FAMILY MEDICINE CLINIC | Facility: CLINIC | Age: 61
End: 2021-10-08

## 2021-10-08 VITALS
BODY MASS INDEX: 28.76 KG/M2 | WEIGHT: 205.4 LBS | HEIGHT: 71 IN | DIASTOLIC BLOOD PRESSURE: 80 MMHG | SYSTOLIC BLOOD PRESSURE: 110 MMHG | HEART RATE: 70 BPM | OXYGEN SATURATION: 98 % | TEMPERATURE: 96 F

## 2021-10-08 DIAGNOSIS — M15.9 PRIMARY OSTEOARTHRITIS INVOLVING MULTIPLE JOINTS: Chronic | ICD-10-CM

## 2021-10-08 DIAGNOSIS — R29.890 LOSS OF HEIGHT: ICD-10-CM

## 2021-10-08 DIAGNOSIS — M17.0 PRIMARY OSTEOARTHRITIS OF BOTH KNEES: Chronic | ICD-10-CM

## 2021-10-08 DIAGNOSIS — J18.9 PNEUMONIA OF BOTH LOWER LOBES DUE TO INFECTIOUS ORGANISM: Primary | ICD-10-CM

## 2021-10-08 DIAGNOSIS — J12.82 PNEUMONIA DUE TO COVID-19 VIRUS: ICD-10-CM

## 2021-10-08 DIAGNOSIS — U07.1 PNEUMONIA DUE TO COVID-19 VIRUS: ICD-10-CM

## 2021-10-08 PROCEDURE — 99214 OFFICE O/P EST MOD 30 MIN: CPT | Performed by: INTERNAL MEDICINE

## 2021-10-08 RX ORDER — CEFUROXIME AXETIL 500 MG/1
500 TABLET ORAL
Qty: 20 TABLET | Refills: 0 | Status: SHIPPED | OUTPATIENT
Start: 2021-10-08 | End: 2021-10-18

## 2021-10-08 RX ORDER — ZINC SULFATE 50(220)MG
220 CAPSULE ORAL DAILY
COMMUNITY
End: 2023-01-12

## 2021-10-08 RX ORDER — HYDROXYCHLOROQUINE SULFATE 200 MG/1
TABLET, FILM COATED ORAL
COMMUNITY
Start: 2021-09-29 | End: 2021-10-20

## 2021-10-08 RX ORDER — ALBUTEROL SULFATE 90 UG/1
2 AEROSOL, METERED RESPIRATORY (INHALATION)
COMMUNITY
Start: 2021-09-08 | End: 2022-09-09

## 2021-10-08 RX ORDER — L.ACID,FERM,PLA,RHA/B.BIF,LONG 126 MG
TABLET, DELAYED AND EXTENDED RELEASE ORAL
COMMUNITY
End: 2022-03-15

## 2021-10-08 NOTE — PROGRESS NOTES
"Chief Complaint  Follow-up (pneumonia and completed antibiotic yesterday . When he takes a deep breath has pain left lower back )    Subjective          History of Present Illness     Ayan Shipman presents to the clinic to follow up on pneumonia. Patient was diagnosed with COVID and pneumonia through Urgent care 08/28.  Patient was unvaccinated for COVID.  He completed Regeneron infusion.  He experienced three nights of nightsweats and a cough productive of blood streaked sputum, for which he was seen again at Urgent care 09/08/21.  He was prescribed Augmentin and Z-pack.  Chest x-ray 09/28/2021 revealed persistent bilateral opacities in the lungs consistent with some residual pneumonia, for which I prescribed 10-day course of Ceftin 500 mg b.i.d., which he completed yesterday.  Repeat chest x-ray yesterday 10/07/2021  reveals improving though not yet resolved bilateral airspace disease when compared to x-ray 9/28/2021.  He feels 90% improvement with only a sharp twinge at times.  Some pleuritic discomfort near the left lung base.  His cough resolved about a week ago.      Patient struggles with osteoarthritis pain in multiple joints including elbows and knees and is asking about steroid injections.  He noticed significant when he was on steroids to treat COVID.   He has been taking OTC Osteo Bi-Flex.  I explained chronic steroid use is not recommended and associated with thinning bones among other issues.  Of note, he is reporting decrease in height, for which he is requesting bone density screening.   He reports family history of osteoporosis in his mother.  He is scheduled for DEXA on the 20th of this month.         Objective   Vital Signs:   /80   Pulse 70   Temp 96 °F (35.6 °C) (Tympanic)   Ht 180.3 cm (71\")   Wt 93.2 kg (205 lb 6.4 oz)   SpO2 98%   BMI 28.65 kg/m²     Physical Exam  Constitutional:       General: He is not in acute distress.     Appearance: He is well-developed.      " Comments: Pleasant male.    HENT:      Head: Normocephalic and atraumatic.      Nose: Nose normal.      Mouth/Throat:      Pharynx: No oropharyngeal exudate.   Eyes:      Pupils: Pupils are equal, round, and reactive to light.   Neck:      Thyroid: No thyromegaly.      Vascular: No JVD.   Cardiovascular:      Rate and Rhythm: Normal rate and regular rhythm.      Heart sounds: Normal heart sounds.   Pulmonary:      Effort: Pulmonary effort is normal. No accessory muscle usage or respiratory distress.      Breath sounds: Normal breath sounds. No wheezing or rales.      Comments: Diminished excursion on expiratory phase of cough.   Abdominal:      General: Bowel sounds are normal. There is no distension.      Palpations: Abdomen is soft.      Tenderness: There is no abdominal tenderness.   Musculoskeletal:      Cervical back: Neck supple.   Lymphadenopathy:      Cervical: No cervical adenopathy.   Neurological:      Mental Status: He is alert and oriented to person, place, and time.      Cranial Nerves: No cranial nerve deficit.   Psychiatric:         Speech: Speech normal.         Behavior: Behavior normal.         Thought Content: Thought content normal.         Judgment: Judgment normal.              Result Review :     Common labs    Common Labsle 3/2/21 3/2/21 3/2/21 3/2/21    0723 0723 0723 0723   Glucose  117 (A)     BUN  13     Creatinine  0.93     eGFR Non African Am  82     Sodium  140     Potassium  4.1     Chloride  106     Calcium  9.1     Albumin  4.40     Total Bilirubin  0.8     Alkaline Phosphatase  36 (A)     AST (SGOT)  31     ALT (SGPT)  30     WBC 6.23      Hemoglobin 15.5      Hematocrit 44.6      Platelets 225      Total Cholesterol   196    Triglycerides   110    HDL Cholesterol   43    LDL Cholesterol    133 (A)    PSA    0.916   (A) Abnormal value            Data reviewed: Radiologic studies Chest x-ray 09/08, 09/28, and 10/07/2021 and Recent hospitalization notes [facility]         Future Appointments   Date Time Provider Department Center   10/20/2021  8:00 AM [facility] DEXA 1 [facility] D[facility]A [facility] [place]   3/15/2022  3:30 PM Mo Marques MD [facility]        Assessment and Plan    Diagnoses and all orders for this visit:    1. Pneumonia of both lower lobes due to infectious organism (Primary)    2. Pneumonia due to COVID-19 virus    3. Loss of height  -     DEXA Bone Density Axial; Future    4. Primary osteoarthritis of both knees    5. Primary osteoarthritis involving multiple joints    Other orders  -     cefuroxime (CEFTIN) 500 MG tablet; Take 1 tablet by mouth 2 (Two) Times a Day for 10 days.  Dispense: 20 tablet; Refill: 0      I spent 35 minutes caring for Ayan on this date of service. This time includes time spent by me in the following activities:preparing for the visit, reviewing tests, obtaining and/or reviewing a separately obtained history, performing a medically appropriate examination and/or evaluation , counseling and educating the patient/family/caregiver, ordering medications, tests, or procedures, documenting information in the medical record and independently interpreting results and communicating that information with the patient/family/caregiver       Patient reports approximately 90% improvement other than occasional pleuritic discomfort.  I sent a prescription to extend the Ceftin 500 mg one b.i.d. for an additional 10 days.  We won't plan to repeat chest x-ray if the symptoms resolve fully.  Notify us if symptoms start to recur once antibiotics are completed.  I recommended patient get a COVID vaccine 90 days after positive COVID dated 08/28/2021.       He is scheduled for DEXA on the 20th of this month.  He is assured minimal loss of height is not normally concerning for a male in his sixties, but we want to investigate this with his family history of osteoporosis in his mother.       Recommended OTC Aleve one b.i.d. with food as needed for the osteoarthritis pain is  not controlled with Tylenol and Osteo Bi Flex.  He reports that his brother receives episodic steroid shots, and we explained why that is not a great idea, particularly with family history of osteoporosis and his personal history of IFG.    Scribed for Dr. Marques by Bessie Marks.     Follow Up   Return if symptoms worsen or fail to improve, for Next scheduled follow up.  Patient was given instructions and counseling regarding his condition or for health maintenance advice. Please see specific information pulled into the AVS if appropriate.

## 2021-10-19 DIAGNOSIS — J18.9 PNEUMONIA OF BOTH LOWER LOBES DUE TO INFECTIOUS ORGANISM: Primary | ICD-10-CM

## 2021-10-20 ENCOUNTER — OFFICE VISIT (OUTPATIENT)
Dept: FAMILY MEDICINE CLINIC | Facility: CLINIC | Age: 61
End: 2021-10-20

## 2021-10-20 VITALS
OXYGEN SATURATION: 96 % | DIASTOLIC BLOOD PRESSURE: 84 MMHG | SYSTOLIC BLOOD PRESSURE: 128 MMHG | BODY MASS INDEX: 28.81 KG/M2 | TEMPERATURE: 96 F | HEIGHT: 71 IN | WEIGHT: 205.8 LBS | HEART RATE: 70 BPM

## 2021-10-20 DIAGNOSIS — D84.9 IMMUNOSUPPRESSED STATUS (HCC): ICD-10-CM

## 2021-10-20 DIAGNOSIS — J18.9 PNEUMONITIS: ICD-10-CM

## 2021-10-20 DIAGNOSIS — E66.3 OVERWEIGHT (BMI 25.0-29.9): Chronic | ICD-10-CM

## 2021-10-20 DIAGNOSIS — J12.82 PNEUMONIA DUE TO COVID-19 VIRUS: ICD-10-CM

## 2021-10-20 DIAGNOSIS — U07.1 PNEUMONIA DUE TO COVID-19 VIRUS: ICD-10-CM

## 2021-10-20 DIAGNOSIS — B02.9 HERPES ZOSTER WITHOUT COMPLICATION: Primary | ICD-10-CM

## 2021-10-20 PROCEDURE — 99214 OFFICE O/P EST MOD 30 MIN: CPT | Performed by: INTERNAL MEDICINE

## 2021-10-20 RX ORDER — TRIAMCINOLONE ACETONIDE 1 MG/G
1 CREAM TOPICAL 3 TIMES DAILY
Qty: 80 G | Refills: 0 | Status: SHIPPED | OUTPATIENT
Start: 2021-10-20 | End: 2023-01-12

## 2021-10-20 RX ORDER — VALACYCLOVIR HYDROCHLORIDE 1 G/1
1000 TABLET, FILM COATED ORAL 3 TIMES DAILY
Qty: 30 TABLET | Refills: 0 | Status: SHIPPED | OUTPATIENT
Start: 2021-10-20 | End: 2021-10-30

## 2021-10-20 NOTE — PROGRESS NOTES
"Chief Complaint  Follow-up (pneumonia recheck , dull pain when he breathes anytime. Has an area on left side of back that hurts. Denies SOB . He is out of the Albuterol inhaler )    Subjective          History of Present Illness     Ayan Shipman presents to the clinic to follow up on on bilateral COVID pneumonia. Patient was diagnosed with COVID and pneumonia through Urgent care 08/28.  Patient was unvaccinated for COVID.  He completed Regeneron infusion.  He has completed 30-day course of antibiotic therapy over past 5 weeks.  He continues to have some lung near the left lung base.  He is out of his albuterol rescue inhaler, which he did not feel significantly helped.   Denies dyspnea on exertion.  Denies cough.    Chest x-ray earlier this morning revealed continued clearing of bilateral basilar infiltrative changes, pneumonitis since prior exam with only minimal residual infiltrative changes remaining.  A doctor in [place] prescribed hydroxychloroquine during COVID, which he is no longer taking.      On exam, patient has mild red vesicles in region of left scapula coinciding with the area he continues to have the discomfort, which could indicate a mild course of Herpes Zoster.  In further discussion, he also recalls noticing pruritis in the area a week ago. He had Zostavax several years ago.  He has not had Shingrix.  Some provider in [facility] prescribe him hydroxychloroquine, which he has just recently completed.  The combination of recent COVID infection and hydroxyhchloroquine more than likely suppressed his immune system enough to precipitate this mild shingles outbreak.     He has maintained the recent weight loss experienced with COVID.  I encouraged him to continue the dietary efforts and try to get his weight under 200 pounds.        Objective   Vital Signs:   /84   Pulse 70   Temp 96 °F (35.6 °C) (Tympanic)   Ht 180.3 cm (71\")   Wt 93.4 kg (205 lb 12.8 oz)   SpO2 96%   BMI " 28.70 kg/m²       Physical Exam  Constitutional:       General: He is not in acute distress.     Appearance: He is well-developed.   HENT:      Head: Normocephalic and atraumatic.      Nose: Nose normal.      Mouth/Throat:      Pharynx: No oropharyngeal exudate.   Eyes:      Pupils: Pupils are equal, round, and reactive to light.   Neck:      Thyroid: No thyromegaly.      Vascular: No JVD.   Cardiovascular:      Rate and Rhythm: Normal rate and regular rhythm.      Heart sounds: Normal heart sounds.   Pulmonary:      Effort: Pulmonary effort is normal. No accessory muscle usage or respiratory distress.      Breath sounds: Normal breath sounds. No wheezing or rales.      Comments: No wheezes or pleural rub.  Abdominal:      General: Bowel sounds are normal. There is no distension.      Palpations: Abdomen is soft.      Tenderness: There is no abdominal tenderness.   Musculoskeletal:      Cervical back: Neck supple.   Lymphadenopathy:      Cervical: No cervical adenopathy.   Skin:     Comments: Mild red vesicles in region of left scapula.        Neurological:      Mental Status: He is alert and oriented to person, place, and time.      Cranial Nerves: No cranial nerve deficit.   Psychiatric:         Speech: Speech normal.         Behavior: Behavior normal.         Thought Content: Thought content normal.         Judgment: Judgment normal.        Future Appointments   Date Time Provider Department Center   3/15/2022  3:30 PM Mo Marques MD [facility]     Result Review :     Common labs    Common Labsle 3/2/21 3/2/21 3/2/21 3/2/21    0723 0723 0723 0723   Glucose  117 (A)     BUN  13     Creatinine  0.93     eGFR Non African Am  82     Sodium  140     Potassium  4.1     Chloride  106     Calcium  9.1     Albumin  4.40     Total Bilirubin  0.8     Alkaline Phosphatase  36 (A)     AST (SGOT)  31     ALT (SGPT)  30     WBC 6.23      Hemoglobin 15.5      Hematocrit 44.6      Platelets 225      Total Cholesterol   196     Triglycerides   110    HDL Cholesterol   43    LDL Cholesterol    133 (A)    PSA    0.916   (A) Abnormal value            Data reviewed: Radiologic studies Chest x-ray 10/20/2021     Mild red vesicles in region of left scapula.        Assessment and Plan    Diagnoses and all orders for this visit:    1. Herpes zoster without complication (Primary)    2. Pneumonia due to COVID-19 virus    3. Overweight (BMI 25.0-29.9)    4. Immunosuppressed status (HCC)    5. Pneumonitis    Other orders  -     valACYclovir (Valtrex) 1000 MG tablet; Take 1 tablet by mouth 3 (Three) Times a Day for 10 days.  Dispense: 30 tablet; Refill: 0  -     triamcinolone (KENALOG) 0.1 % cream; Apply 1 application topically to the appropriate area as directed 3 (Three) Times a Day. For itching  Dispense: 80 g; Refill: 0         I spent 30 minutes caring for Ayan on this date of service. This time includes time spent by me in the following activities:preparing for the visit, reviewing tests, obtaining and/or reviewing a separately obtained history, performing a medically appropriate examination and/or evaluation , counseling and educating the patient/family/caregiver, ordering medications, tests, or procedures, documenting information in the medical record and independently interpreting results and communicating that information with the patient/family/caregiver     Patient has developed mild red vesicles in region of left scapula. The persistent pain in the area near the left scapula appears to be a mild case of Herpes Zoster, most likely related to suppressed immunity with recent case of COVID and course of hydroxychloroquine, which was prescribed by a provider in [facility].  I sent prescriptions for Valtrex 1000 mg to take one t.i.d. x 10 days and Kenalog 0.1% to apply t.i.d as needed to prevent dryness and itching.   We will plan to give patient Shingrix vaccination in six months.         Chest x-ray reveals continued clearing of  COVID pneumonia and continued improvement of viral pneumonitis after 30-day course of antibiotics.  I recommended patient get a COVID vaccine 90 days after positive COVID dated 08/28/2021.  I would have given him a low-dose of steroids, but this might exacerbate the shingles    I encouraged him to continue the dietary efforts and try to get his weight under 200 pounds.        Return in March for routine follow up with fasting labs one week prior or sooner if needed.     Scribed for Dr. Marques by Whit Arreola [facility].       Follow Up   Return if symptoms worsen or fail to improve, for Next scheduled follow up.  Patient was given instructions and counseling regarding his condition or for health maintenance advice. Please see specific information pulled into the AVS if appropriate.

## 2022-03-08 ENCOUNTER — LAB (OUTPATIENT)
Dept: LAB | Facility: OTHER | Age: 62
End: 2022-03-08

## 2022-03-08 DIAGNOSIS — R73.01 IMPAIRED FASTING GLUCOSE: Chronic | ICD-10-CM

## 2022-03-08 DIAGNOSIS — Z12.5 SPECIAL SCREENING FOR MALIGNANT NEOPLASM OF PROSTATE: ICD-10-CM

## 2022-03-08 DIAGNOSIS — Z00.00 ROUTINE GENERAL MEDICAL EXAMINATION AT A HEALTH CARE FACILITY: ICD-10-CM

## 2022-03-08 LAB
ALBUMIN SERPL-MCNC: 4.3 G/DL (ref 3.5–5)
ALBUMIN/GLOB SERPL: 1.5 G/DL (ref 1.1–1.8)
ALP SERPL-CCNC: 42 U/L (ref 38–126)
ALT SERPL W P-5'-P-CCNC: 29 U/L
ANION GAP SERPL CALCULATED.3IONS-SCNC: 7 MMOL/L (ref 5–15)
AST SERPL-CCNC: 31 U/L (ref 17–59)
BASOPHILS # BLD AUTO: 0.03 10*3/MM3 (ref 0–0.2)
BASOPHILS NFR BLD AUTO: 0.5 % (ref 0–1.5)
BILIRUB SERPL-MCNC: 0.6 MG/DL (ref 0.2–1.3)
BUN SERPL-MCNC: 13 MG/DL (ref 7–23)
BUN/CREAT SERPL: 13.8 (ref 7–25)
CALCIUM SPEC-SCNC: 8.9 MG/DL (ref 8.4–10.2)
CHLORIDE SERPL-SCNC: 106 MMOL/L (ref 101–112)
CHOLEST SERPL-MCNC: 191 MG/DL (ref 150–200)
CO2 SERPL-SCNC: 27 MMOL/L (ref 22–30)
CREAT SERPL-MCNC: 0.94 MG/DL (ref 0.7–1.3)
DEPRECATED RDW RBC AUTO: 44 FL (ref 37–54)
EGFRCR SERPLBLD CKD-EPI 2021: 91.1 ML/MIN/1.73
EOSINOPHIL # BLD AUTO: 0.09 10*3/MM3 (ref 0–0.4)
EOSINOPHIL NFR BLD AUTO: 1.5 % (ref 0.3–6.2)
ERYTHROCYTE [DISTWIDTH] IN BLOOD BY AUTOMATED COUNT: 13 % (ref 12.3–15.4)
GLOBULIN UR ELPH-MCNC: 2.8 GM/DL (ref 2.3–3.5)
GLUCOSE SERPL-MCNC: 120 MG/DL (ref 70–99)
HBA1C MFR BLD: 5.2 % (ref 4.8–5.6)
HCT VFR BLD AUTO: 44.8 % (ref 37.5–51)
HDLC SERPL-MCNC: 49 MG/DL (ref 40–59)
HGB BLD-MCNC: 15.5 G/DL (ref 13–17.7)
LDLC SERPL CALC-MCNC: 124 MG/DL
LDLC/HDLC SERPL: 2.49 {RATIO} (ref 0–3.55)
LYMPHOCYTES # BLD AUTO: 1.7 10*3/MM3 (ref 0.7–3.1)
LYMPHOCYTES NFR BLD AUTO: 29.1 % (ref 19.6–45.3)
MCH RBC QN AUTO: 32.8 PG (ref 26.6–33)
MCHC RBC AUTO-ENTMCNC: 34.6 G/DL (ref 31.5–35.7)
MCV RBC AUTO: 94.9 FL (ref 79–97)
MONOCYTES # BLD AUTO: 0.55 10*3/MM3 (ref 0.1–0.9)
MONOCYTES NFR BLD AUTO: 9.4 % (ref 5–12)
NEUTROPHILS NFR BLD AUTO: 3.47 10*3/MM3 (ref 1.7–7)
NEUTROPHILS NFR BLD AUTO: 59.5 % (ref 42.7–76)
PLATELET # BLD AUTO: 208 10*3/MM3 (ref 140–450)
PMV BLD AUTO: 9 FL (ref 6–12)
POTASSIUM SERPL-SCNC: 4.1 MMOL/L (ref 3.4–5)
PROT SERPL-MCNC: 7.1 G/DL (ref 6.3–8.6)
PSA SERPL-MCNC: 1.19 NG/ML (ref 0–4)
RBC # BLD AUTO: 4.72 10*6/MM3 (ref 4.14–5.8)
SODIUM SERPL-SCNC: 140 MMOL/L (ref 137–145)
TRIGL SERPL-MCNC: 99 MG/DL
TSH SERPL DL<=0.05 MIU/L-ACNC: 2.78 UIU/ML (ref 0.27–4.2)
VLDLC SERPL-MCNC: 18 MG/DL (ref 5–40)
WBC NRBC COR # BLD: 5.84 10*3/MM3 (ref 3.4–10.8)

## 2022-03-08 PROCEDURE — G0103 PSA SCREENING: HCPCS | Performed by: INTERNAL MEDICINE

## 2022-03-08 PROCEDURE — 36415 COLL VENOUS BLD VENIPUNCTURE: CPT | Performed by: INTERNAL MEDICINE

## 2022-03-08 PROCEDURE — 83036 HEMOGLOBIN GLYCOSYLATED A1C: CPT | Performed by: INTERNAL MEDICINE

## 2022-03-08 PROCEDURE — 80050 GENERAL HEALTH PANEL: CPT | Performed by: INTERNAL MEDICINE

## 2022-03-08 PROCEDURE — 80061 LIPID PANEL: CPT | Performed by: INTERNAL MEDICINE

## 2022-03-15 ENCOUNTER — OFFICE VISIT (OUTPATIENT)
Dept: FAMILY MEDICINE CLINIC | Facility: CLINIC | Age: 62
End: 2022-03-15

## 2022-03-15 VITALS
WEIGHT: 205 LBS | HEART RATE: 72 BPM | TEMPERATURE: 97.4 F | SYSTOLIC BLOOD PRESSURE: 128 MMHG | HEIGHT: 71 IN | DIASTOLIC BLOOD PRESSURE: 86 MMHG | BODY MASS INDEX: 28.7 KG/M2

## 2022-03-15 DIAGNOSIS — Z00.00 ROUTINE GENERAL MEDICAL EXAMINATION AT A HEALTH CARE FACILITY: Primary | ICD-10-CM

## 2022-03-15 DIAGNOSIS — E66.3 OVERWEIGHT (BMI 25.0-29.9): Chronic | ICD-10-CM

## 2022-03-15 DIAGNOSIS — R73.01 IMPAIRED FASTING GLUCOSE: Chronic | ICD-10-CM

## 2022-03-15 DIAGNOSIS — Z12.5 SPECIAL SCREENING FOR MALIGNANT NEOPLASM OF PROSTATE: ICD-10-CM

## 2022-03-15 PROBLEM — E66.09 CLASS 1 OBESITY DUE TO EXCESS CALORIES WITH SERIOUS COMORBIDITY AND BODY MASS INDEX (BMI) OF 30.0 TO 30.9 IN ADULT: Chronic | Status: RESOLVED | Noted: 2021-03-08 | Resolved: 2022-03-15

## 2022-03-15 PROCEDURE — 99396 PREV VISIT EST AGE 40-64: CPT | Performed by: INTERNAL MEDICINE
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
## 2022-08-09 ENCOUNTER — OFFICE VISIT (OUTPATIENT)
Dept: FAMILY MEDICINE CLINIC | Facility: CLINIC | Age: 62
End: 2022-08-09

## 2022-08-09 VITALS
BODY MASS INDEX: 28.34 KG/M2 | SYSTOLIC BLOOD PRESSURE: 130 MMHG | HEART RATE: 64 BPM | HEIGHT: 71 IN | TEMPERATURE: 97.5 F | DIASTOLIC BLOOD PRESSURE: 88 MMHG | WEIGHT: 202.4 LBS

## 2022-08-09 DIAGNOSIS — I10 DIASTOLIC HYPERTENSION: ICD-10-CM

## 2022-08-09 DIAGNOSIS — M75.21 BICEPS TENDINITIS OF RIGHT SHOULDER: ICD-10-CM

## 2022-08-09 DIAGNOSIS — R73.01 IMPAIRED FASTING GLUCOSE: Chronic | ICD-10-CM

## 2022-08-09 DIAGNOSIS — M15.9 PRIMARY OSTEOARTHRITIS INVOLVING MULTIPLE JOINTS: Chronic | ICD-10-CM

## 2022-08-09 DIAGNOSIS — M75.101 ROTATOR CUFF SYNDROME OF RIGHT SHOULDER: Primary | ICD-10-CM

## 2022-08-09 DIAGNOSIS — Z87.19 HISTORY OF GASTROESOPHAGEAL REFLUX (GERD): ICD-10-CM

## 2022-08-09 DIAGNOSIS — E66.3 OVERWEIGHT (BMI 25.0-29.9): Chronic | ICD-10-CM

## 2022-08-09 PROCEDURE — 20610 DRAIN/INJ JOINT/BURSA W/O US: CPT | Performed by: INTERNAL MEDICINE

## 2022-08-09 PROCEDURE — 99214 OFFICE O/P EST MOD 30 MIN: CPT | Performed by: INTERNAL MEDICINE

## 2022-08-09 RX ORDER — IBUPROFEN 200 MG
200 TABLET ORAL EVERY 6 HOURS PRN
COMMUNITY
End: 2022-08-09 | Stop reason: ALTCHOICE

## 2022-08-09 RX ORDER — ACETAMINOPHEN 500 MG
500 TABLET ORAL EVERY 6 HOURS PRN
COMMUNITY

## 2022-08-09 RX ORDER — CELECOXIB 200 MG/1
200 CAPSULE ORAL DAILY
Qty: 30 CAPSULE | Refills: 0 | Status: SHIPPED | OUTPATIENT
Start: 2022-08-09 | End: 2022-10-14 | Stop reason: SDUPTHER

## 2022-08-09 RX ORDER — TRIAMCINOLONE ACETONIDE 40 MG/ML
20 INJECTION, SUSPENSION INTRA-ARTICULAR; INTRAMUSCULAR ONCE
Status: COMPLETED | OUTPATIENT
Start: 2022-08-09 | End: 2022-08-10

## 2022-08-09 RX ORDER — METHYLPREDNISOLONE ACETATE 40 MG/ML
40 INJECTION, SUSPENSION INTRA-ARTICULAR; INTRALESIONAL; INTRAMUSCULAR; SOFT TISSUE ONCE
Status: COMPLETED | OUTPATIENT
Start: 2022-08-09 | End: 2022-08-10

## 2022-08-09 NOTE — PROGRESS NOTES
Chief Complaint  Shoulder Pain (Right x 4-5 weeks. He has been taking Tylenol and Ibuprofen ) and Arm Pain (Right biceps pain)    Subjective        History of Present Illness     Ayan Shipman presents to the office today reporting acute onset of right shoulder pain approximately 5 weeks ago with decreased ROM.  He is describing pain in the right shoulder.  Right biceps tendon. Overhead ROM is worse at some times more than others.  He is having difficulty finding a comfortable position in the bed at night due to the pain.  He is describing rotator cuff syndrome with episodic impingement.  He is also describing some proximal right biceps tendinitis.  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 He has been taking 2 Tylenol and 2 Ibuprofen together once daily with partial temporary improvement of symptoms.  Patient had normal renal function with labs in March, but labs have revealed IFG.  He is asking about a steroid injection in the shoulder.  We discussed the temporary impact that the steroid injection would likely have on his IFG.  He has a brother who has had several steroid joint injections with good results.  Patient is not on anticoagulation therapy.  Denies current GERD symptoms, but reports a history of GERD symptoms.  Patient had MRI of the left shoulder in 2016 revealing hypertrophic changes in left AC joint.  The left shoulder has not really been bothering him, although, has flared a little recently with him overcompensating with the right shoulder pain.       He has a strong family history of osteoarthritis in his mother.  He has a history of left meniscus tear and struggles with chronic bilateral knee pain as well as pain in  "the hands and at the base of the thumbs.    Diastolic blood pressure is marginal today at 88.  Weight is down 3 pounds in the past 5 months, but BMI is still above goal at 28      Objective   Vital Signs:  /88   Pulse 64   Temp 97.5 °F (36.4 °C) (Tympanic)   Ht 180.3 cm (71\")   Wt 91.8 kg (202 lb 6.4 oz)   BMI 28.23 kg/m²   Estimated body mass index is 28.23 kg/m² as calculated from the following:    Height as of this encounter: 180.3 cm (71\").    Weight as of this encounter: 91.8 kg (202 lb 6.4 oz).          Physical Exam  Constitutional:       General: He is not in acute distress.     Appearance: He is well-developed.      Comments: Pleasant male, overweight.    HENT:      Head: Normocephalic and atraumatic.      Nose: Nose normal.      Mouth/Throat:      Pharynx: No oropharyngeal exudate.   Eyes:      Pupils: Pupils are equal, round, and reactive to light.   Neck:      Thyroid: No thyromegaly.      Vascular: No JVD.   Cardiovascular:      Rate and Rhythm: Normal rate and regular rhythm.      Heart sounds: Normal heart sounds.   Pulmonary:      Effort: Pulmonary effort is normal. No accessory muscle usage or respiratory distress.      Breath sounds: Normal breath sounds. No wheezing or rales.   Abdominal:      General: There is no distension.      Palpations: Abdomen is soft.   Musculoskeletal:      Cervical back: Neck supple.      Comments: Exam of right shoulder reveals decreased range of motion with crepitation   Positive drop test.  Positive impingement sign.  Tenderness of the proximal biceps tendon consistent with tendinitis, but no evidence of biceps tendon rupture   Lymphadenopathy:      Cervical: No cervical adenopathy.   Neurological:      Mental Status: He is alert and oriented to person, place, and time.      Cranial Nerves: No cranial nerve deficit.   Psychiatric:         Speech: Speech normal.         Behavior: Behavior normal.         Thought Content: Thought content normal.         " Judgment: Judgment normal.              Result Review :    Renal Profile    Renal Profile 3/8/22   BUN 13   Creatinine 0.94         Hemoglobin A1c 5.2  Data reviewed: Radiologic studies MRI left shoulder 10/2016          Assessment and Plan   Diagnoses and all orders for this visit:    1. Rotator cuff syndrome of right shoulder (Primary)  -     XR Shoulder 2+ View Right  -     triamcinolone acetonide (KENALOG-40) injection 20 mg  -     methylPREDNISolone acetate (DEPO-medrol) injection 40 mg    2. Biceps tendinitis of right shoulder    3. Primary osteoarthritis involving multiple joints  -     XR Shoulder 2+ View Right  -     triamcinolone acetonide (KENALOG-40) injection 20 mg  -     methylPREDNISolone acetate (DEPO-medrol) injection 40 mg    4. Impaired fasting glucose    5. History of gastroesophageal reflux (GERD)    6. Diastolic hypertension    7. Overweight (BMI 25.0-29.9)    Other orders  -     celecoxib (CeleBREX) 200 MG capsule; Take 1 capsule by mouth Daily. With food  Dispense: 30 capsule; Refill: 0        Future Appointments   Date Time Provider Department Center   3/22/2023  3:30 PM Mo Marques MD [facility]        Arthrocentesis    Date/Time: 8/9/2022 4:25 PM  Performed by: Mo Marques MD  Authorized by: Mo Marques MD   Indications: pain   Body area: shoulder  Joint: right shoulder  Local anesthesia used: yes    Anesthesia:  Local anesthesia used: yes  Local Anesthetic: lidocaine 1% with epinephrine  Anesthetic total: 1 mL    Sedation:  Patient sedated: no    Preparation: Patient was prepped and draped in the usual sterile fashion.  Needle size: 22 G  Ultrasound guidance: no  Approach: medial  Patient tolerance: patient tolerated the procedure well with no immediate complications  Comments: After obtaining verbal informed consent, the right shoulder is cleaned and prepped in normal sterile fashion.  Local anesthesia using lidocaine 1% with epinephrine.  The right shoulder  is then  injected medially with 20 mg of Kenalog, 40 mg of Depo-Medrol, and 1/2 mL each of lidocaine and Marcaine without difficulty or complication.  The patient tolerated the procedure well.  Total procedure time: 11 minutes           We will proceed with conservative therapy for the right rotator cuff syndrome and biceps tendinitis.  Orders placed for patient to stop by for x-ray of the right shoulder on his way out.  We will notify with results.  A prescription is sent for Celebrex 200 mg to take one q.d. with food for approximately 2 weeks, but up to 30 days if needed.   Avoid taking Ibuprofen or other NSAIDs while using the Celebrex.  If Celebrex causes any recurrence of GERD symptoms, take OTC Prilosec 20 mg daily while on the Celebrex.  I reviewed ROM exercises.  Notify us if no significant improvement with conservative measures in 2-3 weeks and we will consider MRI of the right shoulder.    We discussed how the Celebrex will likely help other arthritic aches and pains.  He has been coping relatively well with OTC Tylenol or OTC NSAIDs episodically in the past thus far.  Multiple joints hurt.  See above.    I explained her that the steroid injection into the joint will likely temporarily raise glucose in this patient with IFG.  He is encouraged to follow a fairly strict low carbohydrate diet for the next week.    Diastolic blood pressure is above goal today.  Pursue sodium restriction and weight loss in this overweight patient.  We will continue to monitor.  No medication yet.    Return 03/22/2022 for routine follow up with fasting labs one week prior or sooner if needed or if right shoulder pain/rotator cuff syndrome doesn't improve.     Scribed for Dr. Marques by Whit Arreola [facility].     Follow Up   Return if symptoms worsen or fail to improve, for Next scheduled follow up.  Patient was given instructions and counseling regarding his condition or for health maintenance advice. Please see specific information  pulled into the AVS if appropriate.

## 2022-08-09 NOTE — PATIENT INSTRUCTIONS
Calorie Counting for Weight Loss  Calories are units of energy. Your body needs a certain number of calories from food to keep going throughout the day. When you eat or drink more calories than your body needs, your body stores the extra calories mostly as fat. When you eat or drink fewer calories than your body needs, your body burns fat to get the energy it needs.  Calorie counting means keeping track of how many calories you eat and drink each day. Calorie counting can be helpful if you need to lose weight. If you eat fewer calories than your body needs, you should lose weight. Ask your health care provider what a healthy weight is for you.  For calorie counting to work, you will need to eat the right number of calories each day to lose a healthy amount of weight per week. A dietitian can help you figure out how many calories you need in a day and will suggest ways to reach your calorie goal.  A healthy amount of weight to lose each week is usually 1-2 lb (0.5-0.9 kg). This usually means that your daily calorie intake should be reduced by 500-750 calories.  Eating 1,200-1,500 calories a day can help most women lose weight.  Eating 1,500-1,800 calories a day can help most men lose weight.  What do I need to know about calorie counting?  Work with your health care provider or dietitian to determine how many calories you should get each day. To meet your daily calorie goal, you will need to:  Find out how many calories are in each food that you would like to eat. Try to do this before you eat.  Decide how much of the food you plan to eat.  Keep a food log. Do this by writing down what you ate and how many calories it had.  To successfully lose weight, it is important to balance calorie counting with a healthy lifestyle that includes regular activity.  Where do I find calorie information?    The number of calories in a food can be found on a Nutrition Facts label. If a food does not have a Nutrition Facts label, try  to look up the calories online or ask your dietitian for help.  Remember that calories are listed per serving. If you choose to have more than one serving of a food, you will have to multiply the calories per serving by the number of servings you plan to eat. For example, the label on a package of bread might say that a serving size is 1 slice and that there are 90 calories in a serving. If you eat 1 slice, you will have eaten 90 calories. If you eat 2 slices, you will have eaten 180 calories.  How do I keep a food log?  After each time that you eat, record the following in your food log as soon as possible:  What you ate. Be sure to include toppings, sauces, and other extras on the food.  How much you ate. This can be measured in cups, ounces, or number of items.  How many calories were in each food and drink.  The total number of calories in the food you ate.  Keep your food log near you, such as in a pocket-sized notebook or on an hilario or website on your mobile phone. Some programs will calculate calories for you and show you how many calories you have left to meet your daily goal.  What are some portion-control tips?  Know how many calories are in a serving. This will help you know how many servings you can have of a certain food.  Use a measuring cup to measure serving sizes. You could also try weighing out portions on a kitchen scale. With time, you will be able to estimate serving sizes for some foods.  Take time to put servings of different foods on your favorite plates or in your favorite bowls and cups so you know what a serving looks like.  Try not to eat straight from a food's packaging, such as from a bag or box. Eating straight from the package makes it hard to see how much you are eating and can lead to overeating. Put the amount you would like to eat in a cup or on a plate to make sure you are eating the right portion.  Use smaller plates, glasses, and bowls for smaller portions and to prevent  overeating.  Try not to multitask. For example, avoid watching TV or using your computer while eating. If it is time to eat, sit down at a table and enjoy your food. This will help you recognize when you are full. It will also help you be more mindful of what and how much you are eating.  What are tips for following this plan?  Reading food labels  Check the calorie count compared with the serving size. The serving size may be smaller than what you are used to eating.  Check the source of the calories. Try to choose foods that are high in protein, fiber, and vitamins, and low in saturated fat, trans fat, and sodium.  Shopping  Read nutrition labels while you shop. This will help you make healthy decisions about which foods to buy.  Pay attention to nutrition labels for low-fat or fat-free foods. These foods sometimes have the same number of calories or more calories than the full-fat versions. They also often have added sugar, starch, or salt to make up for flavor that was removed with the fat.  Make a grocery list of lower-calorie foods and stick to it.  Cooking  Try to cook your favorite foods in a healthier way. For example, try baking instead of frying.  Use low-fat dairy products.  Meal planning  Use more fruits and vegetables. One-half of your plate should be fruits and vegetables.  Include lean proteins, such as chicken, turkey, and fish.  Lifestyle  Each week, aim to do one of the followin minutes of moderate exercise, such as walking.  75 minutes of vigorous exercise, such as running.  General information  Know how many calories are in the foods you eat most often. This will help you calculate calorie counts faster.  Find a way of tracking calories that works for you. Get creative. Try different apps or programs if writing down calories does not work for you.  What foods should I eat?    Eat nutritious foods. It is better to have a nutritious, high-calorie food, such as an avocado, than a food with  few nutrients, such as a bag of potato chips.  Use your calories on foods and drinks that will fill you up and will not leave you hungry soon after eating.  Examples of foods that fill you up are nuts and nut butters, vegetables, lean proteins, and high-fiber foods such as whole grains. High-fiber foods are foods with more than 5 g of fiber per serving.  Pay attention to calories in drinks. Low-calorie drinks include water and unsweetened drinks.  The items listed above may not be a complete list of foods and beverages you can eat. Contact a dietitian for more information.  What foods should I limit?  Limit foods or drinks that are not good sources of vitamins, minerals, or protein or that are high in unhealthy fats. These include:  Candy.  Other sweets.  Sodas, specialty coffee drinks, alcohol, and juice.  The items listed above may not be a complete list of foods and beverages you should avoid. Contact a dietitian for more information.  How do I count calories when eating out?  Pay attention to portions. Often, portions are much larger when eating out. Try these tips to keep portions smaller:  Consider sharing a meal instead of getting your own.  If you get your own meal, eat only half of it. Before you start eating, ask for a container and put half of your meal into it.  When available, consider ordering smaller portions from the menu instead of full portions.  Pay attention to your food and drink choices. Knowing the way food is cooked and what is included with the meal can help you eat fewer calories.  If calories are listed on the menu, choose the lower-calorie options.  Choose dishes that include vegetables, fruits, whole grains, low-fat dairy products, and lean proteins.  Choose items that are boiled, broiled, grilled, or steamed. Avoid items that are buttered, battered, fried, or served with cream sauce. Items labeled as crispy are usually fried, unless stated otherwise.  Choose water, low-fat milk,  unsweetened iced tea, or other drinks without added sugar. If you want an alcoholic beverage, choose a lower-calorie option, such as a glass of wine or light beer.  Ask for dressings, sauces, and syrups on the side. These are usually high in calories, so you should limit the amount you eat.  If you want a salad, choose a garden salad and ask for grilled meats. Avoid extra toppings such as sandhu, cheese, or fried items. Ask for the dressing on the side, or ask for olive oil and vinegar or lemon to use as dressing.  Estimate how many servings of a food you are given. Knowing serving sizes will help you be aware of how much food you are eating at restaurants.  Where to find more information  Centers for Disease Control and Prevention: www.cdc.gov  [place] Department of Agriculture: myplate.gov  Summary  Calorie counting means keeping track of how many calories you eat and drink each day. If you eat fewer calories than your body needs, you should lose weight.  A healthy amount of weight to lose per week is usually 1-2 lb (0.5-0.9 kg). This usually means reducing your daily calorie intake by 500-750 calories.  The number of calories in a food can be found on a Nutrition Facts label. If a food does not have a Nutrition Facts label, try to look up the calories online or ask your dietitian for help.  Use smaller plates, glasses, and bowls for smaller portions and to prevent overeating.  Use your calories on foods and drinks that will fill you up and not leave you hungry shortly after a meal.  This information is not intended to replace advice given to you by your health care provider. Make sure you discuss any questions you have with your health care provider.  Document Revised: 01/28/2021 Document Reviewed: 01/28/2021  Elsevier Patient Education © 2021 [employer]

## 2022-08-10 RX ADMIN — METHYLPREDNISOLONE ACETATE 40 MG: 40 INJECTION, SUSPENSION INTRA-ARTICULAR; INTRALESIONAL; INTRAMUSCULAR; SOFT TISSUE at 08:45

## 2022-08-10 RX ADMIN — TRIAMCINOLONE ACETONIDE 20 MG: 40 INJECTION, SUSPENSION INTRA-ARTICULAR; INTRAMUSCULAR at 08:45

## 2022-10-14 ENCOUNTER — OFFICE VISIT (OUTPATIENT)
Dept: FAMILY MEDICINE CLINIC | Facility: CLINIC | Age: 62
End: 2022-10-14

## 2022-10-14 VITALS
BODY MASS INDEX: 28 KG/M2 | HEART RATE: 80 BPM | TEMPERATURE: 98.2 F | WEIGHT: 200 LBS | DIASTOLIC BLOOD PRESSURE: 80 MMHG | SYSTOLIC BLOOD PRESSURE: 138 MMHG | HEIGHT: 71 IN

## 2022-10-14 DIAGNOSIS — M17.0 PRIMARY OSTEOARTHRITIS OF BOTH KNEES: Chronic | ICD-10-CM

## 2022-10-14 DIAGNOSIS — M75.101 ROTATOR CUFF SYNDROME OF RIGHT SHOULDER: Primary | Chronic | ICD-10-CM

## 2022-10-14 DIAGNOSIS — M19.011 PRIMARY OSTEOARTHRITIS OF RIGHT SHOULDER: Chronic | ICD-10-CM

## 2022-10-14 PROCEDURE — 99214 OFFICE O/P EST MOD 30 MIN: CPT | Performed by: INTERNAL MEDICINE

## 2022-10-14 RX ORDER — CELECOXIB 200 MG/1
200 CAPSULE ORAL DAILY
Qty: 30 CAPSULE | Refills: 2 | Status: SHIPPED | OUTPATIENT
Start: 2022-10-14 | End: 2022-12-15

## 2022-10-14 NOTE — PROGRESS NOTES
"Chief Complaint  Shoulder Pain (Right and not improving. Left one starting to hurt also. He ran out of Celebrex so he has been taking Tylenol and Ibuprofen )    Subjective        History of Present Illness     Ayan Shipman presents to the office reporting persistent/worsening right shoulder pain, which has not responded to conservative therapy and is now also reporting left shoulder pain.  We saw him for right shoulder pain 08/09/2022 and initiated conservative treatment with Celebrex and gave intra-articular injection in right shoulder.  X-rays of the right shoulder 08/2022 revealed rather extensive arthritic and degenerative changes of the right shoulder joint.  Pain is making sleep difficult even when lying on his back. Since he failed conservative therapy, we will refer for MRI and refer to Dr. Mccabe, orthopedist.  Dr. Mccabe repaired meniscus tear in the past.   Ibuprofen and Tylenol gave very little pain relief, although, did get partial relief with Celebrex, although, has ran out.          Objective   Vital Signs:  /80   Pulse 80   Temp 98.2 °F (36.8 °C)   Ht 180.3 cm (71\")   Wt 90.7 kg (200 lb)   BMI 27.89 kg/m²   Estimated body mass index is 27.89 kg/m² as calculated from the following:    Height as of this encounter: 180.3 cm (71\").    Weight as of this encounter: 90.7 kg (200 lb).          Physical Exam  Constitutional:       General: He is not in acute distress.     Appearance: He is well-developed.      Comments: Pleasant male.     HENT:      Head: Normocephalic and atraumatic.      Nose: Nose normal.      Mouth/Throat:      Pharynx: No oropharyngeal exudate.   Eyes:      Pupils: Pupils are equal, round, and reactive to light.   Neck:      Thyroid: No thyromegaly.      Vascular: No JVD.   Cardiovascular:      Rate and Rhythm: Normal rate and regular rhythm.      Heart sounds: Normal heart sounds.   Pulmonary:      Effort: Pulmonary effort is normal. No accessory muscle usage or " respiratory distress.      Breath sounds: Normal breath sounds. No wheezing or rales.   Abdominal:      General: Bowel sounds are normal. There is no distension.      Palpations: Abdomen is soft.      Tenderness: There is no abdominal tenderness.   Musculoskeletal:      Cervical back: Neck supple.      Comments: Exam of right shoulder reveals deltoid pain with overhead range of motion.    Lymphadenopathy:      Cervical: No cervical adenopathy.   Neurological:      Mental Status: He is alert and oriented to person, place, and time.      Cranial Nerves: No cranial nerve deficit.   Psychiatric:         Speech: Speech normal.         Behavior: Behavior normal.         Thought Content: Thought content normal.         Judgment: Judgment normal.            Result Review :    Renal Profile    Renal Profile 3/8/22   BUN 13   Creatinine 0.94           A1C Last 3 Results    HGBA1C Last 3 Results 3/8/22   Hemoglobin A1C 5.20           Data reviewed: Radiologic studies  X-rays of the right shoulder 08/2022       Future Appointments   Date Time Provider Department Center   3/22/2023  3:30 PM Mo Marques MD [facility]        Assessment and Plan   Diagnoses and all orders for this visit:    1. Rotator cuff syndrome of right shoulder (Primary)  -     MRI Shoulder Right Without Contrast; Future  -     Ambulatory Referral to Orthopedic Surgery    2. Primary osteoarthritis of right shoulder  -     MRI Shoulder Right Without Contrast; Future  -     Ambulatory Referral to Orthopedic Surgery    3. Primary osteoarthritis of both knees    Other orders  -     celecoxib (CeleBREX) 200 MG capsule; Take 1 capsule by mouth Daily. With food  Dispense: 30 capsule; Refill: 2                Since patient failed conservative treatment to address right shoulder pain/rotator cuff injury, we will obtain MRI and refer to Dr. Mccabe for further evaluation.   A refill is sent for Celebrex 200 mg to take one q.d. with food.  Avoid other NSAIDs with  the Celebrex, but can add Tylenol, up to 3000 mg daily.  Continue to try to avoid activities that aggravate shoulder pain.    He is also experiencing increasing left knee pain.  He has significant osteoarthritis of both knees.  Same interventions as above.  He can discuss further possible interventions with Dr. Mccabe    Return 03/2023 for routine follow up with fasting labs one week prior or sooner if needed.       Scribed for Dr. Marques by Whit Arreola [facility].     Follow Up   Return if symptoms worsen or fail to improve, for Next scheduled follow up.  Patient was given instructions and counseling regarding his condition or for health maintenance advice. Please see specific information pulled into the AVS if appropriate.

## 2022-11-07 ENCOUNTER — OFFICE VISIT (OUTPATIENT)
Dept: FAMILY MEDICINE CLINIC | Facility: CLINIC | Age: 62
End: 2022-11-07

## 2022-11-07 VITALS
SYSTOLIC BLOOD PRESSURE: 132 MMHG | TEMPERATURE: 96 F | BODY MASS INDEX: 28.42 KG/M2 | WEIGHT: 203 LBS | DIASTOLIC BLOOD PRESSURE: 80 MMHG | HEIGHT: 71 IN | HEART RATE: 60 BPM

## 2022-11-07 DIAGNOSIS — G45.3 AMAUROSIS FUGAX OF RIGHT EYE: Primary | ICD-10-CM

## 2022-11-07 DIAGNOSIS — M75.101 ROTATOR CUFF SYNDROME OF RIGHT SHOULDER: ICD-10-CM

## 2022-11-07 PROCEDURE — 99215 OFFICE O/P EST HI 40 MIN: CPT | Performed by: INTERNAL MEDICINE

## 2022-11-07 RX ORDER — ROSUVASTATIN CALCIUM 20 MG/1
20 TABLET, COATED ORAL DAILY
Qty: 30 TABLET | Refills: 6 | Status: SHIPPED | OUTPATIENT
Start: 2022-11-07 | End: 2023-03-02

## 2022-11-07 NOTE — PROGRESS NOTES
"Chief Complaint  Eye Problem (Visual disturbance/episode 4 days ago , he had vertigo and then right eye vision changed with cloudiness x 3 minutes. States this has occurred several times last few years where he had temporary lightheaded sensation when he stood up but this lasted longer. He states has recently been to [facility] and was given a good report ) and Headache (Has increased recently but not bad )    Subjective        History of Present Illness     Ayan Shipman presents to the clinic describing an episode of amaurosis fugax four days ago when he experienced acute episode of partial vision loss four days ago on Friday.  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 He could only see light and dark.  Denies unilateral numbness or weakness. He has had 2-4 prior similar episodes over past two years, although, this was the wosre and lasted for approximately 3 minutes. All episodes occurred after standing up quickly.   His wife is an RN and checked vital signs and pupils when he returned home for lunch a couple of hours after episode.  Eyes were symmetrical and BP, heart rate/rhythm was normal.  He continues to feels mild pressure in the right eye even when not touching the eye.  His family history is positive for CAD/massive stroke in his mother.  She had carotid artery disease.  Patient took daily aspirin in the past and was tried on Lipitor, which he could not tolerate due to \"feeling bad\".  His cholesterol has been at goal/reasonable in a person with no history of CAD, although, his goals may change with imaging results, if found to have atherosclerosis.   He had normal optometry exam recently.  I recommended getting him on daily 162 mg aspirin and statin therapy while we await results from bilateral carotid " "ultrasound and subsequent work-up.  We will also refer to ophthalmology.  He will likely need imaging of the head/brain.    Patient had MRI of right shoulder this morning (11/07/2022) revealing rotator cuff abnormality including full thickness tear of the biceps tendon with retraction   Insertional bursal surface tear of the supraspinatus tendon and interstitial tear of the subscapularis and infraspinatus.  We referred to Dr. Mccabe, orthopedist.  We will notify Dr. Mccabe' office MRI was completed this morning so he can be scheduled.          Objective   Vital Signs:  /80   Pulse 60   Temp 96 °F (35.6 °C)   Ht 180.3 cm (71\")   Wt 92.1 kg (203 lb)   BMI 28.31 kg/m²   Estimated body mass index is 28.31 kg/m² as calculated from the following:    Height as of this encounter: 180.3 cm (71\").    Weight as of this encounter: 92.1 kg (203 lb).          Physical Exam  Vitals reviewed.   Constitutional:       General: He is not in acute distress.     Appearance: He is well-developed.      Comments: Pleasant male, overweight.    HENT:      Head: Normocephalic and atraumatic.      Nose:      Right Sinus: No maxillary sinus tenderness or frontal sinus tenderness.      Left Sinus: No maxillary sinus tenderness or frontal sinus tenderness.      Mouth/Throat:      Mouth: No oral lesions.      Pharynx: Uvula midline.      Tonsils: No tonsillar exudate.   Eyes:      Conjunctiva/sclera: Conjunctivae normal.      Pupils: Pupils are equal, round, and reactive to light.      Comments: Exam of the right eye reveals no obvious abnormality.      Neck:      Thyroid: No thyroid mass or thyromegaly.      Vascular: No carotid bruit or JVD.      Trachea: Trachea normal. No tracheal deviation.   Cardiovascular:      Rate and Rhythm: Normal rate and regular rhythm.  No extrasystoles are present.     Chest Wall: PMI is not displaced.      Heart sounds: Normal heart sounds. No murmur heard.  Pulmonary:      Effort: Pulmonary effort is " normal. No accessory muscle usage or respiratory distress.      Breath sounds: Normal breath sounds. No decreased breath sounds, wheezing, rhonchi or rales.   Abdominal:      General: Bowel sounds are normal. There is no distension.      Palpations: Abdomen is soft.      Tenderness: There is no abdominal tenderness.   Musculoskeletal:      Cervical back: Neck supple.   Lymphadenopathy:      Cervical: No cervical adenopathy.   Skin:     General: Skin is warm and dry.      Findings: No rash.      Nails: There is no clubbing.   Neurological:      General: No focal deficit present.      Mental Status: He is alert and oriented to person, place, and time. Mental status is at baseline.      Cranial Nerves: No cranial nerve deficit.      Motor: No weakness.      Coordination: Coordination normal.      Gait: Gait normal.   Psychiatric:         Speech: Speech normal.         Behavior: Behavior normal.         Thought Content: Thought content normal.         Judgment: Judgment normal.            Result Review :    Lipid Panel    Lipid Panel 3/8/22   Total Cholesterol 191   Triglycerides 99   HDL Cholesterol 49   VLDL Cholesterol 18   LDL Cholesterol  124 (A)   LDL/HDL Ratio 2.49   (A) Abnormal value            Data reviewed: Radiologic studies MRI of right shoulder this morning (11/07/2022)        Future Appointments   Date Time Provider Department Center   3/22/2023  3:30 PM Mo Marques MD [facility]        Assessment and Plan   Diagnoses and all orders for this visit:    1. Amaurosis fugax of right eye (Primary)  -     Cancel: US Carotid Bilateral; Future  -     Ambulatory Referral for Diabetic Eye Exam-Ophthalmology  -     US Carotid Bilateral; Future    2. Rotator cuff syndrome of right shoulder  -     Ambulatory Referral to Orthopedic Surgery    Other orders  -     rosuvastatin (Crestor) 20 MG tablet; Take 1 tablet by mouth Daily.  Dispense: 30 tablet; Refill: 6           I spent 42 minutes caring for Ayan on this  date of service. This time includes time spent by me in the following activities:preparing for the visit, reviewing tests, performing a medically appropriate examination and/or evaluation , counseling and educating the patient/family/caregiver, ordering medications, tests, or procedures, referring and communicating with other health care professionals  and documenting information in the medical record     To further evaluate episode of amaurosis fugax, orders placed for bilateral carotid Dopplers. We will notify patient with results.  We will refer to Dr. Gallego, ophthalmology.  Start 162 mg aspirin and Crestor 20 mg q.d.  Instructed to go immediately to the ER for recurrence of symptoms.      We will notify Dr. Crawford's office that MRI of right shoulder was completed this morning demonstrating full thickness tear of the biceps tendon and other rotator cuff abnormalities so he can be scheduled for appointment to discuss options.       Return 03/2023 for next routine follow up appointment with fasting labs one week prior or sooner if needed.     Scribed for Dr. Marques by Whit Arreola [facility].     Follow Up   Return if symptoms worsen or fail to improve, for Next scheduled follow up.  Patient was given instructions and counseling regarding his condition or for health maintenance advice. Please see specific information pulled into the AVS if appropriate.

## 2022-11-21 ENCOUNTER — OFFICE VISIT (OUTPATIENT)
Dept: FAMILY MEDICINE CLINIC | Facility: CLINIC | Age: 62
End: 2022-11-21

## 2022-11-21 VITALS
HEIGHT: 71 IN | DIASTOLIC BLOOD PRESSURE: 84 MMHG | HEART RATE: 68 BPM | SYSTOLIC BLOOD PRESSURE: 150 MMHG | TEMPERATURE: 97.1 F | BODY MASS INDEX: 29.37 KG/M2 | WEIGHT: 209.8 LBS

## 2022-11-21 DIAGNOSIS — I10 EPISODE OF HYPERTENSION: ICD-10-CM

## 2022-11-21 DIAGNOSIS — M25.362 INSTABILITY OF KNEE JOINT, LEFT: ICD-10-CM

## 2022-11-21 DIAGNOSIS — M17.12 PRIMARY OSTEOARTHRITIS OF LEFT KNEE: Primary | ICD-10-CM

## 2022-11-21 DIAGNOSIS — G45.3 AMAUROSIS FUGAX OF RIGHT EYE: ICD-10-CM

## 2022-11-21 DIAGNOSIS — M75.101 ROTATOR CUFF SYNDROME OF RIGHT SHOULDER: ICD-10-CM

## 2022-11-21 PROCEDURE — 99214 OFFICE O/P EST MOD 30 MIN: CPT | Performed by: INTERNAL MEDICINE

## 2022-11-21 NOTE — PROGRESS NOTES
"Chief Complaint  Knee Pain (Left one x 2-3 months and getting worse. He had surgery 15-20 years ago )    Subjective        History of Present Illness     Ayan Shipman is our 62-year-old male with osteoarthritis involving multiple joints. He presents to the office today reporting worsening left posterior knee pain with occasional instability for the past 2-3 months despite taking Celebrex 200 mg daily as well as two Aleve about twice weekly prior at bedtime.   Pain varies from day to day with the knee pain keeping him awake about one night weekly.  More often, sleep is disrupted by chronic right shoulder pain.  Due to the knee pain he is having difficulty navigating stairs and difficulty flexing the knee.  He had surgical repair of left meniscus tear by Dr. Mccabe approximately 15 years ago. He tried wearing a knee brace, which caused abrasion to the skin.        Patient had MRI of right shoulder (11/07/2022) revealing rotator cuff abnormality including full thickness tear of the biceps tendon with retraction insertional bursal surface.  He has an appointment scheduled with Dr. Mccabe next week (12/01/2022) to follow up.     We referred to Dr. Gallego, ophthalmology, 2 weeks ago for an episode of amaurosis fugax.  We started him on 162 mg aspirin and Crestor 20 mg q.d.  We checked carotid ultrasound, which did not reveal any amount of cholesterol plaque in the carotid arteries that would limit blood flow or cause his recent symptoms.  He denies any additional episodes.  He had not been contacted despite checking with their office.  We contacted Dr. Gallego's office today and they scheduled patient to be seen December 8th at 1:30 p.m..        BP above goal today.  He reports BP typically runs at goal.  He admits he took both the Celebrex and Aleve last evening which could be playing a role.        Objective   Vital Signs:  /84   Pulse 68   Temp 97.1 °F (36.2 °C) (Tympanic)   Ht 180.3 cm (71\")   Wt 95.2 " "kg (209 lb 12.8 oz)   BMI 29.26 kg/m²   Estimated body mass index is 29.26 kg/m² as calculated from the following:    Height as of this encounter: 180.3 cm (71\").    Weight as of this encounter: 95.2 kg (209 lb 12.8 oz).          Physical Exam  Constitutional:       General: He is not in acute distress.     Appearance: He is well-developed.      Comments: Pleasant male, obese.    HENT:      Head: Normocephalic and atraumatic.      Nose: Nose normal.      Mouth/Throat:      Pharynx: No oropharyngeal exudate.   Eyes:      Pupils: Pupils are equal, round, and reactive to light.   Neck:      Thyroid: No thyromegaly.      Vascular: No JVD.   Cardiovascular:      Rate and Rhythm: Normal rate and regular rhythm.      Heart sounds: Normal heart sounds.   Pulmonary:      Effort: Pulmonary effort is normal. No accessory muscle usage or respiratory distress.      Breath sounds: Normal breath sounds. No wheezing or rales.   Abdominal:      General: Bowel sounds are normal. There is no distension.      Palpations: Abdomen is soft.      Tenderness: There is no abdominal tenderness.   Musculoskeletal:      Cervical back: Neck supple.      Comments: Exam of the left knee reveals crepitation and medial laxity.  Negative drawer sign.      Lymphadenopathy:      Cervical: No cervical adenopathy.   Neurological:      Mental Status: He is alert and oriented to person, place, and time.      Cranial Nerves: No cranial nerve deficit.   Psychiatric:         Speech: Speech normal.         Behavior: Behavior normal.         Thought Content: Thought content normal.         Judgment: Judgment normal.            Result Review :    Renal Profile    Renal Profile 3/8/22   BUN 13   Creatinine 0.94           Data reviewed: Radiologic studies Knee x-ray today, also carotid ultrasound 11/07/2022/MRI of right shoulder (11/07/2022)       Future Appointments   Date Time Provider Department Center   12/1/2022  2:30 PM Ayan Mccabe MD [facility] " [facility]   3/22/2023  3:30 PM Mo Marques MD [facility]        Assessment and Plan   Diagnoses and all orders for this visit:    1. Primary osteoarthritis of left knee (Primary)  -     XR Knee 3 View Left  -     MRI Knee Left Without Contrast; Future  -     XR Knee 3 View Left    2. Instability of knee joint, left  -     XR Knee 3 View Left  -     MRI Knee Left Without Contrast; Future  -     XR Knee 3 View Left    3. Amaurosis fugax of right eye    4. Rotator cuff syndrome of right shoulder    5. Episode of hypertension                Continue the Celebrex 200 mg.  I asked him not to take any other NSAIDs while taking the Celebrex.  He can add 2 Tylenol Arthritis twice daily if needed.   Order is placed for x-ray of the left knee.  Since patient has already failed conservative therapy NSAIDs, we will go ahead and refer for MRI of the left knee.  Recommended he try topical Hempvana.  Since the weather is colder, he may need to wear a protective layer to help tolerate the knee brace and keeping from causing abrasion to the skin.  X-rays reveal arthritic changes, some cartilage loss, and evidence of a small effusion.  Dr. Mccabe performed arthroscopic surgery on this knee several years ago.  He will be seeing Dr. Mccabe to address the right shoulder issues soon and they can determine how to proceed at that point.    BP is above goal today, which could be partially due to the sodium in both Celebrex and Aleve yesterday.  Continue to monitor BP at home and notify us if systolic BP is >140.  His blood pressure is usually well controlled.    We contacted Dr. Gallego's office and patient is scheduled for December 1st to follow up on the episode of  amaurosis fugax.  There have been no further episodes.  He denies any additional episodes. Continue 162 mg aspirin and Crestor 20 mg q.d     Return 03/22/2023 for routine follow up with fasting labs one week prior or sooner if needed.     Scribed for Dr. Marques by Whit  Bessie Arreola.       Follow Up   Return for Next scheduled follow up.  Patient was given instructions and counseling regarding his condition or for health maintenance advice. Please see specific information pulled into the AVS if appropriate.

## 2022-12-01 ENCOUNTER — OFFICE VISIT (OUTPATIENT)
Dept: ORTHOPEDIC SURGERY | Facility: CLINIC | Age: 62
End: 2022-12-01

## 2022-12-01 VITALS — HEIGHT: 71 IN | BODY MASS INDEX: 28.85 KG/M2 | WEIGHT: 206.1 LBS

## 2022-12-01 DIAGNOSIS — M19.011: Primary | ICD-10-CM

## 2022-12-01 DIAGNOSIS — M25.511 CHRONIC RIGHT SHOULDER PAIN: ICD-10-CM

## 2022-12-01 DIAGNOSIS — M75.111 PARTIAL NONTRAUMATIC TEAR OF ROTATOR CUFF, RIGHT: ICD-10-CM

## 2022-12-01 DIAGNOSIS — G89.29 CHRONIC RIGHT SHOULDER PAIN: ICD-10-CM

## 2022-12-01 PROCEDURE — 20610 DRAIN/INJ JOINT/BURSA W/O US: CPT | Performed by: ORTHOPAEDIC SURGERY

## 2022-12-01 PROCEDURE — 99203 OFFICE O/P NEW LOW 30 MIN: CPT | Performed by: ORTHOPAEDIC SURGERY

## 2022-12-01 RX ADMIN — LIDOCAINE HYDROCHLORIDE 1 ML: 10 INJECTION, SOLUTION INFILTRATION; PERINEURAL at 15:43

## 2022-12-01 RX ADMIN — TRIAMCINOLONE ACETONIDE 80 MG: 40 INJECTION, SUSPENSION INTRA-ARTICULAR; INTRAMUSCULAR at 15:43

## 2022-12-01 NOTE — PROGRESS NOTES
yAan Shipman is a 62 y.o. male   Primary provider:  Mo Marques MD       Chief Complaint   Patient presents with   • Right Shoulder - Pain       HISTORY OF PRESENT ILLNESS: Patient is here today for right shoulder pain. He had xrays and MRI prior to visit.   Patient reports that he has been having pain for several years involving his right shoulder.  No specific injury.  Pain is slowly getting worse.    He has tingling along the inside of his arm down to his hand.  He has occasional sharp pains.  He has had a previous steroid injection which did not help.  He has pain with activities of daily living and has pain that affects his sleep.    Pain  This is a new problem. The current episode started more than 1 month ago. The problem occurs constantly. The problem has been rapidly worsening. Associated symptoms include arthralgias, joint swelling, myalgias and numbness. The symptoms are aggravated by exertion. He has tried acetaminophen and NSAIDs for the symptoms. The treatment provided no relief.        CONCURRENT MEDICAL HISTORY:    Past Medical History:   Diagnosis Date   • Class 1 obesity due to excess calories with serious comorbidity and body mass index (BMI) of 30.0 to 30.9 in adult 3/8/2021   • Erectile dysfunction 2/26/2019   • GERD (gastroesophageal reflux disease)    • Hammertoes of both feet 3/8/2021   • Hemorrhoids    • Hypercholesterolemia    • Impaired fasting glucose 3/8/2021   • Impotence    • Overweight (BMI 25.0-29.9) 2/26/2019   • Palpitations    • Primary osteoarthritis involving multiple joints 10/8/2021   • Primary osteoarthritis of both knees 2/26/2019   • Shoulder pain     Sprain of shoulder and upper arm - right; improved          No Known Allergies      Current Outpatient Medications:   •  acetaminophen (TYLENOL) 500 MG tablet, Take 500 mg by mouth Every 6 (Six) Hours As Needed for Mild Pain ., Disp: , Rfl:   •  Ascorbic Acid (VITAMIN C PO), Take  by mouth., Disp: , Rfl:   •  Calcium  Carbonate (CALCIUM 500 PO), Take  by mouth., Disp: , Rfl:   •  celecoxib (CeleBREX) 200 MG capsule, Take 1 capsule by mouth Daily. With food, Disp: 30 capsule, Rfl: 2  •  Nutritional Supplements (VITAMIN D BOOSTER PO), Take  by mouth., Disp: , Rfl:   •  rosuvastatin (Crestor) 20 MG tablet, Take 1 tablet by mouth Daily., Disp: 30 tablet, Rfl: 6  •  sildenafil (VIAGRA) 100 MG tablet, Take 1 tablet by mouth Daily As Needed for Erectile Dysfunction., Disp: 24 tablet, Rfl: 3  •  triamcinolone (KENALOG) 0.1 % cream, Apply 1 application topically to the appropriate area as directed 3 (Three) Times a Day. For itching, Disp: 80 g, Rfl: 0  •  zinc sulfate (ZINCATE) 220 (50 Zn) MG capsule, Take 220 mg by mouth Daily., Disp: , Rfl:     Past Surgical History:   Procedure Laterality Date   • COLONOSCOPY N/A 7/2/2021    Procedure: COLONOSCOPY;  Surgeon: Kyle Thakur DO;  Location: [facility] ENDOSCOPY;  Service: Gastroenterology;  Laterality: N/A;   • ENDOSCOPY AND COLONOSCOPY  03/17/2010    Internal grade I hemorrhoids found in the anus   • HERNIA REPAIR     • INJECTION OF MEDICATION  07/08/2013    Kenalog (3)      • KNEE SURGERY         Family History   Problem Relation Age of Onset   • Diabetes Other    • Heart disease Other    • Hypertension Other    • Arthritis Other    • Stroke Mother    • Arthritis Mother    • Hyperlipidemia Mother    • ALS Father    • Early death Father         ALS   • Diabetes Brother    • Heart disease Brother    • Cancer Brother    • Diabetes Brother         Social History     Socioeconomic History   • Marital status:    Tobacco Use   • Smoking status: Never   • Smokeless tobacco: Never   Substance and Sexual Activity   • Alcohol use: No   • Drug use: No        Review of Systems   Constitutional: Negative.    HENT: Negative.    Eyes: Negative.    Respiratory: Negative.    Cardiovascular: Negative.    Gastrointestinal: Negative.    Endocrine: Negative.    Genitourinary: Negative.   "  Musculoskeletal: Positive for arthralgias, joint swelling and myalgias.        Stiffness   Skin: Negative.    Allergic/Immunologic: Negative.    Neurological: Positive for numbness.        Tingling   Hematological: Negative.    Psychiatric/Behavioral: Positive for sleep disturbance.       PHYSICAL EXAMINATION:       Ht 180.3 cm (71\")   Wt 93.5 kg (206 lb 1.6 oz)   BMI 28.75 kg/m²     Physical Exam  Constitutional:       General: He is not in acute distress.     Appearance: Normal appearance.   Pulmonary:      Effort: Pulmonary effort is normal. No respiratory distress.   Neurological:      Mental Status: He is alert and oriented to person, place, and time.         GAIT:     []  Normal  []  Antalgic    Assistive device: []  None  []  Walker     []  Crutches  []  Cane     []  Wheelchair  []  Stretcher    Right Shoulder Exam     Tenderness   The patient is experiencing tenderness in the acromioclavicular joint and acromion.    Range of Motion   Active abduction: 160   External rotation: 60   Forward flexion: 170   Internal rotation 90 degrees: 60     Muscle Strength   Abduction: 4/5   Supraspinatus: 4/5     Tests   Sr test: positive  Cross arm: positive  Impingement: positive    Other   Erythema: absent  Sensation: normal  Pulse: present              XR Knee 3 View Left    Result Date: 11/21/2022  Narrative: EXAM: XR KNEE 3 VIEWS COMPARISON: None INDICATION: chronic left knee pain and instability FINDINGS: 3 view left knee. No acute fracture or dislocation. No suspicious osseous lesion. Medial compartment joint space narrowing. Marginal osteophytes of the femoral condyles and tibial plateaus. There are patellar osteophytes. There is a suprapatellar joint effusion.     Impression: No acute osseous abnormality. Tricompartmental osteoarthritis of the left knee with joint effusion. Electronically signed by:  Fernando Mcarthur MD  11/21/2022 3:28 PM CST Workstation: [id number]    MRI Shoulder Right Without " Contrast    Result Date: 11/7/2022  Narrative: This result has an attachment that is not available. PROCEDURE: MR SHOULDER WITHOUT IV CONTRAST INDICATION:  Unspecified rotator cuff tear or rupture of right shoulder, not specified as traumatic; pt c/o rt shoulder pain, limited ROM COMPARISON: Shoulder radiograph August 9, 2022 PROCEDURE: Multiplanar, multisequence MR images of the shoulder obtained without contrast FINDINGS: ROTATOR CUFF: Supraspinatus: Insertional bursal surface tear measuring 0.7 x 0.7 cm Infraspinatus: Tendinopathy. Small insertional interstitial tear Subscapularis: Tendinopathy. Interstitial tear of the cranial fibers Teres minor: Intact BURSA: Small amount of fluid in the subacromial/subdeltoid bursa MUSCULATURE: No edema or atrophy. ACROMIOCLAVICULAR JOINT: Degenerative changes. Small effusion, capsular hypertrophy and osseous edema BONES: No fracture or marrow edema. LONG HEAD BICEPS TENDON: Nonvisualized, likely chronically torn GLENOHUMERAL JOINT: Joint fluid: Moderate effusion.. Layering debris in the axillary recess. Loose body in the subcoracoid recess measuring 1.2 cm. Cartilage: Thinning of the joint space narrowing. Nipple thickness cartilage loss humeral osteophyte and flattening of the glenoid. Subchondral cysts. Superior loose body measuring 0.6 cm. Layering debris in the axillary recess Labrum: Circumferential labral tear/degeneration. Superior glenoid osteophyte. Glenohumeral ligaments: No ligamentous abnormality.    Impression: Glenohumeral osteoarthritis with debris in the axillary recess/multiple loose bodies Chronic full thickness tear of the biceps tendon with retraction Insertional bursal surface tear of the supraspinatus tendon. Tendinopathy and interstitial tear of the subscapularis and infraspinatus Degenerative changes at the AC joint. Workstation: [id number]    US Carotid Bilateral    Result Date: 11/7/2022  Narrative: EXAM: US CAROTID DOPPLER BILATERAL INDICATION:   Transient vision loss of the right eye for 2 to 3 minutes, G45.3 Amaurosis fugax COMPARISON: None TECHNIQUE: Grayscale and color Doppler ultrasound images with spectral analysis were obtained of the carotid arteries. FINDINGS: Right Carotid: Doppler spectrum analysis:  CCA peak systolic: 121 cm/sec. ICA peak systolic: 78 cm/sec. ECA peak systolic 89 cm/sec. ICA/CCA ratio: 0.64 Atherosclerosis: Less than 50% atherosclerotic narrowing at the right carotid bulb and proximal ICA. Vertebral artery flow is antegrade. Left Carotid: Doppler spectrum analysis: CCA peak systolic: 125 cm/sec. ICA peak systolic: 74 cm/sec. ECA peak systolic 68 cm/sec ICA/CCA ratio: 0.59 Atherosclerosis: Atherosclerosis at the left carotid bulb and proximal ICA, less than 50%. Vertebral artery flow is antegrade.     Impression: 1. There is no hemodynamically significant stenosis by velocity criteria. 2. Less than 50% atherosclerotic narrowing at the bilateral carotid bulb and proximal ICA. Electronically signed by:  Fernando Mcarthur MD  11/7/2022 2:31 PM CST Workstation: [id number]          ASSESSMENT:    Diagnoses and all orders for this visit:    Arthrosis of shoulder, right  -     Large Joint Arthrocentesis: R glenohumeral    Chronic right shoulder pain  -     Large Joint Arthrocentesis: R glenohumeral    Partial nontraumatic tear of rotator cuff, right  -     Large Joint Arthrocentesis: R glenohumeral          PLAN    Patient has long history of worsening pain in his right shoulder.  He still maintains decent range of motion but has significant pain with activity.  He has had a prior subacromial injection which was not effective.  We discussed glenohumeral arthritis in the situation of rotator cuff dysfunction as well.  We discussed the eventual possibility of shoulder replacement surgery depending on how he responds.  He will continue taking Celebrex.  We discussed and intra-articular glenohumeral joint injection today.  He will  bring his MRI disc back to the office so that I can review.  Follow-up in 6 weeks.      Large Joint Arthrocentesis: R glenohumeral  Date/Time: 12/1/2022 3:43 PM  Consent given by: patient  Timeout: Immediately prior to procedure a time out was called to verify the correct patient, procedure, equipment, support staff and site/side marked as required   Supporting Documentation  Indications: pain   Procedure Details  Location: shoulder - R glenohumeral  Needle size: 22 G  Medications administered: 1 mL lidocaine 1 %; 80 mg triamcinolone acetonide 40 MG/ML              Return in about 6 weeks (around 1/12/2023) for recheck.    Ayan Mccabe MD

## 2022-12-04 RX ORDER — TRIAMCINOLONE ACETONIDE 40 MG/ML
80 INJECTION, SUSPENSION INTRA-ARTICULAR; INTRAMUSCULAR
Status: COMPLETED | OUTPATIENT
Start: 2022-12-01 | End: 2022-12-01

## 2022-12-04 RX ORDER — LIDOCAINE HYDROCHLORIDE 10 MG/ML
1 INJECTION, SOLUTION INFILTRATION; PERINEURAL
Status: COMPLETED | OUTPATIENT
Start: 2022-12-01 | End: 2022-12-01

## 2022-12-15 DIAGNOSIS — M15.9 PRIMARY OSTEOARTHRITIS INVOLVING MULTIPLE JOINTS: Primary | ICD-10-CM

## 2022-12-15 RX ORDER — CELECOXIB 200 MG/1
CAPSULE ORAL
Qty: 30 CAPSULE | Refills: 2 | Status: SHIPPED | OUTPATIENT
Start: 2022-12-15 | End: 2023-01-12 | Stop reason: ALTCHOICE

## 2023-01-06 ENCOUNTER — TELEPHONE (OUTPATIENT)
Dept: FAMILY MEDICINE CLINIC | Facility: CLINIC | Age: 63
End: 2023-01-06
Payer: COMMERCIAL

## 2023-01-06 DIAGNOSIS — M17.12 PRIMARY OSTEOARTHRITIS OF LEFT KNEE: Primary | ICD-10-CM

## 2023-01-06 NOTE — TELEPHONE ENCOUNTER
Relayed results of MRI knee to patient and he does want a referral to Dr. Mccabe regarding this. Referral placed. TP

## 2023-01-09 DIAGNOSIS — M17.12 PRIMARY OSTEOARTHRITIS OF LEFT KNEE: ICD-10-CM

## 2023-01-09 DIAGNOSIS — M25.362 INSTABILITY OF KNEE JOINT, LEFT: ICD-10-CM

## 2023-01-09 PROCEDURE — 73721 MRI JNT OF LWR EXTRE W/O DYE: CPT | Performed by: INTERNAL MEDICINE

## 2023-01-12 ENCOUNTER — OFFICE VISIT (OUTPATIENT)
Dept: FAMILY MEDICINE CLINIC | Facility: CLINIC | Age: 63
End: 2023-01-12
Payer: COMMERCIAL

## 2023-01-12 VITALS
DIASTOLIC BLOOD PRESSURE: 88 MMHG | HEIGHT: 71 IN | SYSTOLIC BLOOD PRESSURE: 136 MMHG | HEART RATE: 73 BPM | WEIGHT: 205.4 LBS | BODY MASS INDEX: 28.76 KG/M2 | TEMPERATURE: 97 F | OXYGEN SATURATION: 97 %

## 2023-01-12 DIAGNOSIS — R25.3 MUSCLE TWITCHING: ICD-10-CM

## 2023-01-12 DIAGNOSIS — M54.16 RIGHT LUMBAR RADICULOPATHY: Primary | ICD-10-CM

## 2023-01-12 DIAGNOSIS — M17.0 PRIMARY OSTEOARTHRITIS OF BOTH KNEES: Chronic | ICD-10-CM

## 2023-01-12 DIAGNOSIS — M15.9 PRIMARY OSTEOARTHRITIS INVOLVING MULTIPLE JOINTS: Chronic | ICD-10-CM

## 2023-01-12 DIAGNOSIS — E66.3 OVERWEIGHT (BMI 25.0-29.9): Chronic | ICD-10-CM

## 2023-01-12 DIAGNOSIS — I10 DIASTOLIC HYPERTENSION: ICD-10-CM

## 2023-01-12 PROCEDURE — 99214 OFFICE O/P EST MOD 30 MIN: CPT | Performed by: INTERNAL MEDICINE

## 2023-01-12 PROCEDURE — 96372 THER/PROPH/DIAG INJ SC/IM: CPT | Performed by: INTERNAL MEDICINE

## 2023-01-12 RX ORDER — TRIAMCINOLONE ACETONIDE 40 MG/ML
20 INJECTION, SUSPENSION INTRA-ARTICULAR; INTRAMUSCULAR ONCE
Status: COMPLETED | OUTPATIENT
Start: 2023-01-12 | End: 2023-01-12

## 2023-01-12 RX ORDER — METHYLPREDNISOLONE ACETATE 40 MG/ML
40 INJECTION, SUSPENSION INTRA-ARTICULAR; INTRALESIONAL; INTRAMUSCULAR; SOFT TISSUE ONCE
Status: COMPLETED | OUTPATIENT
Start: 2023-01-12 | End: 2023-01-12

## 2023-01-12 RX ORDER — MELOXICAM 15 MG/1
15 TABLET ORAL DAILY PRN
Qty: 30 TABLET | Refills: 1 | Status: SHIPPED | OUTPATIENT
Start: 2023-01-12 | End: 2023-02-14

## 2023-01-12 RX ADMIN — TRIAMCINOLONE ACETONIDE 20 MG: 40 INJECTION, SUSPENSION INTRA-ARTICULAR; INTRAMUSCULAR at 08:38

## 2023-01-12 RX ADMIN — METHYLPREDNISOLONE ACETATE 40 MG: 40 INJECTION, SUSPENSION INTRA-ARTICULAR; INTRALESIONAL; INTRAMUSCULAR; SOFT TISSUE at 08:37

## 2023-01-12 NOTE — PATIENT INSTRUCTIONS
Calorie Counting for Weight Loss  Calories are units of energy. Your body needs a certain number of calories from food to keep going throughout the day. When you eat or drink more calories than your body needs, your body stores the extra calories mostly as fat. When you eat or drink fewer calories than your body needs, your body burns fat to get the energy it needs.  Calorie counting means keeping track of how many calories you eat and drink each day. Calorie counting can be helpful if you need to lose weight. If you eat fewer calories than your body needs, you should lose weight. Ask your health care provider what a healthy weight is for you.  For calorie counting to work, you will need to eat the right number of calories each day to lose a healthy amount of weight per week. A dietitian can help you figure out how many calories you need in a day and will suggest ways to reach your calorie goal.  A healthy amount of weight to lose each week is usually 1-2 lb (0.5-0.9 kg). This usually means that your daily calorie intake should be reduced by 500-750 calories.  Eating 1,200-1,500 calories a day can help most women lose weight.  Eating 1,500-1,800 calories a day can help most men lose weight.  What do I need to know about calorie counting?  Work with your health care provider or dietitian to determine how many calories you should get each day. To meet your daily calorie goal, you will need to:  Find out how many calories are in each food that you would like to eat. Try to do this before you eat.  Decide how much of the food you plan to eat.  Keep a food log. Do this by writing down what you ate and how many calories it had.  To successfully lose weight, it is important to balance calorie counting with a healthy lifestyle that includes regular activity.  Where do I find calorie information?  The number of calories in a food can be found on a Nutrition Facts label. If a food does not have a Nutrition Facts label, try to  look up the calories online or ask your dietitian for help.  Remember that calories are listed per serving. If you choose to have more than one serving of a food, you will have to multiply the calories per serving by the number of servings you plan to eat. For example, the label on a package of bread might say that a serving size is 1 slice and that there are 90 calories in a serving. If you eat 1 slice, you will have eaten 90 calories. If you eat 2 slices, you will have eaten 180 calories.  How do I keep a food log?  After each time that you eat, record the following in your food log as soon as possible:  What you ate. Be sure to include toppings, sauces, and other extras on the food.  How much you ate. This can be measured in cups, ounces, or number of items.  How many calories were in each food and drink.  The total number of calories in the food you ate.  Keep your food log near you, such as in a pocket-sized notebook or on an hilario or website on your mobile phone. Some programs will calculate calories for you and show you how many calories you have left to meet your daily goal.  What are some portion-control tips?  Know how many calories are in a serving. This will help you know how many servings you can have of a certain food.  Use a measuring cup to measure serving sizes. You could also try weighing out portions on a kitchen scale. With time, you will be able to estimate serving sizes for some foods.  Take time to put servings of different foods on your favorite plates or in your favorite bowls and cups so you know what a serving looks like.  Try not to eat straight from a food's packaging, such as from a bag or box. Eating straight from the package makes it hard to see how much you are eating and can lead to overeating. Put the amount you would like to eat in a cup or on a plate to make sure you are eating the right portion.  Use smaller plates, glasses, and bowls for smaller portions and to prevent  overeating.  Try not to multitask. For example, avoid watching TV or using your computer while eating. If it is time to eat, sit down at a table and enjoy your food. This will help you recognize when you are full. It will also help you be more mindful of what and how much you are eating.  What are tips for following this plan?  Reading food labels  Check the calorie count compared with the serving size. The serving size may be smaller than what you are used to eating.  Check the source of the calories. Try to choose foods that are high in protein, fiber, and vitamins, and low in saturated fat, trans fat, and sodium.  Shopping  Read nutrition labels while you shop. This will help you make healthy decisions about which foods to buy.  Pay attention to nutrition labels for low-fat or fat-free foods. These foods sometimes have the same number of calories or more calories than the full-fat versions. They also often have added sugar, starch, or salt to make up for flavor that was removed with the fat.  Make a grocery list of lower-calorie foods and stick to it.  Cooking  Try to cook your favorite foods in a healthier way. For example, try baking instead of frying.  Use low-fat dairy products.  Meal planning  Use more fruits and vegetables. One-half of your plate should be fruits and vegetables.  Include lean proteins, such as chicken, turkey, and fish.  Lifestyle  Each week, aim to do one of the followin minutes of moderate exercise, such as walking.  75 minutes of vigorous exercise, such as running.  General information  Know how many calories are in the foods you eat most often. This will help you calculate calorie counts faster.  Find a way of tracking calories that works for you. Get creative. Try different apps or programs if writing down calories does not work for you.  What foods should I eat?    Eat nutritious foods. It is better to have a nutritious, high-calorie food, such as an avocado, than a food with  few nutrients, such as a bag of potato chips.  Use your calories on foods and drinks that will fill you up and will not leave you hungry soon after eating.  Examples of foods that fill you up are nuts and nut butters, vegetables, lean proteins, and high-fiber foods such as whole grains. High-fiber foods are foods with more than 5 g of fiber per serving.  Pay attention to calories in drinks. Low-calorie drinks include water and unsweetened drinks.  The items listed above may not be a complete list of foods and beverages you can eat. Contact a dietitian for more information.  What foods should I limit?  Limit foods or drinks that are not good sources of vitamins, minerals, or protein or that are high in unhealthy fats. These include:  Candy.  Other sweets.  Sodas, specialty coffee drinks, alcohol, and juice.  The items listed above may not be a complete list of foods and beverages you should avoid. Contact a dietitian for more information.  How do I count calories when eating out?  Pay attention to portions. Often, portions are much larger when eating out. Try these tips to keep portions smaller:  Consider sharing a meal instead of getting your own.  If you get your own meal, eat only half of it. Before you start eating, ask for a container and put half of your meal into it.  When available, consider ordering smaller portions from the menu instead of full portions.  Pay attention to your food and drink choices. Knowing the way food is cooked and what is included with the meal can help you eat fewer calories.  If calories are listed on the menu, choose the lower-calorie options.  Choose dishes that include vegetables, fruits, whole grains, low-fat dairy products, and lean proteins.  Choose items that are boiled, broiled, grilled, or steamed. Avoid items that are buttered, battered, fried, or served with cream sauce. Items labeled as crispy are usually fried, unless stated otherwise.  Choose water, low-fat milk,  unsweetened iced tea, or other drinks without added sugar. If you want an alcoholic beverage, choose a lower-calorie option, such as a glass of wine or light beer.  Ask for dressings, sauces, and syrups on the side. These are usually high in calories, so you should limit the amount you eat.  If you want a salad, choose a garden salad and ask for grilled meats. Avoid extra toppings such as sandhu, cheese, or fried items. Ask for the dressing on the side, or ask for olive oil and vinegar or lemon to use as dressing.  Estimate how many servings of a food you are given. Knowing serving sizes will help you be aware of how much food you are eating at restaurants.  Where to find more information  Centers for Disease Control and Prevention: www.cdc.gov  [place] Department of Agriculture: myplate.gov  Summary  Calorie counting means keeping track of how many calories you eat and drink each day. If you eat fewer calories than your body needs, you should lose weight.  A healthy amount of weight to lose per week is usually 1-2 lb (0.5-0.9 kg). This usually means reducing your daily calorie intake by 500-750 calories.  The number of calories in a food can be found on a Nutrition Facts label. If a food does not have a Nutrition Facts label, try to look up the calories online or ask your dietitian for help.  Use smaller plates, glasses, and bowls for smaller portions and to prevent overeating.  Use your calories on foods and drinks that will fill you up and not leave you hungry shortly after a meal.  This information is not intended to replace advice given to you by your health care provider. Make sure you discuss any questions you have with your health care provider.  Document Revised: 01/28/2021 Document Reviewed: 01/28/2021  Elsevier Patient Education © 2022 [employer]

## 2023-01-12 NOTE — PROGRESS NOTES
"Chief Complaint  Pain of Right Lower Extremity  (Pain, swelling, difficulty walking, involuntary jerking )    Subjective        Ayan Shipman presents to [facility]  History of Present Illness    Ayan is here to address new issues involving the right knee and right lower extremity.  He has rather extensive osteoarthritis/DJD and is working with Dr. Mccabe regarding chronic shoulder pain.  We have recently evaluated left knee pain and found him to have osteoarthritis/degenerative changes with some nondescript MRI changes.  We have referred him to Dr. Mccabe for further evaluation of the left knee pain.    He now reports that he was exerting himself quite a bit last Saturday raking leaves and subsequently experienced increased bilateral knee pain and some mild swelling of the right knee without warmth or erythema.  He has been taking Celebrex daily but has found it to be providing suboptimal pain relief.    He is also describing a new problem of pain radiating down the anterior lateral aspect of the right thigh to the lateral aspect of the right lower extremity, but not involving the foot.  He is describing an area of numbness or paresthesia right lateral calf region.  He is also reporting episodic muscle twitching right lateral calf region no bowel or bladder control changes.  The symptoms flared after raking leaves.  He reports a remote history of sciatica, but many years ago.    His diastolic blood pressure is above goal today, but he is in some pain.  We will continue to monitor.    Objective   Vital Signs:  /88 (BP Location: Left arm, Patient Position: Sitting, Cuff Size: Large Adult)   Pulse 73   Temp 97 °F (36.1 °C) (Tympanic)   Ht 180.3 cm (70.98\")   Wt 93.2 kg (205 lb 6.4 oz)   SpO2 97%   BMI 28.66 kg/m²   Estimated body mass index is 28.66 kg/m² as calculated from the following:    Height as of this encounter: 180.3 cm (70.98\").    Weight as " of this encounter: 93.2 kg (205 lb 6.4 oz).             Physical Exam  Constitutional:       General: He is not in acute distress.     Appearance: He is well-developed.      Comments: Very pleasant male, overweight.    HENT:      Head: Normocephalic and atraumatic.      Nose: Nose normal.      Mouth/Throat:      Pharynx: No oropharyngeal exudate.   Eyes:      Pupils: Pupils are equal, round, and reactive to light.   Neck:      Thyroid: No thyromegaly.      Vascular: No JVD.   Cardiovascular:      Rate and Rhythm: Normal rate and regular rhythm.      Heart sounds: Normal heart sounds.   Pulmonary:      Effort: Pulmonary effort is normal. No accessory muscle usage or respiratory distress.      Breath sounds: Normal breath sounds. No wheezing or rales.   Abdominal:      General: There is no distension.      Comments: Overweight abdomen   Musculoskeletal:      Cervical back: Neck supple.      Comments: Exam of the right knee reveals crepitation and medial laxity.  Negative drawer sign.  No effusion, warmth, erythema.    Exam of the lumbar region reveals some inflammation in the right SI region.  No vertebral tenderness or malalignment noted.     Lymphadenopathy:      Cervical: No cervical adenopathy.   Neurological:      Mental Status: He is alert and oriented to person, place, and time.      Cranial Nerves: No cranial nerve deficit.   Psychiatric:         Mood and Affect: Mood normal.         Speech: Speech normal.         Behavior: Behavior normal.         Thought Content: Thought content normal.         Judgment: Judgment normal.        Result Review :      Data reviewed: Radiologic studies Lumbar and knee x-rays             Assessment and Plan   Diagnoses and all orders for this visit:    1. Right lumbar radiculopathy (Primary)  -     methylPREDNISolone acetate (DEPO-medrol) injection 40 mg  -     triamcinolone acetonide (KENALOG-40) injection 20 mg  -     XR Spine Lumbar Complete 4+VW    2. Primary osteoarthritis  of both knees  -     XR Knee 3 View Right    3. Primary osteoarthritis involving multiple joints    4. Overweight (BMI 25.0-29.9)    5. Diastolic hypertension    6. Muscle twitching    Other orders  -     meloxicam (MOBIC) 15 MG tablet; Take 1 tablet by mouth Daily As Needed for Moderate Pain (pain). Take with food  Dispense: 30 tablet; Refill: 1    X-rays of the bilateral knees reveal arthritic changes and loss of cartilage space medially.  He has tried Osteo Bi-Flex in the past without any improvement.  We will change Celebrex to Mobic 15 mg daily as needed with the understanding that Mobic has the potential to irritate the upper GI tract a bit more than Celebrex.  He can continue to use Tylenol products per label directions in addition to the Mobic as needed.  To help address all of these issues today, patient receives injection of Kenalog 20 mg and Depo-Medrol 40 mg to the right buttock without difficulty.  Initiated discussion about the possible need to avoid activities that aggravate his joint pains, such as raking leaves.  He has some grandsons who could do this job for him.  Keep the scheduled orthopedic appointment with Dr. Mccabe to evaluate the knees.  He also has an additional appointment with Dr. Mccabe.  Further evaluate shoulders.    The symptoms of right lumbar radiculopathy resulting in pain and numbness and muscle twitching is a new issue. Lumbar x-rays reveal evidence of DJD and some spondylolisthesis, most notable at L2/L3.  Steroid injection to the right buttock as noted above.  Mobic 15 mg daily with food as needed, watching for GI intolerance.  I did not prescribe a muscle relaxer, as he does not have any actual low back pain or evidence of muscle spasm in the lumbar region.  If he fails to respond to conservative therapy, I would entertain obtaining lumbar MRI and referral to physical therapy.    Diastolic blood pressure has been above goal on his last visits.  Pursue sodium restriction and  weight loss.  We will continue to monitor.  Start blood pressure medication soon if this issue persists.  We also reminded him of the value of weight loss due to all of his arthritic aches and pains.       I spent 33 minutes caring for Ayan on this date of service. This time includes time spent by me in the following activities:preparing for the visit, reviewing tests, performing a medically appropriate examination and/or evaluation , counseling and educating the patient/family/caregiver, ordering medications, tests, or procedures and documenting information in the medical record  Follow Up   Return if symptoms worsen or fail to improve, for Next scheduled follow up - labs 1 week prior.  Patient was given instructions and counseling regarding his condition or for health maintenance advice. Please see specific information pulled into the AVS if appropriate.

## 2023-01-20 ENCOUNTER — OFFICE VISIT (OUTPATIENT)
Dept: ORTHOPEDIC SURGERY | Facility: CLINIC | Age: 63
End: 2023-01-20
Payer: COMMERCIAL

## 2023-01-20 VITALS — BODY MASS INDEX: 28.64 KG/M2 | WEIGHT: 204.6 LBS | HEIGHT: 71 IN

## 2023-01-20 DIAGNOSIS — M17.0 PRIMARY OSTEOARTHRITIS OF BOTH KNEES: Primary | ICD-10-CM

## 2023-01-20 DIAGNOSIS — M25.561 ACUTE PAIN OF RIGHT KNEE: ICD-10-CM

## 2023-01-20 DIAGNOSIS — M75.101 ROTATOR CUFF SYNDROME OF RIGHT SHOULDER: Chronic | ICD-10-CM

## 2023-01-20 DIAGNOSIS — M23.91 INTERNAL DERANGEMENT OF RIGHT KNEE: ICD-10-CM

## 2023-01-20 DIAGNOSIS — M19.011 PRIMARY OSTEOARTHRITIS OF RIGHT SHOULDER: Chronic | ICD-10-CM

## 2023-01-20 DIAGNOSIS — G89.29 CHRONIC PAIN OF LEFT KNEE: ICD-10-CM

## 2023-01-20 DIAGNOSIS — M25.562 CHRONIC PAIN OF LEFT KNEE: ICD-10-CM

## 2023-01-20 PROCEDURE — 99213 OFFICE O/P EST LOW 20 MIN: CPT | Performed by: ORTHOPAEDIC SURGERY

## 2023-01-20 NOTE — PROGRESS NOTES
Ayan Shipman is a 62 y.o. male   Primary provider:  Mo Marques MD       Chief Complaint   Patient presents with   • Left Knee - Pain       HISTORY OF PRESENT ILLNESS: Patient is here today for left knee pain. He had xrays prior to visit.   Patient complains of pain in both legs and both knees.  No specific injury.  He has chronic dull achy pain with occasional sharp pains.  He has pain with activities of daily living.  His functional activity is limited by the stiffness and pain in his knees.  No numbness or tingling.    Pain  This is a chronic problem. The current episode started more than 1 year ago. The problem occurs intermittently. The problem has been waxing and waning. Associated symptoms include arthralgias, joint swelling and myalgias. The symptoms are aggravated by walking. He has tried rest (injections) for the symptoms. The treatment provided significant relief.        CONCURRENT MEDICAL HISTORY:    Past Medical History:   Diagnosis Date   • Class 1 obesity due to excess calories with serious comorbidity and body mass index (BMI) of 30.0 to 30.9 in adult 3/8/2021   • Erectile dysfunction 2/26/2019   • GERD (gastroesophageal reflux disease)    • Hammertoes of both feet 3/8/2021   • Hemorrhoids    • Hypercholesterolemia    • Impaired fasting glucose 3/8/2021   • Impotence    • Overweight (BMI 25.0-29.9) 2/26/2019   • Palpitations    • Primary osteoarthritis involving multiple joints 10/8/2021   • Primary osteoarthritis of both knees 2/26/2019   • Shoulder pain     Sprain of shoulder and upper arm - right; improved          No Known Allergies      Current Outpatient Medications:   •  acetaminophen (TYLENOL) 500 MG tablet, Take 500 mg by mouth Every 6 (Six) Hours As Needed for Mild Pain ., Disp: , Rfl:   •  Ascorbic Acid (VITAMIN C PO), Take  by mouth., Disp: , Rfl:   •  meloxicam (MOBIC) 15 MG tablet, Take 1 tablet by mouth Daily As Needed for Moderate Pain (pain). Take with food, Disp: 30  "tablet, Rfl: 1  •  Nutritional Supplements (VITAMIN D BOOSTER PO), Take  by mouth., Disp: , Rfl:   •  rosuvastatin (Crestor) 20 MG tablet, Take 1 tablet by mouth Daily., Disp: 30 tablet, Rfl: 6    Past Surgical History:   Procedure Laterality Date   • COLONOSCOPY N/A 7/2/2021    Procedure: COLONOSCOPY;  Surgeon: Kyle Thakur DO;  Location: [facility] ENDOSCOPY;  Service: Gastroenterology;  Laterality: N/A;   • ENDOSCOPY AND COLONOSCOPY  03/17/2010    Internal grade I hemorrhoids found in the anus   • HERNIA REPAIR     • INJECTION OF MEDICATION  07/08/2013    Kenalog (3)      • KNEE SURGERY         Family History   Problem Relation Age of Onset   • Diabetes Other    • Heart disease Other    • Hypertension Other    • Arthritis Other    • Stroke Mother    • Arthritis Mother    • Hyperlipidemia Mother    • ALS Father    • Early death Father         ALS   • Diabetes Brother    • Heart disease Brother    • Cancer Brother    • Diabetes Brother        Social History     Socioeconomic History   • Marital status:    Tobacco Use   • Smoking status: Never   • Smokeless tobacco: Never   Substance and Sexual Activity   • Alcohol use: No   • Drug use: No        Review of Systems   Constitutional: Negative.    HENT: Negative.    Eyes: Negative.    Respiratory: Negative.    Cardiovascular: Negative.    Gastrointestinal: Negative.    Endocrine: Negative.    Genitourinary: Negative.    Musculoskeletal: Positive for arthralgias, joint swelling and myalgias.        Stiffness   Skin: Negative.    Allergic/Immunologic: Negative.    Neurological: Negative.    Hematological: Negative.    Psychiatric/Behavioral: Positive for sleep disturbance.       PHYSICAL EXAMINATION:       Ht 180.3 cm (70.98\")   Wt 92.8 kg (204 lb 9.6 oz)   BMI 28.55 kg/m²     Physical Exam  Constitutional:       General: He is not in acute distress.     Appearance: Normal appearance.   Pulmonary:      Effort: Pulmonary effort is normal. No respiratory " distress.   Neurological:      Mental Status: He is alert and oriented to person, place, and time.         GAIT:     []  Normal  []  Antalgic    Assistive device: []  None  []  Walker     []  Crutches  []  Cane     []  Wheelchair  []  Stretcher    Right Knee Exam     Muscle Strength   The patient has normal right knee strength.    Tenderness   Right knee tenderness location: diffuse.    Range of Motion   Extension: -5   Flexion: 120     Tests   Varus: negative Valgus: negative  Drawer:  Anterior - negative    Posterior - negative    Other   Sensation: normal  Pulse: present  Swelling: mild    Comments:  Crepitation on motion.  Mild to moderate pain through arc of motion      Left Knee Exam     Muscle Strength   The patient has normal left knee strength.    Tenderness   Left knee tenderness location: diffuse.    Range of Motion   Extension: -5   Flexion: 120     Tests   Varus: negative Valgus: negative  Drawer:  Anterior - negative     Posterior - negative    Other   Sensation: normal  Pulse: present  Swelling: none    Comments:  Crepitation with motion  Mild to moderate pain through arc of motion                  XR Knee 3 View Right    Result Date: 1/16/2023  Narrative: EXAM: XR KNEE 3 VIEWS RIGHT HISTORY: worsening right knee pain, M17.0 Bilateral primary osteoarthritis of knee. COMPARISON: November 21, 2022 FINDINGS: Mineralization: Normal Bones: Mild to moderate medial compartment joint space narrowing with subchondral sclerosis and small marginal osteophytes. Mild lateral osteophytes. Moderate patellofemoral joint space narrowing with marginal osteophytes. Moderate medial compartment joint space narrowing in the left knee. Soft tissues: Normal     Impression: Tricompartmental osteoarthritis, moderate in the medial compartment and patellofemoral joint Electronically signed by:  Cole Allen DO  1/16/2023 4:17 PM CST Workstation: [id number]    MRI Knee Left Without Contrast    Result Date: 1/4/2023  Narrative:  INDICATION: Other instability, left knee, Unilateral primary osteoarthritis, left knee; LEFT KNEE PAIN EXAMINATION: MRI - MR KNEE WITHOUT IV CONTRAST TECHNIQUE: Multiplanar and multisequence MR images of the left knee. IV Contrast Dosage and Agent: None. COMPARISON: MRI left knee from 1/10/2013 ____________________________________________ FINDINGS: BONE: There is mild bone marrow edema centrally within the proximal tibia at the level of the tibial spines. There is no evidence of any discrete fracture. JOINT: There is a small joint effusion. MUSCLES: Unremarkable. MENISCI: There is a radial tear at the posterior root of the medial meniscus. There is mild medial meniscal extrusion. Horizontal increased signal within the body of the medial meniscus may communicate with the undersurface of the meniscus. Nondisplaced horizontal cleavage tear is suspected. Both these findings were present on the study from 2013. Lateral meniscus is intact. CRUCIATE LIGAMENTS: There is increased signal within the posterior cruciate ligament consistent with an interstitial tear. Alignment is otherwise intact. There is diffuse edema involving the ACL. Some fibers are intact. This is a significant change from prior study. Appearance is suspicious for partial tear or myxoid degeneration. COLLATERAL LIGAMENTS: Medial collateral ligament and lateral collateral ligamentous structures are intact. Patellar femoral ligaments appear normal. CARTILAGE: There is significant cartilage loss along the medial tibial plateau and posterior aspect of the medial femoral condyle. Some subtle subchondral cystic changes are seen within the femur and tibia. Cartilage in the lateral compartment is intact.  Cartilage at the patellofemoral joint appears intact. OTHER SOFT TISSUES: There is mild subcutaneous edema anteriorly over the patellar tendon.    Impression: 1.  Tear involving posterior root of medial meniscus and body of medial meniscus appears similar to the  study from 2013. There is mild medial meniscal extrusion. 2.  Abnormal appearance of the ACL. Fibers are intact. There is diffuse increased T2 signal. Findings may represent myxoid degeneration of the ACL. 3.  Diffuse increased signal within the posterior cruciate ligament consistent with interstitial tear 4.  Significant cartilage loss medial compartment. Workstation: [id number]    XR Spine Lumbar Complete 4+VW    Result Date: 1/16/2023  Narrative: PROCEDURE: XR LUMBAR SPINE 4 OR MORE VIEWS INDICATION:  right sciatica, M54.16 Radiculopathy, lumbar region COMPARISON:  None available FINDINGS:  Alignment: Normal Vertebral bodies: Normal in height L1-L2: Moderate disc space narrowing. Endplate sclerosis. Anterior and posterior osteophytes. Facet arthropathy. L2-L3: Moderate disc space narrowing. Anterior and posterior osteophytes. Bilateral facet arthropathy. L3-L4: Mild facet arthropathy L4-L5: Mild facet arthropathy L5-S1: Moderate disc space narrowing. Facet arthropathy. Soft tissues: Normal     Impression: Mild to moderate multilevel spondylosis Electronically signed by:  Cole Allen DO  1/16/2023 4:14 PM [facility] Workstation: [id number]          ASSESSMENT:    Diagnoses and all orders for this visit:    Primary osteoarthritis of both knees    Chronic pain of left knee    Acute pain of right knee    Internal derangement of right knee          PLAN    Long discussion was held the patient regarding further treatment options.  He has been taking meloxicam and was encouraged to continue to do so.  He has mild to moderate arthritic changes noted in both knees.  We discussed the probability of total knee arthroplasty especially in his left knee.  He is having more recent mechanical symptoms involving his right knee.  We discussed the possibility of arthroscopy of the right knee but that primarily baseline arthritic change seem to be at the root of his problems.  We discussed the possibility of injection.  Possible  viscosupplementation.  Possible physical therapy.  Patient will continue with activity modification and will follow-up as needed to institute any of the above-mentioned options.    Return if symptoms worsen or fail to improve, for recheck.    Ayan Mccabe MD

## 2023-02-14 RX ORDER — MELOXICAM 15 MG/1
TABLET ORAL
Qty: 30 TABLET | Refills: 1 | Status: SHIPPED | OUTPATIENT
Start: 2023-02-14 | End: 2023-03-03 | Stop reason: SDUPTHER

## 2023-03-02 ENCOUNTER — OFFICE VISIT (OUTPATIENT)
Dept: ORTHOPEDIC SURGERY | Facility: CLINIC | Age: 63
End: 2023-03-02
Payer: COMMERCIAL

## 2023-03-02 VITALS — HEIGHT: 71 IN | WEIGHT: 206.5 LBS | BODY MASS INDEX: 28.91 KG/M2

## 2023-03-02 DIAGNOSIS — M75.111 PARTIAL NONTRAUMATIC TEAR OF ROTATOR CUFF, RIGHT: ICD-10-CM

## 2023-03-02 DIAGNOSIS — M19.011: ICD-10-CM

## 2023-03-02 DIAGNOSIS — G89.29 CHRONIC RIGHT SHOULDER PAIN: Primary | ICD-10-CM

## 2023-03-02 DIAGNOSIS — M25.511 CHRONIC RIGHT SHOULDER PAIN: Primary | ICD-10-CM

## 2023-03-02 PROCEDURE — 99213 OFFICE O/P EST LOW 20 MIN: CPT | Performed by: ORTHOPAEDIC SURGERY

## 2023-03-02 NOTE — PROGRESS NOTES
"Ayan Shipman is a 62 y.o. male returns for     Chief Complaint   Patient presents with   • Right Shoulder - Follow-up       HISTORY OF PRESENT ILLNESS:  Here today for R shoulder pain.  Has pain that wakes him up at night.  Sx improved significantly with intraarticular injection and Mobic  Celebrex did not help  Pain worsens with increased activity  Had MRI that showed significant arthritis and rotator cuff tear  Planning on retiring next year       CONCURRENT MEDICAL HISTORY:    The following portions of the patient's history were reviewed and updated as appropriate: allergies, current medications, past family history, past medical history, past social history, past surgical history and problem list.     ROS  No fevers or chills.  No chest pain or shortness of air.  No GI or  disturbances.    PHYSICAL EXAMINATION:       Ht 180.3 cm (71\")   Wt 93.7 kg (206 lb 8 oz)   BMI 28.80 kg/m²     Physical Exam  Constitutional:       General: He is not in acute distress.     Appearance: Normal appearance.   Pulmonary:      Effort: Pulmonary effort is normal. No respiratory distress.   Neurological:      Mental Status: He is alert and oriented to person, place, and time.         GAIT:     [x]  Normal  []  Antalgic    Assistive device: [x]  None  []  Walker     []  Crutches  []  Cane     []  Wheelchair  []  Stretcher    Right Shoulder Exam     Tenderness   The patient is experiencing tenderness in the acromioclavicular joint and acromion.    Range of Motion   Active abduction: 160   External rotation: 60   Forward flexion: 170   Internal rotation 90 degrees: 60     Muscle Strength   Abduction: 4/5   Supraspinatus: 4/5     Tests   Sr test: positive  Cross arm: positive  Impingement: positive    Other   Erythema: absent  Sensation: normal  Pulse: present                                ASSESSMENT:    Diagnoses and all orders for this visit:    Chronic right shoulder pain    Arthrosis of shoulder, right    Partial " nontraumatic tear of rotator cuff, right          PLAN  R shoulder sx manageable with Mobic and intraarticular injections. Does not currently desire surgery. Due to OA, future surgery would be TSA. MRI showed OA, bicep tear with retraction, bursal sided tear of supraspinatus, and interstitial tear of subscap and infraspinatus.     Continue strengthening and stretching exercises. Continue HEP. Continue activity modifications. Possible future injections if they remain effective.     Activity as tolerated.    Continue motion exercises to maintain as much shoulder motion as possible.    May require repeat MRI when decision is made for surgery to determine best surgical option.    Return if symptoms worsen or fail to improve.    Ayan Mccabe MD

## 2023-03-03 ENCOUNTER — OFFICE VISIT (OUTPATIENT)
Dept: FAMILY MEDICINE CLINIC | Facility: CLINIC | Age: 63
End: 2023-03-03
Payer: COMMERCIAL

## 2023-03-03 VITALS
TEMPERATURE: 98.5 F | WEIGHT: 213 LBS | DIASTOLIC BLOOD PRESSURE: 80 MMHG | OXYGEN SATURATION: 96 % | BODY MASS INDEX: 29.82 KG/M2 | HEART RATE: 69 BPM | SYSTOLIC BLOOD PRESSURE: 124 MMHG | HEIGHT: 71 IN

## 2023-03-03 DIAGNOSIS — R42 POSTURAL DIZZINESS WITH PRESYNCOPE: ICD-10-CM

## 2023-03-03 DIAGNOSIS — R09.81 NASAL CONGESTION: ICD-10-CM

## 2023-03-03 DIAGNOSIS — M19.011 PRIMARY OSTEOARTHRITIS OF RIGHT SHOULDER: ICD-10-CM

## 2023-03-03 DIAGNOSIS — R55 POSTURAL DIZZINESS WITH PRESYNCOPE: ICD-10-CM

## 2023-03-03 DIAGNOSIS — R00.2 HEART PALPITATIONS: Primary | ICD-10-CM

## 2023-03-03 DIAGNOSIS — M75.101 ROTATOR CUFF SYNDROME OF RIGHT SHOULDER: ICD-10-CM

## 2023-03-03 PROCEDURE — 99214 OFFICE O/P EST MOD 30 MIN: CPT | Performed by: INTERNAL MEDICINE

## 2023-03-03 PROCEDURE — 93005 ELECTROCARDIOGRAM TRACING: CPT | Performed by: INTERNAL MEDICINE

## 2023-03-03 RX ORDER — MELOXICAM 15 MG/1
15 TABLET ORAL DAILY
Qty: 30 TABLET | Refills: 5 | Status: SHIPPED | OUTPATIENT
Start: 2023-03-03 | End: 2023-03-22 | Stop reason: SDUPTHER

## 2023-03-03 NOTE — PROGRESS NOTES
Chief Complaint  Dizziness (Presyncope (not actually dizzy) that started 2 days ago and also had an episode yesterday. He denies any vertigo today. He states heart problems run in his family. Denies chest pain except maybe a flutter at times )    Subjective        Ayan Shipman presents to [facility]  History of Present Illness    Ayan has experienced a presyncopal episode twice in the last 2 days.  On both occasions, he was standing talking to someone when he suddenly felt lightheaded as if he could pass out, but did not fall or lose consciousness.  Symptoms resolved after approximately 10 to 15 seconds.  The symptoms did not occur with initial standing, so not classic orthostatic symptoms.  With his second episode today, he experienced sensation of palpitations.  No chest pain.  No seizure symptoms.  No unilateral numbness or weakness.  There is a strong family history of heart disease and he believes one cousin  of sudden cardiac death.  EKG today reveals normal sinus rhythm without ectopy or ischemic changes.  Recommended cardiology consultation.    Dr. Mccabe has seen him regarding the right shoulder issues.  He is improving with range of motion exercises and Mobic.  Celebrex did not help.  He has been told that he will need a total shoulder replacement if/when surgical intervention is necessary, but he is adequately improved currently to avoid surgery.    He has experienced chronic problems with nearly total nasal congestion on the left.  This has been present for several years.  We have referred him to ENT a few years ago, but he was unable to keep the appointment.  He would like to be seen by ENT to address this issue.  He is overusing OTC nasal decongestant spray.    Blood pressure and heart rate are at goal today with without blood pressure medications.      Objective   Vital Signs:  /80   Pulse 69   Temp 98.5 °F (36.9 °C)   Ht 180.3 cm  "(71\")   Wt 96.6 kg (213 lb)   SpO2 96%   BMI 29.71 kg/m²   Estimated body mass index is 29.71 kg/m² as calculated from the following:    Height as of this encounter: 180.3 cm (71\").    Weight as of this encounter: 96.6 kg (213 lb).             Physical Exam  Vitals reviewed.   Constitutional:       General: He is not in acute distress.     Appearance: He is well-developed.      Comments: Very pleasant male, overweight.    HENT:      Head: Normocephalic and atraumatic.      Nose:      Right Sinus: No maxillary sinus tenderness or frontal sinus tenderness.      Left Sinus: No maxillary sinus tenderness or frontal sinus tenderness.      Mouth/Throat:      Mouth: No oral lesions.      Pharynx: Uvula midline.      Tonsils: No tonsillar exudate.   Eyes:      Conjunctiva/sclera: Conjunctivae normal.      Pupils: Pupils are equal, round, and reactive to light.      Comments:       Neck:      Thyroid: No thyroid mass or thyromegaly.      Vascular: No carotid bruit or JVD.      Trachea: Trachea normal. No tracheal deviation.   Cardiovascular:      Rate and Rhythm: Normal rate and regular rhythm.  No extrasystoles are present.     Chest Wall: PMI is not displaced.      Heart sounds: Normal heart sounds. No murmur heard.     Comments: No ectopy noted  Pulmonary:      Effort: Pulmonary effort is normal. No accessory muscle usage or respiratory distress.      Breath sounds: Normal breath sounds. No decreased breath sounds, wheezing, rhonchi or rales.   Abdominal:      General: There is no distension.      Palpations: Abdomen is soft.   Musculoskeletal:      Cervical back: Neck supple.   Lymphadenopathy:      Cervical: No cervical adenopathy.   Skin:     General: Skin is warm and dry.      Findings: No rash.      Nails: There is no clubbing.   Neurological:      General: No focal deficit present.      Mental Status: He is alert and oriented to person, place, and time. Mental status is at baseline.      Cranial Nerves: No " cranial nerve deficit.      Motor: No weakness.      Coordination: Coordination normal.      Gait: Gait normal.   Psychiatric:         Mood and Affect: Mood normal.         Speech: Speech normal.         Behavior: Behavior normal.         Thought Content: Thought content normal.         Judgment: Judgment normal.        Result Review :        Data reviewed: Consultant notes Dr. Mccabe        ECG 12 Lead    Date/Time: 3/3/2023 2:00 PM  Performed by: Mo Marques MD  Authorized by: Mo Marques MD   Previous ECG: no previous ECG available  Rhythm: sinus rhythm  Rate: normal  ST Segments: ST segments normal  T Waves: T waves normal  QRS axis: normal  Other: no other findings    Clinical impression: normal ECG  Comments: Normal/unremarkable EKG with motion artifact              Assessment and Plan   Diagnoses and all orders for this visit:    1. Heart palpitations (Primary)  -     Ambulatory Referral to Cardiology  -     ECG 12 Lead  -     Mobile Cardiac Outpatient Telemetry; Future    2. Postural dizziness with presyncope  -     Ambulatory Referral to Cardiology  -     ECG 12 Lead  -     Mobile Cardiac Outpatient Telemetry; Future    3. Nasal congestion  -     Ambulatory Referral to ENT (Otolaryngology)    4. Rotator cuff syndrome of right shoulder    5. Primary osteoarthritis of right shoulder    Other orders  -     meloxicam (MOBIC) 15 MG tablet; Take 1 tablet by mouth Daily.  Dispense: 30 tablet; Refill: 5    The presyncopal episodes and palpitations that accompany the second episode suggest cardiac etiology.  Arrange 7-day mobile cardiac telemetry testing.  EKG today is unremarkable.  Go to the ER this weekend should symptoms worsen.  Fall precautions.  Refer to cardiology to evaluate this issue.    Refer to Dr. Boyce to evaluate the chronic left nasal congestion.    Continue to rehab the right shoulder.  He has been taking Mobic daily and his symptoms are rather well controlled now.  I suggested now using the  Mobic episodically instead of daily.  Use Tylenol for lesser pains.  Dr. Mccabe has told him he will be in need of a shoulder replacement if/when symptoms worsen, but he is currently managing relatively well.  Continue to try to avoid activities that aggravate the right shoulder pain.       I spent 33 minutes caring for Ayan on this date of service. This time includes time spent by me in the following activities:preparing for the visit, reviewing tests, obtaining and/or reviewing a separately obtained history, performing a medically appropriate examination and/or evaluation , counseling and educating the patient/family/caregiver, ordering medications, tests, or procedures, referring and communicating with other health care professionals  and documenting information in the medical record  Follow Up   Return if symptoms worsen or fail to improve, for Next scheduled follow up - labs 1 week prior.  Patient was given instructions and counseling regarding his condition or for health maintenance advice. Please see specific information pulled into the AVS if appropriate.

## 2023-03-06 DIAGNOSIS — R00.2 PALPITATIONS: Primary | ICD-10-CM

## 2023-03-14 ENCOUNTER — LAB (OUTPATIENT)
Dept: LAB | Facility: OTHER | Age: 63
End: 2023-03-14
Payer: COMMERCIAL

## 2023-03-14 ENCOUNTER — HOSPITAL ENCOUNTER (INPATIENT)
Facility: HOSPITAL | Age: 63
LOS: 3 days | Discharge: HOME | DRG: 244 | End: 2023-03-17
Attending: EMERGENCY MEDICINE | Admitting: INTERNAL MEDICINE
Payer: COMMERCIAL

## 2023-03-14 ENCOUNTER — TELEPHONE (OUTPATIENT)
Dept: CARDIOLOGY | Facility: CLINIC | Age: 63
End: 2023-03-14
Payer: COMMERCIAL

## 2023-03-14 DIAGNOSIS — Z00.00 ROUTINE GENERAL MEDICAL EXAMINATION AT A HEALTH CARE FACILITY: ICD-10-CM

## 2023-03-14 DIAGNOSIS — I44.1 AV BLOCK, MOBITZ II: ICD-10-CM

## 2023-03-14 DIAGNOSIS — R55 NEAR SYNCOPE: ICD-10-CM

## 2023-03-14 DIAGNOSIS — R73.01 IMPAIRED FASTING GLUCOSE: Chronic | ICD-10-CM

## 2023-03-14 DIAGNOSIS — Z12.5 SPECIAL SCREENING FOR MALIGNANT NEOPLASM OF PROSTATE: ICD-10-CM

## 2023-03-14 DIAGNOSIS — I45.5 SINUS PAUSE: Primary | ICD-10-CM

## 2023-03-14 DIAGNOSIS — I71.21 ANEURYSM OF ASCENDING AORTA WITHOUT RUPTURE: ICD-10-CM

## 2023-03-14 LAB
ALBUMIN SERPL-MCNC: 4.3 G/DL (ref 3.5–5)
ALBUMIN/GLOB SERPL: 1.4 G/DL (ref 1.1–1.8)
ALP SERPL-CCNC: 39 U/L (ref 38–126)
ALT SERPL W P-5'-P-CCNC: 26 U/L
ANION GAP SERPL CALCULATED.3IONS-SCNC: 6 MMOL/L (ref 5–15)
AST SERPL-CCNC: 27 U/L (ref 17–59)
BASOPHILS # BLD AUTO: 0.06 10*3/MM3 (ref 0–0.2)
BASOPHILS NFR BLD AUTO: 1 % (ref 0–1.5)
BILIRUB SERPL-MCNC: 0.7 MG/DL (ref 0.2–1.3)
BUN SERPL-MCNC: 15 MG/DL (ref 7–23)
BUN/CREAT SERPL: 15 (ref 7–25)
CALCIUM SPEC-SCNC: 9 MG/DL (ref 8.4–10.2)
CHLORIDE SERPL-SCNC: 104 MMOL/L (ref 101–112)
CHOLEST SERPL-MCNC: 195 MG/DL (ref 150–200)
CO2 SERPL-SCNC: 31 MMOL/L (ref 22–30)
CREAT SERPL-MCNC: 1 MG/DL (ref 0.7–1.3)
DEPRECATED RDW RBC AUTO: 45.2 FL (ref 37–54)
EGFRCR SERPLBLD CKD-EPI 2021: 84 ML/MIN/1.73
EOSINOPHIL # BLD AUTO: 0.19 10*3/MM3 (ref 0–0.4)
EOSINOPHIL NFR BLD AUTO: 3.2 % (ref 0.3–6.2)
ERYTHROCYTE [DISTWIDTH] IN BLOOD BY AUTOMATED COUNT: 13.3 % (ref 12.3–15.4)
GLOBULIN UR ELPH-MCNC: 3 GM/DL (ref 2.3–3.5)
GLUCOSE SERPL-MCNC: 114 MG/DL (ref 70–99)
HBA1C MFR BLD: 5.3 % (ref 4.8–5.6)
HCT VFR BLD AUTO: 44.3 % (ref 37.5–51)
HDLC SERPL-MCNC: 49 MG/DL (ref 40–59)
HGB BLD-MCNC: 15.2 G/DL (ref 13–17.7)
LDLC SERPL CALC-MCNC: 128 MG/DL
LDLC/HDLC SERPL: 2.58 {RATIO} (ref 0–3.55)
LYMPHOCYTES # BLD AUTO: 1.94 10*3/MM3 (ref 0.7–3.1)
LYMPHOCYTES NFR BLD AUTO: 33.2 % (ref 19.6–45.3)
MCH RBC QN AUTO: 32.8 PG (ref 26.6–33)
MCHC RBC AUTO-ENTMCNC: 34.3 G/DL (ref 31.5–35.7)
MCV RBC AUTO: 95.5 FL (ref 79–97)
MONOCYTES # BLD AUTO: 0.58 10*3/MM3 (ref 0.1–0.9)
MONOCYTES NFR BLD AUTO: 9.9 % (ref 5–12)
NEUTROPHILS NFR BLD AUTO: 3.08 10*3/MM3 (ref 1.7–7)
NEUTROPHILS NFR BLD AUTO: 52.7 % (ref 42.7–76)
PLATELET # BLD AUTO: 206 10*3/MM3 (ref 140–450)
PMV BLD AUTO: 9.2 FL (ref 6–12)
POTASSIUM SERPL-SCNC: 4.1 MMOL/L (ref 3.4–5)
PROT SERPL-MCNC: 7.3 G/DL (ref 6.3–8.6)
PSA SERPL-MCNC: 1.29 NG/ML (ref 0–4)
RBC # BLD AUTO: 4.64 10*6/MM3 (ref 4.14–5.8)
SODIUM SERPL-SCNC: 141 MMOL/L (ref 137–145)
TRIGL SERPL-MCNC: 98 MG/DL
TSH SERPL DL<=0.05 MIU/L-ACNC: 3.35 UIU/ML (ref 0.27–4.2)
VLDLC SERPL-MCNC: 18 MG/DL (ref 5–40)
WBC NRBC COR # BLD: 5.85 10*3/MM3 (ref 3.4–10.8)

## 2023-03-14 PROCEDURE — 80061 LIPID PANEL: CPT | Performed by: INTERNAL MEDICINE

## 2023-03-14 PROCEDURE — 99222 1ST HOSP IP/OBS MODERATE 55: CPT | Performed by: CHIROPRACTOR

## 2023-03-14 PROCEDURE — G0103 PSA SCREENING: HCPCS | Performed by: INTERNAL MEDICINE

## 2023-03-14 PROCEDURE — 93010 ELECTROCARDIOGRAM REPORT: CPT | Performed by: INTERNAL MEDICINE

## 2023-03-14 PROCEDURE — 93005 ELECTROCARDIOGRAM TRACING: CPT

## 2023-03-14 PROCEDURE — 93005 ELECTROCARDIOGRAM TRACING: CPT | Performed by: EMERGENCY MEDICINE

## 2023-03-14 PROCEDURE — 36415 COLL VENOUS BLD VENIPUNCTURE: CPT | Performed by: INTERNAL MEDICINE

## 2023-03-14 PROCEDURE — 99284 EMERGENCY DEPT VISIT MOD MDM: CPT

## 2023-03-14 PROCEDURE — 83036 HEMOGLOBIN GLYCOSYLATED A1C: CPT | Performed by: INTERNAL MEDICINE

## 2023-03-14 PROCEDURE — 80050 GENERAL HEALTH PANEL: CPT | Performed by: INTERNAL MEDICINE

## 2023-03-14 RX ORDER — SODIUM CHLORIDE 0.9 % (FLUSH) 0.9 %
10 SYRINGE (ML) INJECTION AS NEEDED
Status: DISCONTINUED | OUTPATIENT
Start: 2023-03-14 | End: 2023-03-17 | Stop reason: HOSPADM

## 2023-03-14 RX ORDER — ATROPINE SULFATE 0.4 MG/ML
0.4 AMPUL (ML) INJECTION AS NEEDED
Status: DISCONTINUED | OUTPATIENT
Start: 2023-03-14 | End: 2023-03-17 | Stop reason: HOSPADM

## 2023-03-14 NOTE — TELEPHONE ENCOUNTER
Dr. Marques called regarding patient having upcoming visit for syncope.  Holter monitor was placed at [facility].  Patient had event on 3-13-23 at 12:00 PM with sinus pause with ventricular escape beat at 3.6 secs and another at 3.4 secs. Patient called and recommended to go to our ER for further evaluation and admission to possibly consider pacemaker.             This document has been electronically signed by ELLY Koch on March 14, 2023 14:47 CDT

## 2023-03-14 NOTE — H&P
HISTORY AND PHYSICAL  NAME: Ayan Shipman  : 1960  MRN: [id number]    DATE OF ADMISSION:  3/14/2023     DATE & TIME SEEN: 23 at 1830    PCP: Mo Marques MD    CODE STATUS: Full code    CHIEF COMPLAINT: Near syncope    HPI:  Ayan Shipman is a 62 y.o. male with a CMH of osteoarthritis who presents complaining of near syncopal episodes.  He reports that for the past few years he has been feeling like his heart will occasionally skip beats.  Up until this year the problem was very intermittent and did not appear to bother him.  About 1 month ago during 1 of these episodes of his heart skipping a beat he felt lightheadedness and felt like he was going to pass out although he did not.  He had a second episode again in early March.  Again without episode he felt the missed beat, felt lightheaded, took a couple of steps backwards, and felt like he was going to pass out although he did not.  He subsequently saw his PCP who referred him to cardiology.  He had a Holter monitor placed.  He got a call yesterday from the monitoring company advising him that he was experiencing 3 to 4-second episodes of sinus pause.  They recommended that he come to the ER for evaluation and consideration of pacemaker.    This is an otherwise healthy male who takes no prescribed medications on a consistent basis.    His family history is significant for his mother who needed a pacemaker.    CONCURRENT MEDICAL HISTORY:  Past Medical History:   Diagnosis Date   • Class 1 obesity due to excess calories with serious comorbidity and body mass index (BMI) of 30.0 to 30.9 in adult 3/8/2021   • Erectile dysfunction 2019   • GERD (gastroesophageal reflux disease)    • Hammertoes of both feet 3/8/2021   • Hemorrhoids    • Hypercholesterolemia    • Impaired fasting glucose 3/8/2021   • Impotence    • Overweight (BMI 25.0-29.9) 2019   • Palpitations    • Primary osteoarthritis involving multiple joints 10/8/2021   •  Primary osteoarthritis of both knees 2/26/2019   • Shoulder pain     Sprain of shoulder and upper arm - right; improved          PAST SURGICAL HISTORY:  Past Surgical History:   Procedure Laterality Date   • COLONOSCOPY N/A 7/2/2021    Procedure: COLONOSCOPY;  Surgeon: Kyle Thakur DO;  Location: [facility] ENDOSCOPY;  Service: Gastroenterology;  Laterality: N/A;   • ENDOSCOPY AND COLONOSCOPY  03/17/2010    Internal grade I hemorrhoids found in the anus   • HERNIA REPAIR     • INJECTION OF MEDICATION  07/08/2013    Kenalog (3)      • KNEE SURGERY         FAMILY HISTORY:  Family History   Problem Relation Age of Onset   • Diabetes Other    • Heart disease Other    • Hypertension Other    • Arthritis Other    • Stroke Mother    • Arthritis Mother    • Hyperlipidemia Mother    • ALS Father    • Early death Father         ALS   • Diabetes Brother    • Heart disease Brother    • Cancer Brother    • Diabetes Brother    • Heart disease Brother         SOCIAL HISTORY:  Social History     Socioeconomic History   • Marital status:    Tobacco Use   • Smoking status: Never     Passive exposure: Never   • Smokeless tobacco: Never   Substance and Sexual Activity   • Alcohol use: No   • Drug use: No   • Sexual activity: Not Currently       HOME MEDICATIONS:  Prior to Admission medications    Medication Sig Start Date End Date Taking? Authorizing Provider   acetaminophen (TYLENOL) 500 MG tablet Take 1 tablet by mouth Every 6 (Six) Hours As Needed for Mild Pain.   Yes Alton Mcmillan MD   Ascorbic Acid (VITAMIN C PO) Take  by mouth.   Yes Alton Mcmillan MD   meloxicam (MOBIC) 15 MG tablet Take 1 tablet by mouth Daily. 3/3/23  Yes Mo Marques MD   Nutritional Supplements (VITAMIN D BOOSTER PO) Take  by mouth.   Yes Alton Mcmillan MD       ALLERGIES:  Patient has no known allergies.    REVIEW OF SYSTEMS  Review of Systems   Constitutional: Negative for appetite change, chills, diaphoresis, fatigue,  fever and unexpected weight change.   HENT: Positive for congestion. Negative for dental problem, ear discharge, ear pain, hearing loss, mouth sores, rhinorrhea, sinus pressure, sinus pain, sore throat and trouble swallowing.    Eyes: Negative for pain, discharge, redness and visual disturbance.   Respiratory: Negative for cough, chest tightness, shortness of breath and wheezing.    Cardiovascular: Positive for palpitations and leg swelling. Negative for chest pain.   Gastrointestinal: Negative for abdominal pain, blood in stool, constipation, diarrhea, nausea and vomiting.   Endocrine: Negative for cold intolerance and heat intolerance.   Genitourinary: Negative for difficulty urinating, dysuria and hematuria.   Musculoskeletal: Positive for arthralgias and gait problem. Negative for back pain and joint swelling.   Skin: Negative for color change.   Neurological: Positive for light-headedness. Negative for dizziness, tremors, seizures, weakness, numbness and headaches.   Hematological: Negative for adenopathy.   Psychiatric/Behavioral: Negative for behavioral problems, confusion, hallucinations and sleep disturbance.       PHYSICAL EXAM:  Temp:  [97.7 °F (36.5 °C)-98 °F (36.7 °C)] 97.8 °F (36.6 °C)  Heart Rate:  [71] 71  Resp:  [16-22] 18  BP: (170)/(89) 170/89  Body mass index is 28.91 kg/m².  Physical Exam  Vitals reviewed.   Constitutional:       Appearance: He is normal weight. He is not ill-appearing or diaphoretic.   HENT:      Head: Atraumatic.      Mouth/Throat:      Mouth: Mucous membranes are moist.   Eyes:      General: No scleral icterus.  Neck:      Vascular: No carotid bruit.   Cardiovascular:      Rate and Rhythm: Normal rate and regular rhythm.      Pulses: Normal pulses.      Heart sounds: No murmur heard.    No gallop.   Pulmonary:      Effort: No respiratory distress.      Breath sounds: No wheezing.   Abdominal:      General: Bowel sounds are normal.      Palpations: There is no mass.       Tenderness: There is no abdominal tenderness.   Musculoskeletal:      Right lower leg: No edema.      Left lower leg: No edema.   Lymphadenopathy:      Cervical: No cervical adenopathy.   Skin:     Capillary Refill: Capillary refill takes less than 2 seconds.      Findings: No lesion or rash.   Neurological:      General: No focal deficit present.      Mental Status: He is alert.      Sensory: No sensory deficit.      Motor: No weakness.   Psychiatric:         Mood and Affect: Mood normal.         Thought Content: Thought content normal.         Judgment: Judgment normal.         DIAGNOSTIC DATA:   Lab Results (last 24 hours)     Procedure Component Value Units Date/Time    Comprehensive Metabolic Panel [[id number]]  (Abnormal) Collected: 03/15/23 0329    Specimen: Blood Updated: 03/15/23 0429     Glucose 112 mg/dL      BUN 14 mg/dL      Creatinine 0.99 mg/dL      Sodium 141 mmol/L      Potassium 3.9 mmol/L      Comment: Slight hemolysis detected by analyzer. Results may be affected.        Chloride 107 mmol/L      CO2 26.0 mmol/L      Calcium 8.6 mg/dL      Total Protein 6.4 g/dL      Albumin 4.0 g/dL      ALT (SGPT) 18 U/L      AST (SGOT) 20 U/L      Alkaline Phosphatase 40 U/L      Total Bilirubin 0.3 mg/dL      Globulin 2.4 gm/dL      A/G Ratio 1.7 g/dL      BUN/Creatinine Ratio 14.1     Anion Gap 8.0 mmol/L      eGFR 85.1 mL/min/1.73     Narrative:      GFR Normal >60  Chronic Kidney Disease <60  Kidney Failure <15      CBC & Differential [[id number]]  (Normal) Collected: 03/15/23 0329    Specimen: Blood Updated: 03/15/23 0351    Narrative:      The following orders were created for panel order CBC & Differential.  Procedure                               Abnormality         Status                     ---------                               -----------         ------                     CBC Auto Differential[[id number]]        Normal              Final result                 Please view results for these tests on  the individual orders.    CBC Auto Differential [[id number]]  (Normal) Collected: 03/15/23 0329    Specimen: Blood Updated: 03/15/23 0351     WBC 6.54 10*3/mm3      RBC 4.65 10*6/mm3      Hemoglobin 14.8 g/dL      Hematocrit 43.5 %      MCV 93.5 fL      MCH 31.8 pg      MCHC 34.0 g/dL      RDW 13.2 %      RDW-SD 45.1 fl      MPV 9.2 fL      Platelets 212 10*3/mm3      Neutrophil % 54.2 %      Lymphocyte % 32.6 %      Monocyte % 8.6 %      Eosinophil % 3.5 %      Basophil % 0.8 %      Immature Grans % 0.3 %      Neutrophils, Absolute 3.55 10*3/mm3      Lymphocytes, Absolute 2.13 10*3/mm3      Monocytes, Absolute 0.56 10*3/mm3      Eosinophils, Absolute 0.23 10*3/mm3      Basophils, Absolute 0.05 10*3/mm3      Immature Grans, Absolute 0.02 10*3/mm3      nRBC 0.0 /100 WBC            Imaging Results (Last 24 Hours)     ** No results found for the last 24 hours. **            I reviewed the patient's new clinical results.    ASSESSMENT AND PLAN: This is a 62 y.o. male with a several year history of feeling like his heart has been skipping beats however no other symptoms until about 1 month ago when the skipped beat sensation began to be associated with some lightheadedness and a feeling like he was going to pass out.  Has had a Holter monitor on.  Monitoring company called due to sinus pauses and advised him to come to ER.  Dr. Serrano consulted recommend patient be admitted to stepdown.  Pacer pads have been applied to patient's chest.  Atropine is in room.  ZOLL is in room.    Active Hospital Problems    Diagnosis  POA   • **Sinus pause [I45.5]  Yes     Sinus pause  - Continuous cardiac monitoring  - Admit to stepdown unit  - Pacer pads in place with pacemaker device in room.  - Atropine 0.4 mg 2 doses in room with patient.  - N.p.o. after midnight for possible pacemaker implantation  - Cardiology consulted Dr. Guzman on board.    DVT prophylaxis: SCDs/TEDs     Ayan Shipman and I have discussed pain goals for this  hospitalization after reviewing his current clinical condition, medical history and prior pain experiences.  The goal is to keep the pain level mild  .  To help achieve this, I plan to use Tylenol as needed.    PDMP reviewed and consistent with patient reported medications.    Expected Length of Stay: 2 days    I discussed the patient's findings and my recommendations with patient and family.     Eduard Miller MD is the attending on record at time of admission, She is aware of the patient's status and agrees with the above history and physical.        This document has been electronically signed by Ayan Lopez MD on March 15, 2023 04:54 CDT

## 2023-03-14 NOTE — ED PROVIDER NOTES
Subjective   History of Present Illness  This is a 62-year-old male who presents for near syncope.  The patient has been having episodes for about 1 to 2 weeks.  He was seen for this issue by his family doctor and placed on a 7-day Holter monitor which had abnormal findings yesterday including a couple of 3 to 4-second sinus pauses.  He received an emergent phone call from the interpreting cardiology service who recommended that he go to the emergency department for evaluation and consideration of a pacemaker.  The patient denies any recent changes to his home medications.  He denies chest pain or shortness of breath.  He does occasionally experience episodes of lightheadedness sometimes with associated palpitations.  He denies any complete syncopal episodes.  He has a family history of a mother who required a pacemaker as well.        Review of Systems   All other systems reviewed and are negative.      Past Medical History:   Diagnosis Date   • Class 1 obesity due to excess calories with serious comorbidity and body mass index (BMI) of 30.0 to 30.9 in adult 3/8/2021   • Erectile dysfunction 2/26/2019   • GERD (gastroesophageal reflux disease)    • Hammertoes of both feet 3/8/2021   • Hemorrhoids    • Hypercholesterolemia    • Impaired fasting glucose 3/8/2021   • Impotence    • Overweight (BMI 25.0-29.9) 2/26/2019   • Palpitations    • Primary osteoarthritis involving multiple joints 10/8/2021   • Primary osteoarthritis of both knees 2/26/2019   • Shoulder pain     Sprain of shoulder and upper arm - right; improved          No Known Allergies    Past Surgical History:   Procedure Laterality Date   • COLONOSCOPY N/A 7/2/2021    Procedure: COLONOSCOPY;  Surgeon: Kyle Thakur DO;  Location: [facility] ENDOSCOPY;  Service: Gastroenterology;  Laterality: N/A;   • ENDOSCOPY AND COLONOSCOPY  03/17/2010    Internal grade I hemorrhoids found in the anus   • HERNIA REPAIR     • INJECTION OF MEDICATION  07/08/2013     "Kenalog (3)      • KNEE SURGERY         Family History   Problem Relation Age of Onset   • Diabetes Other    • Heart disease Other    • Hypertension Other    • Arthritis Other    • Stroke Mother    • Arthritis Mother    • Hyperlipidemia Mother    • ALS Father    • Early death Father         ALS   • Diabetes Brother    • Heart disease Brother    • Cancer Brother    • Diabetes Brother    • Heart disease Brother        Social History     Socioeconomic History   • Marital status:    Tobacco Use   • Smoking status: Never     Passive exposure: Never   • Smokeless tobacco: Never   Substance and Sexual Activity   • Alcohol use: No   • Drug use: No   • Sexual activity: Not Currently           Objective   Physical Exam  Vitals and nursing note reviewed.   Constitutional:       Appearance: He is not ill-appearing.   HENT:      Head: Normocephalic and atraumatic.      Nose: Nose normal.   Eyes:      General: No scleral icterus.  Cardiovascular:      Rate and Rhythm: Normal rate and regular rhythm.      Heart sounds: Murmur heard.   Pulmonary:      Effort: Pulmonary effort is normal.      Breath sounds: Normal breath sounds.   Abdominal:      General: Abdomen is flat.      Tenderness: There is no abdominal tenderness.   Musculoskeletal:         General: No signs of injury.   Skin:     General: Skin is warm and dry.   Neurological:      Mental Status: He is alert and oriented to person, place, and time.   Psychiatric:         Behavior: Behavior normal.         Procedures           ED Course                                           Medical Decision Making  The patient's recent past medical charts for this facility as well as outside facilities via the \"care everywhere\" application of epic reviewed.  Patient was recently seen by primary care for near syncopal episodes of palpitations and was set up for a cardiac Holter monitor.  Today the patient received a phone call from cardiology recommending he come to the emergency " "department for admission.    \"Dr. Marques called regarding patient having upcoming visit for syncope.  Holter monitor was placed at [facility].  Patient had event on 3-13-23 at 12:00 PM with sinus pause with ventricular escape beat at 3.6 secs and another at 3.4 secs. Patient called and recommended to go to our ER for further evaluation and admission to possibly consider pacemaker.\"    The differential diagnosis includes cardiac dysrhythmia, anemia, dehydration, and medication side effect, among others.        Near syncope: acute illness or injury  Sinus pause: acute illness or injury  Amount and/or Complexity of Data Reviewed  Independent Historian: spouse     Details: Provided additional history regarding the patient's past medical problems.  Labs: ordered. Decision-making details documented in ED Course.  ECG/medicine tests: ordered and independent interpretation performed.     Details: EKG interpretation: Interpreted myself.  Normal sinus rhythm.  Rate 71.  Normal P wave and NV interval.  Normal QRS and axis.  Normal QTc interval.  ST segments are normal.  Discussion of management or test interpretation with external provider(s): Case discussed with admitting family practice residency service.    Risk  OTC drugs.  Prescription drug management.  Decision regarding hospitalization.          Final diagnoses:   Sinus pause   Near syncope       ED Disposition  ED Disposition     ED Disposition   Decision to Admit    Condition   --    Comment   Level of Care: Stepdown [25]   Diagnosis: Sinus pause [[id number]]   Admitting Physician: RASHI VARGAS [[id number]]   Attending Physician: RASHI VARGAS [[id number]]   Certification: I Certify That Inpatient Hospital Services Are Medically Necessary For Greater Than 2 Midnights               No follow-up provider specified.       Medication List      No changes were made to your prescriptions during this visit.          Remigio Cuevas DO  03/14/23 2620    "

## 2023-03-15 ENCOUNTER — APPOINTMENT (OUTPATIENT)
Dept: CT IMAGING | Facility: HOSPITAL | Age: 63
DRG: 244 | End: 2023-03-15
Payer: COMMERCIAL

## 2023-03-15 ENCOUNTER — PREP FOR SURGERY (OUTPATIENT)
Dept: OTHER | Facility: HOSPITAL | Age: 63
End: 2023-03-15
Payer: COMMERCIAL

## 2023-03-15 ENCOUNTER — APPOINTMENT (OUTPATIENT)
Dept: CARDIOLOGY | Facility: HOSPITAL | Age: 63
DRG: 244 | End: 2023-03-15
Payer: COMMERCIAL

## 2023-03-15 DIAGNOSIS — I44.1 AV BLOCK, MOBITZ II: Primary | ICD-10-CM

## 2023-03-15 LAB
ALBUMIN SERPL-MCNC: 4 G/DL (ref 3.5–5.2)
ALBUMIN/GLOB SERPL: 1.7 G/DL
ALP SERPL-CCNC: 40 U/L (ref 39–117)
ALT SERPL W P-5'-P-CCNC: 18 U/L (ref 1–41)
ANION GAP SERPL CALCULATED.3IONS-SCNC: 8 MMOL/L (ref 5–15)
AST SERPL-CCNC: 20 U/L (ref 1–40)
BASOPHILS # BLD AUTO: 0.05 10*3/MM3 (ref 0–0.2)
BASOPHILS NFR BLD AUTO: 0.8 % (ref 0–1.5)
BH CV ECHO MEAS - ACS: 1.78 CM
BH CV ECHO MEAS - AO MAX PG: 13.5 MMHG
BH CV ECHO MEAS - AO MEAN PG: 7.3 MMHG
BH CV ECHO MEAS - AO ROOT DIAM: 4.2 CM
BH CV ECHO MEAS - AO V2 MAX: 183.9 CM/SEC
BH CV ECHO MEAS - AO V2 VTI: 36 CM
BH CV ECHO MEAS - AVA(I,D): 2.34 CM2
BH CV ECHO MEAS - EDV(CUBED): 141.6 ML
BH CV ECHO MEAS - EDV(MOD-SP2): 79.7 ML
BH CV ECHO MEAS - EDV(MOD-SP4): 85.3 ML
BH CV ECHO MEAS - EF(MOD-BP): 65.9 %
BH CV ECHO MEAS - EF(MOD-SP2): 68.5 %
BH CV ECHO MEAS - EF(MOD-SP4): 64.8 %
BH CV ECHO MEAS - ESV(CUBED): 39.4 ML
BH CV ECHO MEAS - ESV(MOD-SP2): 25.1 ML
BH CV ECHO MEAS - ESV(MOD-SP4): 30 ML
BH CV ECHO MEAS - FS: 34.7 %
BH CV ECHO MEAS - IVS/LVPW: 1.15 CM
BH CV ECHO MEAS - IVSD: 1.09 CM
BH CV ECHO MEAS - LA DIMENSION: 3.1 CM
BH CV ECHO MEAS - LAT PEAK E' VEL: 12.8 CM/SEC
BH CV ECHO MEAS - LV DIASTOLIC VOL/BSA (35-75): 40 CM2
BH CV ECHO MEAS - LV MASS(C)D: 200.1 GRAMS
BH CV ECHO MEAS - LV MAX PG: 2.8 MMHG
BH CV ECHO MEAS - LV MEAN PG: 1.57 MMHG
BH CV ECHO MEAS - LV SYSTOLIC VOL/BSA (12-30): 14.1 CM2
BH CV ECHO MEAS - LV V1 MAX: 83.6 CM/SEC
BH CV ECHO MEAS - LV V1 VTI: 16.4 CM
BH CV ECHO MEAS - LVIDD: 5.2 CM
BH CV ECHO MEAS - LVIDS: 3.4 CM
BH CV ECHO MEAS - LVOT AREA: 5.1 CM2
BH CV ECHO MEAS - LVOT DIAM: 2.6 CM
BH CV ECHO MEAS - LVPWD: 0.95 CM
BH CV ECHO MEAS - MED PEAK E' VEL: 7.6 CM/SEC
BH CV ECHO MEAS - MV A MAX VEL: 58.3 CM/SEC
BH CV ECHO MEAS - MV DEC SLOPE: 386.9 CM/SEC2
BH CV ECHO MEAS - MV E MAX VEL: 63.8 CM/SEC
BH CV ECHO MEAS - MV E/A: 1.09
BH CV ECHO MEAS - MV MAX PG: 4.1 MMHG
BH CV ECHO MEAS - MV MEAN PG: 1.13 MMHG
BH CV ECHO MEAS - MV P1/2T: 57.6 MSEC
BH CV ECHO MEAS - MV V2 VTI: 21.9 CM
BH CV ECHO MEAS - MVA(P1/2T): 3.8 CM2
BH CV ECHO MEAS - MVA(VTI): 3.9 CM2
BH CV ECHO MEAS - PA V2 MAX: 115.4 CM/SEC
BH CV ECHO MEAS - RAP SYSTOLE: 3 MMHG
BH CV ECHO MEAS - RVDD: 2.7 CM
BH CV ECHO MEAS - RVSP: 25.9 MMHG
BH CV ECHO MEAS - SI(MOD-SP2): 25.6 ML/M2
BH CV ECHO MEAS - SI(MOD-SP4): 25.9 ML/M2
BH CV ECHO MEAS - SV(LVOT): 84.2 ML
BH CV ECHO MEAS - SV(MOD-SP2): 54.6 ML
BH CV ECHO MEAS - SV(MOD-SP4): 55.3 ML
BH CV ECHO MEAS - TR MAX PG: 22.9 MMHG
BH CV ECHO MEAS - TR MAX VEL: 239.2 CM/SEC
BH CV ECHO MEASUREMENTS AVERAGE E/E' RATIO: 6.25
BILIRUB SERPL-MCNC: 0.3 MG/DL (ref 0–1.2)
BUN SERPL-MCNC: 14 MG/DL (ref 8–23)
BUN/CREAT SERPL: 14.1 (ref 7–25)
CALCIUM SPEC-SCNC: 8.6 MG/DL (ref 8.6–10.5)
CHLORIDE SERPL-SCNC: 107 MMOL/L (ref 98–107)
CO2 SERPL-SCNC: 26 MMOL/L (ref 22–29)
CREAT SERPL-MCNC: 0.99 MG/DL (ref 0.76–1.27)
DEPRECATED RDW RBC AUTO: 45.1 FL (ref 37–54)
EGFRCR SERPLBLD CKD-EPI 2021: 85.1 ML/MIN/1.73
EOSINOPHIL # BLD AUTO: 0.23 10*3/MM3 (ref 0–0.4)
EOSINOPHIL NFR BLD AUTO: 3.5 % (ref 0.3–6.2)
ERYTHROCYTE [DISTWIDTH] IN BLOOD BY AUTOMATED COUNT: 13.2 % (ref 12.3–15.4)
GLOBULIN UR ELPH-MCNC: 2.4 GM/DL
GLUCOSE SERPL-MCNC: 112 MG/DL (ref 65–99)
HCT VFR BLD AUTO: 43.5 % (ref 37.5–51)
HGB BLD-MCNC: 14.8 G/DL (ref 13–17.7)
IMM GRANULOCYTES # BLD AUTO: 0.02 10*3/MM3 (ref 0–0.05)
IMM GRANULOCYTES NFR BLD AUTO: 0.3 % (ref 0–0.5)
LEFT ATRIUM VOLUME INDEX: 13 ML/M2
LYMPHOCYTES # BLD AUTO: 2.13 10*3/MM3 (ref 0.7–3.1)
LYMPHOCYTES NFR BLD AUTO: 32.6 % (ref 19.6–45.3)
MAXIMAL PREDICTED HEART RATE: 156 BPM
MCH RBC QN AUTO: 31.8 PG (ref 26.6–33)
MCHC RBC AUTO-ENTMCNC: 34 G/DL (ref 31.5–35.7)
MCV RBC AUTO: 93.5 FL (ref 79–97)
MONOCYTES # BLD AUTO: 0.56 10*3/MM3 (ref 0.1–0.9)
MONOCYTES NFR BLD AUTO: 8.6 % (ref 5–12)
NEUTROPHILS NFR BLD AUTO: 3.55 10*3/MM3 (ref 1.7–7)
NEUTROPHILS NFR BLD AUTO: 54.2 % (ref 42.7–76)
NRBC BLD AUTO-RTO: 0 /100 WBC (ref 0–0.2)
PLATELET # BLD AUTO: 212 10*3/MM3 (ref 140–450)
PMV BLD AUTO: 9.2 FL (ref 6–12)
POTASSIUM SERPL-SCNC: 3.9 MMOL/L (ref 3.5–5.2)
PROT SERPL-MCNC: 6.4 G/DL (ref 6–8.5)
RBC # BLD AUTO: 4.65 10*6/MM3 (ref 4.14–5.8)
SODIUM SERPL-SCNC: 141 MMOL/L (ref 136–145)
STRESS TARGET HR: 133 BPM
WBC NRBC COR # BLD: 6.54 10*3/MM3 (ref 3.4–10.8)

## 2023-03-15 PROCEDURE — 71250 CT THORAX DX C-: CPT

## 2023-03-15 PROCEDURE — 93306 TTE W/DOPPLER COMPLETE: CPT | Performed by: INTERNAL MEDICINE

## 2023-03-15 PROCEDURE — 85025 COMPLETE CBC W/AUTO DIFF WBC: CPT | Performed by: CHIROPRACTOR

## 2023-03-15 PROCEDURE — 99232 SBSQ HOSP IP/OBS MODERATE 35: CPT | Performed by: STUDENT IN AN ORGANIZED HEALTH CARE EDUCATION/TRAINING PROGRAM

## 2023-03-15 PROCEDURE — 99233 SBSQ HOSP IP/OBS HIGH 50: CPT | Performed by: INTERNAL MEDICINE

## 2023-03-15 PROCEDURE — 93306 TTE W/DOPPLER COMPLETE: CPT

## 2023-03-15 PROCEDURE — 80053 COMPREHEN METABOLIC PANEL: CPT | Performed by: CHIROPRACTOR

## 2023-03-15 RX ORDER — ACETAMINOPHEN 650 MG/1
650 SUPPOSITORY RECTAL EVERY 4 HOURS PRN
Status: DISCONTINUED | OUTPATIENT
Start: 2023-03-15 | End: 2023-03-17 | Stop reason: HOSPADM

## 2023-03-15 RX ORDER — AMOXICILLIN 250 MG
2 CAPSULE ORAL 2 TIMES DAILY
Status: DISCONTINUED | OUTPATIENT
Start: 2023-03-15 | End: 2023-03-17 | Stop reason: HOSPADM

## 2023-03-15 RX ORDER — ACETAMINOPHEN 325 MG/1
650 TABLET ORAL EVERY 4 HOURS PRN
Status: DISCONTINUED | OUTPATIENT
Start: 2023-03-15 | End: 2023-03-17 | Stop reason: HOSPADM

## 2023-03-15 RX ORDER — SODIUM CHLORIDE 9 MG/ML
40 INJECTION, SOLUTION INTRAVENOUS AS NEEDED
Status: DISCONTINUED | OUTPATIENT
Start: 2023-03-15 | End: 2023-03-17 | Stop reason: HOSPADM

## 2023-03-15 RX ORDER — ACETAMINOPHEN 160 MG/5ML
650 SOLUTION ORAL EVERY 4 HOURS PRN
Status: DISCONTINUED | OUTPATIENT
Start: 2023-03-15 | End: 2023-03-15 | Stop reason: SDUPTHER

## 2023-03-15 RX ORDER — SODIUM CHLORIDE 0.9 % (FLUSH) 0.9 %
10 SYRINGE (ML) INJECTION AS NEEDED
Status: DISCONTINUED | OUTPATIENT
Start: 2023-03-15 | End: 2023-03-17 | Stop reason: HOSPADM

## 2023-03-15 RX ORDER — FAMOTIDINE 40 MG/1
40 TABLET, FILM COATED ORAL DAILY
Status: DISCONTINUED | OUTPATIENT
Start: 2023-03-15 | End: 2023-03-17 | Stop reason: HOSPADM

## 2023-03-15 RX ORDER — BISACODYL 10 MG
10 SUPPOSITORY, RECTAL RECTAL DAILY PRN
Status: DISCONTINUED | OUTPATIENT
Start: 2023-03-15 | End: 2023-03-17 | Stop reason: HOSPADM

## 2023-03-15 RX ORDER — BUPIVACAINE HCL/0.9 % NACL/PF 0.1 %
2 PLASTIC BAG, INJECTION (ML) EPIDURAL ONCE
Status: CANCELLED | OUTPATIENT
Start: 2023-03-16 | End: 2023-03-15

## 2023-03-15 RX ORDER — ONDANSETRON 4 MG/1
4 TABLET, FILM COATED ORAL EVERY 6 HOURS PRN
Status: DISCONTINUED | OUTPATIENT
Start: 2023-03-15 | End: 2023-03-17 | Stop reason: HOSPADM

## 2023-03-15 RX ORDER — BISACODYL 5 MG/1
5 TABLET, DELAYED RELEASE ORAL DAILY PRN
Status: DISCONTINUED | OUTPATIENT
Start: 2023-03-15 | End: 2023-03-17 | Stop reason: HOSPADM

## 2023-03-15 RX ORDER — SODIUM CHLORIDE 0.9 % (FLUSH) 0.9 %
10 SYRINGE (ML) INJECTION AS NEEDED
Status: CANCELLED | OUTPATIENT
Start: 2023-03-16

## 2023-03-15 RX ORDER — SODIUM CHLORIDE 0.9 % (FLUSH) 0.9 %
10 SYRINGE (ML) INJECTION EVERY 12 HOURS SCHEDULED
Status: DISCONTINUED | OUTPATIENT
Start: 2023-03-15 | End: 2023-03-17 | Stop reason: HOSPADM

## 2023-03-15 RX ORDER — SODIUM CHLORIDE 0.9 % (FLUSH) 0.9 %
3 SYRINGE (ML) INJECTION EVERY 12 HOURS SCHEDULED
Status: CANCELLED | OUTPATIENT
Start: 2023-03-16

## 2023-03-15 RX ORDER — SODIUM CHLORIDE 9 MG/ML
100 INJECTION, SOLUTION INTRAVENOUS CONTINUOUS
Status: DISCONTINUED | OUTPATIENT
Start: 2023-03-15 | End: 2023-03-17

## 2023-03-15 RX ORDER — SODIUM CHLORIDE 9 MG/ML
40 INJECTION, SOLUTION INTRAVENOUS AS NEEDED
Status: CANCELLED | OUTPATIENT
Start: 2023-03-16

## 2023-03-15 RX ORDER — POLYETHYLENE GLYCOL 3350 17 G/17G
17 POWDER, FOR SOLUTION ORAL DAILY PRN
Status: DISCONTINUED | OUTPATIENT
Start: 2023-03-15 | End: 2023-03-17 | Stop reason: HOSPADM

## 2023-03-15 RX ORDER — ONDANSETRON 2 MG/ML
4 INJECTION INTRAMUSCULAR; INTRAVENOUS EVERY 6 HOURS PRN
Status: DISCONTINUED | OUTPATIENT
Start: 2023-03-15 | End: 2023-03-17 | Stop reason: HOSPADM

## 2023-03-15 RX ADMIN — Medication 10 ML: at 08:46

## 2023-03-15 RX ADMIN — DOCUSATE SODIUM 50 MG AND SENNOSIDES 8.6 MG 2 TABLET: 8.6; 5 TABLET, FILM COATED ORAL at 20:05

## 2023-03-15 RX ADMIN — SODIUM CHLORIDE 100 ML/HR: 9 INJECTION, SOLUTION INTRAVENOUS at 03:00

## 2023-03-15 RX ADMIN — BISACODYL 10 MG: 10 SUPPOSITORY RECTAL at 21:37

## 2023-03-15 RX ADMIN — SODIUM CHLORIDE 100 ML/HR: 9 INJECTION, SOLUTION INTRAVENOUS at 20:06

## 2023-03-15 RX ADMIN — Medication 10 ML: at 20:05

## 2023-03-15 RX ADMIN — FAMOTIDINE 40 MG: 40 TABLET, FILM COATED ORAL at 08:45

## 2023-03-15 NOTE — CONSULTS
Cardiology at [facility]  Cardiovascular Consultation Note      Ayan Shipman  [id number]  1960    DATE OF ADMISSION: 3/14/2023  DATE OF CONSULTATION:  3/15/2023    Mo Marques MD  Treatment Team:   Attending Provider: Dennis Colbert MD  Consulting Physician: Christianne Serrano MD  Consulting Physician: Win Martinez MD  Admitting Provider: Eduard Miller MD    Chief Complaint   Patient presents with   • Dizziness       Chief complaint/ Reason for Consultation symptomatic high-grade AV block      History of Present Illness  62 years old patient who had monitor placed for near syncopal episode total 2 episode.  Monitor revealed intermittent high-grade AV block with associated symptom of near syncope.  The patient is not on AV doroteo or sinus doroteo blocking drug.  He was called by our department to be hospitalized.  Patient resting comfortably in bed.  The monitor revealed sinus rhythm family present in the room.  Patient denies orthopnea PND chest pain or intermittent claudications.  Family history significant for mother having replacement concurrent medical problem is osteoarthritis hyperlipidemia and gastroesophageal reflux disease.  Past Medical History:   Diagnosis Date   • Class 1 obesity due to excess calories with serious comorbidity and body mass index (BMI) of 30.0 to 30.9 in adult 3/8/2021   • Erectile dysfunction 2/26/2019   • GERD (gastroesophageal reflux disease)    • Hammertoes of both feet 3/8/2021   • Hemorrhoids    • Hypercholesterolemia    • Impaired fasting glucose 3/8/2021   • Impotence    • Overweight (BMI 25.0-29.9) 2/26/2019   • Palpitations    • Primary osteoarthritis involving multiple joints 10/8/2021   • Primary osteoarthritis of both knees 2/26/2019   • Shoulder pain     Sprain of shoulder and upper arm - right; improved          Past Surgical History:   Procedure Laterality Date   • COLONOSCOPY N/A 7/2/2021    Procedure: COLONOSCOPY;  Surgeon:  yKle Thakur DO;  Location: [facility] ENDOSCOPY;  Service: Gastroenterology;  Laterality: N/A;   • ENDOSCOPY AND COLONOSCOPY  03/17/2010    Internal grade I hemorrhoids found in the anus   • HERNIA REPAIR     • INJECTION OF MEDICATION  07/08/2013    Kenalog (3)      • KNEE SURGERY         No Known Allergies    No current facility-administered medications on file prior to encounter.     Current Outpatient Medications on File Prior to Encounter   Medication Sig Dispense Refill   • acetaminophen (TYLENOL) 500 MG tablet Take 1 tablet by mouth Every 6 (Six) Hours As Needed for Mild Pain.     • Ascorbic Acid (VITAMIN C PO) Take  by mouth.     • meloxicam (MOBIC) 15 MG tablet Take 1 tablet by mouth Daily. 30 tablet 5   • Nutritional Supplements (VITAMIN D BOOSTER PO) Take  by mouth.         Social History     Socioeconomic History   • Marital status:    Tobacco Use   • Smoking status: Never     Passive exposure: Never   • Smokeless tobacco: Never   Vaping Use   • Vaping Use: Never used   Substance and Sexual Activity   • Alcohol use: No   • Drug use: No   • Sexual activity: Not Currently           Review of Systems:   Constitutional:  no change in exercise tolerance.  HENT: Denies any hearing loss, epistaxis  Eyes: No blurred  Respiratory:  Denies dyspnea with exertion, no cough or wheezing  Cardiovascular: See H&P  Gastrointestinal:  Denies change in bowel habits, dyspepsia, ulcer disease  Endocrine: Negative for cold intolerance, heat intolerance, polydipsia, polyphagia  Genitourinary: Negative.  Musculoskeletal osteoarthritis   skin:  Denies rashes or skin lesions.   Allergic/Immunologic: Negative.  Negative for environmental allergies  Neurological:  Denies any history of recurrent headaches   Hematological: No history of easy bruisability  Psychiatric/Behavioral: Denies any history of depression         Objective:     Vitals:    03/15/23 0930 03/15/23 0935 03/15/23 1000 03/15/23 1100   BP: 113/73  114/60  "116/74   BP Location:       Patient Position:       Pulse: 69 69 73 62   Resp:       Temp:  97 °F (36.1 °C)     TempSrc:       SpO2: 97% 96% 97% 96%   Weight:       Height:         Body mass index is 28.59 kg/m².  Flowsheet Rows    Flowsheet Row First Filed Value   Admission Height 180.3 cm (71\") Documented at 03/14/2023 1555   Admission Weight 93.4 kg (206 lb) Documented at 03/14/2023 1555          Intake/Output Summary (Last 24 hours) at 3/15/2023 1303  Last data filed at 3/15/2023 0900  Gross per 24 hour   Intake 300 ml   Output 650 ml   Net -350 ml         Physical Exam:   Constitutional: Cooperative, alert and oriented, well-developed, well-nourished, in no acute distress.   Head: Normocephalic, conjunctivae are pink, thyroid is nonpalpable, no jugular vein distention  Cardiovascular regular rate/rhythm, no S3, no pericardial rub  Pulmonary/Chest: Chest positive bilateral, good air entry, no rales, no wheezing  Abdominal: Abdomen soft, bowel sounds normoactive  Musculoskeletal: No deformities, positive peripheral pulses,  Neurological: No gross motor or sensory deficits noted, affect appropriate.   Skin: Warm and dry to the touch, no apparent skin lesions or masses noted.   Psychiatric: Normal mood and affect. Behavior is normal.    Radiology Review    No orders to display       Lab Results:  Lab Results (last 24 hours)     Procedure Component Value Units Date/Time    Comprehensive Metabolic Panel [[id number]]  (Abnormal) Collected: 03/15/23 0329    Specimen: Blood Updated: 03/15/23 0429     Glucose 112 mg/dL      BUN 14 mg/dL      Creatinine 0.99 mg/dL      Sodium 141 mmol/L      Potassium 3.9 mmol/L      Comment: Slight hemolysis detected by analyzer. Results may be affected.        Chloride 107 mmol/L      CO2 26.0 mmol/L      Calcium 8.6 mg/dL      Total Protein 6.4 g/dL      Albumin 4.0 g/dL      ALT (SGPT) 18 U/L      AST (SGOT) 20 U/L      Alkaline Phosphatase 40 U/L      Total Bilirubin 0.3 mg/dL      " Globulin 2.4 gm/dL      A/G Ratio 1.7 g/dL      BUN/Creatinine Ratio 14.1     Anion Gap 8.0 mmol/L      eGFR 85.1 mL/min/1.73     Narrative:      GFR Normal >60  Chronic Kidney Disease <60  Kidney Failure <15      CBC & Differential [[id number]]  (Normal) Collected: 03/15/23 0329    Specimen: Blood Updated: 03/15/23 0351    Narrative:      The following orders were created for panel order CBC & Differential.  Procedure                               Abnormality         Status                     ---------                               -----------         ------                     CBC Auto Differential[[id number]]        Normal              Final result                 Please view results for these tests on the individual orders.    CBC Auto Differential [[id number]]  (Normal) Collected: 03/15/23 0329    Specimen: Blood Updated: 03/15/23 0351     WBC 6.54 10*3/mm3      RBC 4.65 10*6/mm3      Hemoglobin 14.8 g/dL      Hematocrit 43.5 %      MCV 93.5 fL      MCH 31.8 pg      MCHC 34.0 g/dL      RDW 13.2 %      RDW-SD 45.1 fl      MPV 9.2 fL      Platelets 212 10*3/mm3      Neutrophil % 54.2 %      Lymphocyte % 32.6 %      Monocyte % 8.6 %      Eosinophil % 3.5 %      Basophil % 0.8 %      Immature Grans % 0.3 %      Neutrophils, Absolute 3.55 10*3/mm3      Lymphocytes, Absolute 2.13 10*3/mm3      Monocytes, Absolute 0.56 10*3/mm3      Eosinophils, Absolute 0.23 10*3/mm3      Basophils, Absolute 0.05 10*3/mm3      Immature Grans, Absolute 0.02 10*3/mm3      nRBC 0.0 /100 WBC           I personally viewed and interpreted the patient's EKG/Telemetry data       Assessment/Plan:   #1 symptomatic high-grade AV block with associated positive    Patient with recurrent symptomatic high-grade AV block currently sinus rhythm total 2 episode.  At the time of evaluations he is resting comfortably in bed.  Given the symptomatic high-grade AV block pacemaker was recommended.  Procedure risk pros and cons discussed with the patient and  the family.  Risk included but not limited to infection, bleeding, stroke, pneumothorax, hematoma, cardiac perforation.  Lead dislodgment.  Risk range less than 1 to 7%.  Understand willing to proceed forward.  He is a Mallampati score 2 and ASA class II.  We will keep n.p.o. after midnight      #2 near syncope    Patient symptom rhythm correlation and pacemaker was recommended      We will arrange an echocardiogram to assess the biventricular function    Preventive    Overweight with a BMI of 28-28.9      Low-carb weight, low-fat, DASH diet graded exercise discussed.          This document has been electronically signed by Win Martinez MD on March 15, 2023 13:09 CDT      Thank you for allowing me to participate in the care of Ayan Shipman. Feel free to contact me directly with any further questions or concerns.    Win Martinez MD  03/15/23  13:03 CDT.      Part of this note may be an electronic transcription/translation of spoken language to printed text using the Dragon Dictation system.

## 2023-03-15 NOTE — PLAN OF CARE
Goal Outcome Evaluation:   Pt has been in NSR all night. No EKG pauses noted. NPO since midnight. Possible pacemaker today. VSS stable. Will continue to monitor.

## 2023-03-15 NOTE — PAYOR COMM NOTE
"Maritza Elaine  [facility]  Case Management Extender  [phone] phone  [phone] fax      Ref# [id number]    Contreras Blevins (62 y.o. Male)     Date of Birth   1960    Social Security Number       Address   [address] [place] [address]    Home Phone   [phone]    MRN   [id number]       Episcopalian   [facility]    Marital Status                               Admission Date   3/14/23    Admission Type   Emergency    Admitting Provider   Eduard Miller MD    Attending Provider   Dennis Colbert MD    Department, Room/Bed   [facility] CRITICAL CARE STEPDOWN, /A       Discharge Date       Discharge Disposition       Discharge Destination                               Attending Provider: Dennis Colbret MD    Allergies: No Known Allergies    Isolation: None   Infection: None   Code Status: CPR    Ht: 180.3 cm (71\")   Wt: 93 kg (205 lb)    Admission Cmt: None   Principal Problem: Sinus pause [I45.5]                 Active Insurance as of 3/14/2023     Primary Coverage     Payor Plan Insurance Group Employer/Plan Group    [employer] CROSS BLUE [employer] PPO [id number]     Payor Plan Address Payor Plan Phone Number Payor Plan Fax Number Effective Dates    [phone]  2005 - None Entered    [place] [address]       Subscriber Name Subscriber Birth Date Member ID       CONTRERAS BLEVINS 1960 [id number]                 Emergency Contacts      (Rel.) Home Phone Work Phone Mobile Phone    Latasha Blevins (Spouse) [phone] -- [phone]               History & Physical      Contreras Lopez MD at 23 1812              HISTORY AND PHYSICAL  NAME: Contreras Blevins  : 1960  MRN: [id number]    DATE OF ADMISSION:  3/14/2023     DATE & TIME SEEN: 23 at 1830    PCP: Mo Marques MD    CODE STATUS: Full code    CHIEF COMPLAINT: Near " syncope    HPI:  Ayan Shipman is a 62 y.o. male with a CMH of osteoarthritis who presents complaining of near syncopal episodes.  He reports that for the past few years he has been feeling like his heart will occasionally skip beats.  Up until this year the problem was very intermittent and did not appear to bother him.  About 1 month ago during 1 of these episodes of his heart skipping a beat he felt lightheadedness and felt like he was going to pass out although he did not.  He had a second episode again in early March.  Again without episode he felt the missed beat, felt lightheaded, took a couple of steps backwards, and felt like he was going to pass out although he did not.  He subsequently saw his PCP who referred him to cardiology.  He had a Holter monitor placed.  He got a call yesterday from the monitoring company advising him that he was experiencing 3 to 4-second episodes of sinus pause.  They recommended that he come to the ER for evaluation and consideration of pacemaker.    This is an otherwise healthy male who takes no prescribed medications on a consistent basis.    His family history is significant for his mother who needed a pacemaker.    CONCURRENT MEDICAL HISTORY:  Past Medical History:   Diagnosis Date   • Class 1 obesity due to excess calories with serious comorbidity and body mass index (BMI) of 30.0 to 30.9 in adult 3/8/2021   • Erectile dysfunction 2/26/2019   • GERD (gastroesophageal reflux disease)    • Hammertoes of both feet 3/8/2021   • Hemorrhoids    • Hypercholesterolemia    • Impaired fasting glucose 3/8/2021   • Impotence    • Overweight (BMI 25.0-29.9) 2/26/2019   • Palpitations    • Primary osteoarthritis involving multiple joints 10/8/2021   • Primary osteoarthritis of both knees 2/26/2019   • Shoulder pain     Sprain of shoulder and upper arm - right; improved          PAST SURGICAL HISTORY:  Past Surgical History:   Procedure Laterality Date   • COLONOSCOPY N/A 7/2/2021     Procedure: COLONOSCOPY;  Surgeon: Kyle Thakur DO;  Location: [facility] ENDOSCOPY;  Service: Gastroenterology;  Laterality: N/A;   • ENDOSCOPY AND COLONOSCOPY  03/17/2010    Internal grade I hemorrhoids found in the anus   • HERNIA REPAIR     • INJECTION OF MEDICATION  07/08/2013    Kenalog (3)      • KNEE SURGERY         FAMILY HISTORY:  Family History   Problem Relation Age of Onset   • Diabetes Other    • Heart disease Other    • Hypertension Other    • Arthritis Other    • Stroke Mother    • Arthritis Mother    • Hyperlipidemia Mother    • ALS Father    • Early death Father         ALS   • Diabetes Brother    • Heart disease Brother    • Cancer Brother    • Diabetes Brother    • Heart disease Brother         SOCIAL HISTORY:  Social History     Socioeconomic History   • Marital status:    Tobacco Use   • Smoking status: Never     Passive exposure: Never   • Smokeless tobacco: Never   Substance and Sexual Activity   • Alcohol use: No   • Drug use: No   • Sexual activity: Not Currently       HOME MEDICATIONS:  Prior to Admission medications    Medication Sig Start Date End Date Taking? Authorizing Provider   acetaminophen (TYLENOL) 500 MG tablet Take 1 tablet by mouth Every 6 (Six) Hours As Needed for Mild Pain.   Yes Alton Mcmillan MD   Ascorbic Acid (VITAMIN C PO) Take  by mouth.   Yes Alton Mcmillan MD   meloxicam (MOBIC) 15 MG tablet Take 1 tablet by mouth Daily. 3/3/23  Yes Mo Marques MD   Nutritional Supplements (VITAMIN D BOOSTER PO) Take  by mouth.   Yes Alton Mcmillan MD       ALLERGIES:  Patient has no known allergies.    REVIEW OF SYSTEMS  Review of Systems   Constitutional: Negative for appetite change, chills, diaphoresis, fatigue, fever and unexpected weight change.   HENT: Positive for congestion. Negative for dental problem, ear discharge, ear pain, hearing loss, mouth sores, rhinorrhea, sinus pressure, sinus pain, sore throat and trouble swallowing.    Eyes:  Negative for pain, discharge, redness and visual disturbance.   Respiratory: Negative for cough, chest tightness, shortness of breath and wheezing.    Cardiovascular: Positive for palpitations and leg swelling. Negative for chest pain.   Gastrointestinal: Negative for abdominal pain, blood in stool, constipation, diarrhea, nausea and vomiting.   Endocrine: Negative for cold intolerance and heat intolerance.   Genitourinary: Negative for difficulty urinating, dysuria and hematuria.   Musculoskeletal: Positive for arthralgias and gait problem. Negative for back pain and joint swelling.   Skin: Negative for color change.   Neurological: Positive for light-headedness. Negative for dizziness, tremors, seizures, weakness, numbness and headaches.   Hematological: Negative for adenopathy.   Psychiatric/Behavioral: Negative for behavioral problems, confusion, hallucinations and sleep disturbance.       PHYSICAL EXAM:  Temp:  [97.7 °F (36.5 °C)-98 °F (36.7 °C)] 97.8 °F (36.6 °C)  Heart Rate:  [71] 71  Resp:  [16-22] 18  BP: (170)/(89) 170/89  Body mass index is 28.91 kg/m².  Physical Exam  Vitals reviewed.   Constitutional:       Appearance: He is normal weight. He is not ill-appearing or diaphoretic.   HENT:      Head: Atraumatic.      Mouth/Throat:      Mouth: Mucous membranes are moist.   Eyes:      General: No scleral icterus.  Neck:      Vascular: No carotid bruit.   Cardiovascular:      Rate and Rhythm: Normal rate and regular rhythm.      Pulses: Normal pulses.      Heart sounds: No murmur heard.    No gallop.   Pulmonary:      Effort: No respiratory distress.      Breath sounds: No wheezing.   Abdominal:      General: Bowel sounds are normal.      Palpations: There is no mass.      Tenderness: There is no abdominal tenderness.   Musculoskeletal:      Right lower leg: No edema.      Left lower leg: No edema.   Lymphadenopathy:      Cervical: No cervical adenopathy.   Skin:     Capillary Refill: Capillary refill takes  less than 2 seconds.      Findings: No lesion or rash.   Neurological:      General: No focal deficit present.      Mental Status: He is alert.      Sensory: No sensory deficit.      Motor: No weakness.   Psychiatric:         Mood and Affect: Mood normal.         Thought Content: Thought content normal.         Judgment: Judgment normal.         DIAGNOSTIC DATA:   Lab Results (last 24 hours)     Procedure Component Value Units Date/Time    Comprehensive Metabolic Panel [[id number]]  (Abnormal) Collected: 03/15/23 0329    Specimen: Blood Updated: 03/15/23 0429     Glucose 112 mg/dL      BUN 14 mg/dL      Creatinine 0.99 mg/dL      Sodium 141 mmol/L      Potassium 3.9 mmol/L      Comment: Slight hemolysis detected by analyzer. Results may be affected.        Chloride 107 mmol/L      CO2 26.0 mmol/L      Calcium 8.6 mg/dL      Total Protein 6.4 g/dL      Albumin 4.0 g/dL      ALT (SGPT) 18 U/L      AST (SGOT) 20 U/L      Alkaline Phosphatase 40 U/L      Total Bilirubin 0.3 mg/dL      Globulin 2.4 gm/dL      A/G Ratio 1.7 g/dL      BUN/Creatinine Ratio 14.1     Anion Gap 8.0 mmol/L      eGFR 85.1 mL/min/1.73     Narrative:      GFR Normal >60  Chronic Kidney Disease <60  Kidney Failure <15      CBC & Differential [[id number]]  (Normal) Collected: 03/15/23 0329    Specimen: Blood Updated: 03/15/23 0351    Narrative:      The following orders were created for panel order CBC & Differential.  Procedure                               Abnormality         Status                     ---------                               -----------         ------                     CBC Auto Differential[[id number]]        Normal              Final result                 Please view results for these tests on the individual orders.    CBC Auto Differential [[id number]]  (Normal) Collected: 03/15/23 0329    Specimen: Blood Updated: 03/15/23 0351     WBC 6.54 10*3/mm3      RBC 4.65 10*6/mm3      Hemoglobin 14.8 g/dL      Hematocrit 43.5 %      MCV  93.5 fL      MCH 31.8 pg      MCHC 34.0 g/dL      RDW 13.2 %      RDW-SD 45.1 fl      MPV 9.2 fL      Platelets 212 10*3/mm3      Neutrophil % 54.2 %      Lymphocyte % 32.6 %      Monocyte % 8.6 %      Eosinophil % 3.5 %      Basophil % 0.8 %      Immature Grans % 0.3 %      Neutrophils, Absolute 3.55 10*3/mm3      Lymphocytes, Absolute 2.13 10*3/mm3      Monocytes, Absolute 0.56 10*3/mm3      Eosinophils, Absolute 0.23 10*3/mm3      Basophils, Absolute 0.05 10*3/mm3      Immature Grans, Absolute 0.02 10*3/mm3      nRBC 0.0 /100 WBC            Imaging Results (Last 24 Hours)     ** No results found for the last 24 hours. **            I reviewed the patient's new clinical results.    ASSESSMENT AND PLAN: This is a 62 y.o. male with a several year history of feeling like his heart has been skipping beats however no other symptoms until about 1 month ago when the skipped beat sensation began to be associated with some lightheadedness and a feeling like he was going to pass out.  Has had a Holter monitor on.  Monitoring company called due to sinus pauses and advised him to come to ER.  Dr. Serrano consulted recommend patient be admitted to stepdown.  Pacer pads have been applied to patient's chest.  Atropine is in room.  ZOLL is in room.    Active Hospital Problems    Diagnosis  POA   • **Sinus pause [I45.5]  Yes     Sinus pause  - Continuous cardiac monitoring  - Admit to stepdown unit  - Pacer pads in place with pacemaker device in room.  - Atropine 0.4 mg 2 doses in room with patient.  - N.p.o. after midnight for possible pacemaker implantation  - Cardiology consulted Dr. Guzman on board.    DVT prophylaxis: SCDs/TEDs     Ayan Shipman and I have discussed pain goals for this hospitalization after reviewing his current clinical condition, medical history and prior pain experiences.  The goal is to keep the pain level mild  .  To help achieve this, I plan to use Tylenol as needed.    PDMP reviewed and consistent  with patient reported medications.    Expected Length of Stay: 2 days    I discussed the patient's findings and my recommendations with patient and family.     Eduard Miller MD is the attending on record at time of admission, She is aware of the patient's status and agrees with the above history and physical.        This document has been electronically signed by Ayan Lopez MD on March 15, 2023 04:54 CDT        Electronically signed by Ayan Lopez MD at 03/15/23 7759          Emergency Department Notes      Remigio Cuevas DO at 03/14/23 1640          Subjective   History of Present Illness  This is a 62-year-old male who presents for near syncope.  The patient has been having episodes for about 1 to 2 weeks.  He was seen for this issue by his family doctor and placed on a 7-day Holter monitor which had abnormal findings yesterday including a couple of 3 to 4-second sinus pauses.  He received an emergent phone call from the interpreting cardiology service who recommended that he go to the emergency department for evaluation and consideration of a pacemaker.  The patient denies any recent changes to his home medications.  He denies chest pain or shortness of breath.  He does occasionally experience episodes of lightheadedness sometimes with associated palpitations.  He denies any complete syncopal episodes.  He has a family history of a mother who required a pacemaker as well.        Review of Systems   All other systems reviewed and are negative.      Past Medical History:   Diagnosis Date   • Class 1 obesity due to excess calories with serious comorbidity and body mass index (BMI) of 30.0 to 30.9 in adult 3/8/2021   • Erectile dysfunction 2/26/2019   • GERD (gastroesophageal reflux disease)    • Hammertoes of both feet 3/8/2021   • Hemorrhoids    • Hypercholesterolemia    • Impaired fasting glucose 3/8/2021   • Impotence    • Overweight (BMI 25.0-29.9) 2/26/2019   • Palpitations    • Primary osteoarthritis  involving multiple joints 10/8/2021   • Primary osteoarthritis of both knees 2/26/2019   • Shoulder pain     Sprain of shoulder and upper arm - right; improved          No Known Allergies    Past Surgical History:   Procedure Laterality Date   • COLONOSCOPY N/A 7/2/2021    Procedure: COLONOSCOPY;  Surgeon: Kyle Thakur DO;  Location: [facility] ENDOSCOPY;  Service: Gastroenterology;  Laterality: N/A;   • ENDOSCOPY AND COLONOSCOPY  03/17/2010    Internal grade I hemorrhoids found in the anus   • HERNIA REPAIR     • INJECTION OF MEDICATION  07/08/2013    Kenalog (3)      • KNEE SURGERY         Family History   Problem Relation Age of Onset   • Diabetes Other    • Heart disease Other    • Hypertension Other    • Arthritis Other    • Stroke Mother    • Arthritis Mother    • Hyperlipidemia Mother    • ALS Father    • Early death Father         ALS   • Diabetes Brother    • Heart disease Brother    • Cancer Brother    • Diabetes Brother    • Heart disease Brother        Social History     Socioeconomic History   • Marital status:    Tobacco Use   • Smoking status: Never     Passive exposure: Never   • Smokeless tobacco: Never   Substance and Sexual Activity   • Alcohol use: No   • Drug use: No   • Sexual activity: Not Currently           Objective   Physical Exam  Vitals and nursing note reviewed.   Constitutional:       Appearance: He is not ill-appearing.   HENT:      Head: Normocephalic and atraumatic.      Nose: Nose normal.   Eyes:      General: No scleral icterus.  Cardiovascular:      Rate and Rhythm: Normal rate and regular rhythm.      Heart sounds: Murmur heard.   Pulmonary:      Effort: Pulmonary effort is normal.      Breath sounds: Normal breath sounds.   Abdominal:      General: Abdomen is flat.      Tenderness: There is no abdominal tenderness.   Musculoskeletal:         General: No signs of injury.   Skin:     General: Skin is warm and dry.   Neurological:      Mental Status: He is alert and  "oriented to person, place, and time.   Psychiatric:         Behavior: Behavior normal.         Procedures          ED Course                                           Medical Decision Making  The patient's recent past medical charts for this facility as well as outside facilities via the \"care everywhere\" application of epic reviewed.  Patient was recently seen by primary care for near syncopal episodes of palpitations and was set up for a cardiac Holter monitor.  Today the patient received a phone call from cardiology recommending he come to the emergency department for admission.    \"Dr. Marques called regarding patient having upcoming visit for syncope.  Holter monitor was placed at [facility].  Patient had event on 3-13-23 at 12:00 PM with sinus pause with ventricular escape beat at 3.6 secs and another at 3.4 secs. Patient called and recommended to go to our ER for further evaluation and admission to possibly consider pacemaker.\"    The differential diagnosis includes cardiac dysrhythmia, anemia, dehydration, and medication side effect, among others.        Near syncope: acute illness or injury  Sinus pause: acute illness or injury  Amount and/or Complexity of Data Reviewed  Independent Historian: spouse     Details: Provided additional history regarding the patient's past medical problems.  Labs: ordered. Decision-making details documented in ED Course.  ECG/medicine tests: ordered and independent interpretation performed.     Details: EKG interpretation: Interpreted myself.  Normal sinus rhythm.  Rate 71.  Normal P wave and HI interval.  Normal QRS and axis.  Normal QTc interval.  ST segments are normal.  Discussion of management or test interpretation with external provider(s): Case discussed with admitting family practice residency service.    Risk  OTC drugs.  Prescription drug management.  Decision regarding hospitalization.          Final diagnoses:   Sinus pause   Near syncope       ED " Disposition  ED Disposition     ED Disposition   Decision to Admit    Condition   --    Comment   Level of Care: Stepdown [25]   Diagnosis: Sinus pause [415016]   Admitting Physician: RASHI VARGAS [[id number]]   Attending Physician: RASHI VARGAS [[id number]]   Certification: I Certify That Inpatient Hospital Services Are Medically Necessary For Greater Than 2 Midnights               No follow-up provider specified.       Medication List      No changes were made to your prescriptions during this visit.          Remigio Cuevas DO  03/14/23 2342      Electronically signed by Remigio Cuevas DO at 03/14/23 2342         Lab Results (last 24 hours)     Procedure Component Value Units Date/Time    Comprehensive Metabolic Panel [[id number]]  (Abnormal) Collected: 03/15/23 0329    Specimen: Blood Updated: 03/15/23 0429     Glucose 112 mg/dL      BUN 14 mg/dL      Creatinine 0.99 mg/dL      Sodium 141 mmol/L      Potassium 3.9 mmol/L      Comment: Slight hemolysis detected by analyzer. Results may be affected.        Chloride 107 mmol/L      CO2 26.0 mmol/L      Calcium 8.6 mg/dL      Total Protein 6.4 g/dL      Albumin 4.0 g/dL      ALT (SGPT) 18 U/L      AST (SGOT) 20 U/L      Alkaline Phosphatase 40 U/L      Total Bilirubin 0.3 mg/dL      Globulin 2.4 gm/dL      A/G Ratio 1.7 g/dL      BUN/Creatinine Ratio 14.1     Anion Gap 8.0 mmol/L      eGFR 85.1 mL/min/1.73     Narrative:      GFR Normal >60  Chronic Kidney Disease <60  Kidney Failure <15      CBC & Differential [[id number]]  (Normal) Collected: 03/15/23 0329    Specimen: Blood Updated: 03/15/23 0351    Narrative:      The following orders were created for panel order CBC & Differential.  Procedure                               Abnormality         Status                     ---------                               -----------         ------                     CBC Auto Differential[[id number]]        Normal              Final result                 Please view  results for these tests on the individual orders.    CBC Auto Differential [[id number]]  (Normal) Collected: 03/15/23 0329    Specimen: Blood Updated: 03/15/23 035     WBC 6.54 10*3/mm3      RBC 4.65 10*6/mm3      Hemoglobin 14.8 g/dL      Hematocrit 43.5 %      MCV 93.5 fL      MCH 31.8 pg      MCHC 34.0 g/dL      RDW 13.2 %      RDW-SD 45.1 fl      MPV 9.2 fL      Platelets 212 10*3/mm3      Neutrophil % 54.2 %      Lymphocyte % 32.6 %      Monocyte % 8.6 %      Eosinophil % 3.5 %      Basophil % 0.8 %      Immature Grans % 0.3 %      Neutrophils, Absolute 3.55 10*3/mm3      Lymphocytes, Absolute 2.13 10*3/mm3      Monocytes, Absolute 0.56 10*3/mm3      Eosinophils, Absolute 0.23 10*3/mm3      Basophils, Absolute 0.05 10*3/mm3      Immature Grans, Absolute 0.02 10*3/mm3      nRBC 0.0 /100 WBC         Imaging Results (Last 24 Hours)     ** No results found for the last 24 hours. **        ECG/EMG Results (last 24 hours)     Procedure Component Value Units Date/Time    ECG 12 Lead Other; Dizziness [[id number]] Collected: 23 1602     Updated: 03/15/23 05     QT Interval 394 ms      QTC Interval 428 ms     Narrative:      Test Reason : DIZZINESS  Blood Pressure :   */*   mmHG  Vent. Rate :  71 BPM     Atrial Rate :  71 BPM     P-R Int : 156 ms          QRS Dur :  84 ms      QT Int : 394 ms       P-R-T Axes :  31  19  43 degrees     QTc Int : 428 ms    Normal sinus rhythm  Septal infarct , age undetermined  Abnormal ECG  No previous ECGs available    Referred By: ERDR           Confirmed By:              Physician Progress Notes (last 24 hours)      Dennis Colbert MD at 03/15/23 0610              FAMILY MEDICINE RESIDENCY SERVICE  DAILY PROGRESS NOTE    NAME: Ayan Shipman  : 1960  MRN: [id number]      LOS: 1 day     PROVIDER OF SERVICE: Dennis Colbert MD    Chief Complaint: Sinus pause    Subjective:     Interval History:  History taken from: patient chart  No events overnight. VSS.  Patient aware of plan for potential pacemaker today.     Review of Systems:   Review of Systems   Constitutional: Negative for fever.   Respiratory: Negative for shortness of breath.    Cardiovascular: Negative for chest pain, palpitations and leg swelling.   Gastrointestinal: Negative for abdominal pain, constipation, diarrhea, nausea and vomiting.   Genitourinary: Negative for dysuria.   Neurological: Negative for dizziness and light-headedness.       Objective:     Vital Signs  Temp:  [97.7 °F (36.5 °C)-98.1 °F (36.7 °C)] 98.1 °F (36.7 °C)  Heart Rate:  [64-88] 64  Resp:  [16-22] 18  BP: ()/(59-89) 117/78   Body mass index is 28.59 kg/m².    Physical Exam  Physical Exam  Vitals reviewed.   Constitutional:       General: He is not in acute distress.  HENT:      Head: Normocephalic and atraumatic.   Cardiovascular:      Rate and Rhythm: Normal rate and regular rhythm.      Pulses: Normal pulses.   Pulmonary:      Effort: Pulmonary effort is normal. No respiratory distress.      Breath sounds: No rhonchi or rales.   Abdominal:      Palpations: Abdomen is soft.      Tenderness: There is no abdominal tenderness.   Musculoskeletal:      Right lower leg: No edema.      Left lower leg: No edema.   Skin:     General: Skin is warm.   Neurological:      Mental Status: He is alert. Mental status is at baseline.   Psychiatric:         Speech: Speech normal.         Behavior: Behavior is cooperative.         Scheduled Meds   famotidine, 40 mg, Oral, Daily  senna-docusate sodium, 2 tablet, Oral, BID  sodium chloride, 10 mL, Intravenous, Q12H       PRN Meds   •  acetaminophen **OR** [DISCONTINUED] acetaminophen **OR** acetaminophen  •  atropine  •  senna-docusate sodium **AND** polyethylene glycol **AND** bisacodyl **AND** bisacodyl  •  ondansetron **OR** ondansetron  •  [COMPLETED] Insert Peripheral IV **AND** sodium chloride  •  sodium chloride  •  sodium chloride      Diagnostic Data    Lab Results (last 24 hours)      Procedure Component Value Units Date/Time    Comprehensive Metabolic Panel [[id number]]  (Abnormal) Collected: 03/15/23 0329    Specimen: Blood Updated: 03/15/23 0429     Glucose 112 mg/dL      BUN 14 mg/dL      Creatinine 0.99 mg/dL      Sodium 141 mmol/L      Potassium 3.9 mmol/L      Comment: Slight hemolysis detected by analyzer. Results may be affected.        Chloride 107 mmol/L      CO2 26.0 mmol/L      Calcium 8.6 mg/dL      Total Protein 6.4 g/dL      Albumin 4.0 g/dL      ALT (SGPT) 18 U/L      AST (SGOT) 20 U/L      Alkaline Phosphatase 40 U/L      Total Bilirubin 0.3 mg/dL      Globulin 2.4 gm/dL      A/G Ratio 1.7 g/dL      BUN/Creatinine Ratio 14.1     Anion Gap 8.0 mmol/L      eGFR 85.1 mL/min/1.73     Narrative:      GFR Normal >60  Chronic Kidney Disease <60  Kidney Failure <15      CBC & Differential [[id number]]  (Normal) Collected: 03/15/23 0329    Specimen: Blood Updated: 03/15/23 0351    Narrative:      The following orders were created for panel order CBC & Differential.  Procedure                               Abnormality         Status                     ---------                               -----------         ------                     CBC Auto Differential[[id number]]        Normal              Final result                 Please view results for these tests on the individual orders.    CBC Auto Differential [[id number]]  (Normal) Collected: 03/15/23 0329    Specimen: Blood Updated: 03/15/23 0351     WBC 6.54 10*3/mm3      RBC 4.65 10*6/mm3      Hemoglobin 14.8 g/dL      Hematocrit 43.5 %      MCV 93.5 fL      MCH 31.8 pg      MCHC 34.0 g/dL      RDW 13.2 %      RDW-SD 45.1 fl      MPV 9.2 fL      Platelets 212 10*3/mm3      Neutrophil % 54.2 %      Lymphocyte % 32.6 %      Monocyte % 8.6 %      Eosinophil % 3.5 %      Basophil % 0.8 %      Immature Grans % 0.3 %      Neutrophils, Absolute 3.55 10*3/mm3      Lymphocytes, Absolute 2.13 10*3/mm3      Monocytes, Absolute 0.56 10*3/mm3       Eosinophils, Absolute 0.23 10*3/mm3      Basophils, Absolute 0.05 10*3/mm3      Immature Grans, Absolute 0.02 10*3/mm3      nRBC 0.0 /100 WBC           No radiology results for the last day      I reviewed the patient's new clinical results.    Assessment/Plan:     Active and Resolved Problems  Active Hospital Problems    Diagnosis  POA   • **Sinus pause [I45.5]  Yes      Resolved Hospital Problems   No resolved problems to display.     Sinus pause  - Continuous cardiac monitoring  - Pacer pads in place with pacemaker device in room.  - Atropine 0.4 mg 2 doses in room with patient.  - Cardiology consulted Dr. Guzman on board, appeciate recs  - plan for pacemaker today    DVT prophylaxis:  Mechanical DVT prophylaxis orders are present.     Code status is   Code Status and Medical Interventions:   Ordered at: 03/14/23 1812     Level Of Support Discussed With:    Patient     Code Status (Patient has no pulse and is not breathing):    CPR (Attempt to Resuscitate)     Medical Interventions (Patient has pulse or is breathing):    Full Support       Plan for disposition: Home in 2-3 days    Time: 30 minutes      This document has been electronically signed by Dennis Colbert MD on March 15, 2023 06:16 CDT      Electronically signed by Dennis Colbert MD at 03/15/23 0805       Medical Student Notes (last 24 hours)  Notes from 03/14/23 1136 through 03/15/23 1136   No notes of this type exist for this encounter.         Consult Notes (last 24 hours)  Notes from 03/14/23 1136 through 03/15/23 1136   No notes of this type exist for this encounter.

## 2023-03-15 NOTE — PROGRESS NOTES
FAMILY MEDICINE RESIDENCY SERVICE  DAILY PROGRESS NOTE    NAME: Ayan Shipman  : 1960  MRN: [id number]      LOS: 1 day     PROVIDER OF SERVICE: Dennis Colbert MD    Chief Complaint: Sinus pause    Subjective:     Interval History:  History taken from: patient chart  No events overnight. VSS. Patient aware of plan for potential pacemaker today.     Review of Systems:   Review of Systems   Constitutional: Negative for fever.   Respiratory: Negative for shortness of breath.    Cardiovascular: Negative for chest pain, palpitations and leg swelling.   Gastrointestinal: Negative for abdominal pain, constipation, diarrhea, nausea and vomiting.   Genitourinary: Negative for dysuria.   Neurological: Negative for dizziness and light-headedness.       Objective:     Vital Signs  Temp:  [97.7 °F (36.5 °C)-98.1 °F (36.7 °C)] 98.1 °F (36.7 °C)  Heart Rate:  [64-88] 64  Resp:  [16-22] 18  BP: ()/(59-89) 117/78   Body mass index is 28.59 kg/m².    Physical Exam  Physical Exam  Vitals reviewed.   Constitutional:       General: He is not in acute distress.  HENT:      Head: Normocephalic and atraumatic.   Cardiovascular:      Rate and Rhythm: Normal rate and regular rhythm.      Pulses: Normal pulses.   Pulmonary:      Effort: Pulmonary effort is normal. No respiratory distress.      Breath sounds: No rhonchi or rales.   Abdominal:      Palpations: Abdomen is soft.      Tenderness: There is no abdominal tenderness.   Musculoskeletal:      Right lower leg: No edema.      Left lower leg: No edema.   Skin:     General: Skin is warm.   Neurological:      Mental Status: He is alert. Mental status is at baseline.   Psychiatric:         Speech: Speech normal.         Behavior: Behavior is cooperative.         Scheduled Meds   famotidine, 40 mg, Oral, Daily  senna-docusate sodium, 2 tablet, Oral, BID  sodium chloride, 10 mL, Intravenous, Q12H       PRN Meds   •  acetaminophen **OR** [DISCONTINUED] acetaminophen  **OR** acetaminophen  •  atropine  •  senna-docusate sodium **AND** polyethylene glycol **AND** bisacodyl **AND** bisacodyl  •  ondansetron **OR** ondansetron  •  [COMPLETED] Insert Peripheral IV **AND** sodium chloride  •  sodium chloride  •  sodium chloride      Diagnostic Data    Lab Results (last 24 hours)     Procedure Component Value Units Date/Time    Comprehensive Metabolic Panel [[id number]]  (Abnormal) Collected: 03/15/23 0329    Specimen: Blood Updated: 03/15/23 0429     Glucose 112 mg/dL      BUN 14 mg/dL      Creatinine 0.99 mg/dL      Sodium 141 mmol/L      Potassium 3.9 mmol/L      Comment: Slight hemolysis detected by analyzer. Results may be affected.        Chloride 107 mmol/L      CO2 26.0 mmol/L      Calcium 8.6 mg/dL      Total Protein 6.4 g/dL      Albumin 4.0 g/dL      ALT (SGPT) 18 U/L      AST (SGOT) 20 U/L      Alkaline Phosphatase 40 U/L      Total Bilirubin 0.3 mg/dL      Globulin 2.4 gm/dL      A/G Ratio 1.7 g/dL      BUN/Creatinine Ratio 14.1     Anion Gap 8.0 mmol/L      eGFR 85.1 mL/min/1.73     Narrative:      GFR Normal >60  Chronic Kidney Disease <60  Kidney Failure <15      CBC & Differential [[id number]]  (Normal) Collected: 03/15/23 0329    Specimen: Blood Updated: 03/15/23 0351    Narrative:      The following orders were created for panel order CBC & Differential.  Procedure                               Abnormality         Status                     ---------                               -----------         ------                     CBC Auto Differential[[id number]]        Normal              Final result                 Please view results for these tests on the individual orders.    CBC Auto Differential [[id number]]  (Normal) Collected: 03/15/23 0329    Specimen: Blood Updated: 03/15/23 0351     WBC 6.54 10*3/mm3      RBC 4.65 10*6/mm3      Hemoglobin 14.8 g/dL      Hematocrit 43.5 %      MCV 93.5 fL      MCH 31.8 pg      MCHC 34.0 g/dL      RDW 13.2 %      RDW-SD 45.1 fl       MPV 9.2 fL      Platelets 212 10*3/mm3      Neutrophil % 54.2 %      Lymphocyte % 32.6 %      Monocyte % 8.6 %      Eosinophil % 3.5 %      Basophil % 0.8 %      Immature Grans % 0.3 %      Neutrophils, Absolute 3.55 10*3/mm3      Lymphocytes, Absolute 2.13 10*3/mm3      Monocytes, Absolute 0.56 10*3/mm3      Eosinophils, Absolute 0.23 10*3/mm3      Basophils, Absolute 0.05 10*3/mm3      Immature Grans, Absolute 0.02 10*3/mm3      nRBC 0.0 /100 WBC           No radiology results for the last day      I reviewed the patient's new clinical results.    Assessment/Plan:     Active and Resolved Problems  Active Hospital Problems    Diagnosis  POA   • **Sinus pause [I45.5]  Yes      Resolved Hospital Problems   No resolved problems to display.     Sinus pause  - Continuous cardiac monitoring  - Pacer pads in place with pacemaker device in room.  - Atropine 0.4 mg 2 doses in room with patient.  - Cardiology consulted Dr. Guzman on board, appeciate recs  - plan for pacemaker today    DVT prophylaxis:  Mechanical DVT prophylaxis orders are present.     Code status is   Code Status and Medical Interventions:   Ordered at: 03/14/23 1812     Level Of Support Discussed With:    Patient     Code Status (Patient has no pulse and is not breathing):    CPR (Attempt to Resuscitate)     Medical Interventions (Patient has pulse or is breathing):    Full Support       Plan for disposition: Home in 2-3 days    Time: 30 minutes      This document has been electronically signed by Dennis Colbert MD on March 15, 2023 06:16 CDT

## 2023-03-15 NOTE — ACP (ADVANCE CARE PLANNING)
Patient wishes to be a full code. They do wish to have chest compressions, emergency medications and be intubated and placed on a ventilator should their heart stop or they stop breathing. They do have a living will or advanced care directive. They would like to designate his wife Latasha to make medical decisions on their behalf should they not be able to. Number [phone].        This document has been electronically signed by Ayan Lopez MD on March 14, 2023 19:45 CDT

## 2023-03-16 ENCOUNTER — APPOINTMENT (OUTPATIENT)
Dept: GENERAL RADIOLOGY | Facility: HOSPITAL | Age: 63
DRG: 244 | End: 2023-03-16
Payer: COMMERCIAL

## 2023-03-16 ENCOUNTER — APPOINTMENT (OUTPATIENT)
Dept: CT IMAGING | Facility: HOSPITAL | Age: 63
DRG: 244 | End: 2023-03-16
Payer: COMMERCIAL

## 2023-03-16 LAB
ALBUMIN SERPL-MCNC: 3.9 G/DL (ref 3.5–5.2)
ALBUMIN/GLOB SERPL: 1.6 G/DL
ALP SERPL-CCNC: 40 U/L (ref 39–117)
ALT SERPL W P-5'-P-CCNC: 17 U/L (ref 1–41)
ANION GAP SERPL CALCULATED.3IONS-SCNC: 11 MMOL/L (ref 5–15)
ANION GAP SERPL CALCULATED.3IONS-SCNC: 9 MMOL/L (ref 5–15)
AST SERPL-CCNC: 21 U/L (ref 1–40)
BASOPHILS # BLD AUTO: 0.06 10*3/MM3 (ref 0–0.2)
BASOPHILS NFR BLD AUTO: 0.9 % (ref 0–1.5)
BILIRUB SERPL-MCNC: 0.6 MG/DL (ref 0–1.2)
BUN SERPL-MCNC: 12 MG/DL (ref 8–23)
BUN SERPL-MCNC: 13 MG/DL (ref 8–23)
BUN/CREAT SERPL: 13.6 (ref 7–25)
BUN/CREAT SERPL: 14.8 (ref 7–25)
CALCIUM SPEC-SCNC: 8.6 MG/DL (ref 8.6–10.5)
CALCIUM SPEC-SCNC: 8.7 MG/DL (ref 8.6–10.5)
CHLORIDE SERPL-SCNC: 105 MMOL/L (ref 98–107)
CHLORIDE SERPL-SCNC: 106 MMOL/L (ref 98–107)
CO2 SERPL-SCNC: 23 MMOL/L (ref 22–29)
CO2 SERPL-SCNC: 25 MMOL/L (ref 22–29)
CREAT SERPL-MCNC: 0.88 MG/DL (ref 0.76–1.27)
CREAT SERPL-MCNC: 0.88 MG/DL (ref 0.76–1.27)
DEPRECATED RDW RBC AUTO: 43.8 FL (ref 37–54)
DEPRECATED RDW RBC AUTO: 44.3 FL (ref 37–54)
EGFRCR SERPLBLD CKD-EPI 2021: 96 ML/MIN/1.73
EGFRCR SERPLBLD CKD-EPI 2021: 96 ML/MIN/1.73
EOSINOPHIL # BLD AUTO: 0.21 10*3/MM3 (ref 0–0.4)
EOSINOPHIL NFR BLD AUTO: 3 % (ref 0.3–6.2)
ERYTHROCYTE [DISTWIDTH] IN BLOOD BY AUTOMATED COUNT: 13 % (ref 12.3–15.4)
ERYTHROCYTE [DISTWIDTH] IN BLOOD BY AUTOMATED COUNT: 13.1 % (ref 12.3–15.4)
GLOBULIN UR ELPH-MCNC: 2.4 GM/DL
GLUCOSE SERPL-MCNC: 105 MG/DL (ref 65–99)
GLUCOSE SERPL-MCNC: 120 MG/DL (ref 65–99)
HCT VFR BLD AUTO: 42.3 % (ref 37.5–51)
HCT VFR BLD AUTO: 44.9 % (ref 37.5–51)
HGB BLD-MCNC: 14.8 G/DL (ref 13–17.7)
HGB BLD-MCNC: 15.5 G/DL (ref 13–17.7)
IMM GRANULOCYTES # BLD AUTO: 0.02 10*3/MM3 (ref 0–0.05)
IMM GRANULOCYTES NFR BLD AUTO: 0.3 % (ref 0–0.5)
INR PPP: 1.1 (ref 0.8–1.2)
LYMPHOCYTES # BLD AUTO: 2.09 10*3/MM3 (ref 0.7–3.1)
LYMPHOCYTES NFR BLD AUTO: 30.1 % (ref 19.6–45.3)
MCH RBC QN AUTO: 31.7 PG (ref 26.6–33)
MCH RBC QN AUTO: 32.5 PG (ref 26.6–33)
MCHC RBC AUTO-ENTMCNC: 34.5 G/DL (ref 31.5–35.7)
MCHC RBC AUTO-ENTMCNC: 35 G/DL (ref 31.5–35.7)
MCV RBC AUTO: 91.8 FL (ref 79–97)
MCV RBC AUTO: 92.8 FL (ref 79–97)
MONOCYTES # BLD AUTO: 0.55 10*3/MM3 (ref 0.1–0.9)
MONOCYTES NFR BLD AUTO: 7.9 % (ref 5–12)
NEUTROPHILS NFR BLD AUTO: 4.02 10*3/MM3 (ref 1.7–7)
NEUTROPHILS NFR BLD AUTO: 57.8 % (ref 42.7–76)
NRBC BLD AUTO-RTO: 0 /100 WBC (ref 0–0.2)
PLATELET # BLD AUTO: 199 10*3/MM3 (ref 140–450)
PLATELET # BLD AUTO: 207 10*3/MM3 (ref 140–450)
PMV BLD AUTO: 9.1 FL (ref 6–12)
PMV BLD AUTO: 9.1 FL (ref 6–12)
POTASSIUM SERPL-SCNC: 3.9 MMOL/L (ref 3.5–5.2)
POTASSIUM SERPL-SCNC: 4.7 MMOL/L (ref 3.5–5.2)
PROT SERPL-MCNC: 6.3 G/DL (ref 6–8.5)
PROTHROMBIN TIME: 14.1 SECONDS (ref 11.1–15.3)
QT INTERVAL: 394 MS
QTC INTERVAL: 428 MS
RBC # BLD AUTO: 4.56 10*6/MM3 (ref 4.14–5.8)
RBC # BLD AUTO: 4.89 10*6/MM3 (ref 4.14–5.8)
SODIUM SERPL-SCNC: 139 MMOL/L (ref 136–145)
SODIUM SERPL-SCNC: 140 MMOL/L (ref 136–145)
WBC NRBC COR # BLD: 5.81 10*3/MM3 (ref 3.4–10.8)
WBC NRBC COR # BLD: 6.95 10*3/MM3 (ref 3.4–10.8)

## 2023-03-16 PROCEDURE — 02HK3JZ INSERTION OF PACEMAKER LEAD INTO RIGHT VENTRICLE, PERCUTANEOUS APPROACH: ICD-10-PCS | Performed by: INTERNAL MEDICINE

## 2023-03-16 PROCEDURE — C1894 INTRO/SHEATH, NON-LASER: HCPCS | Performed by: INTERNAL MEDICINE

## 2023-03-16 PROCEDURE — C1898 LEAD, PMKR, OTHER THAN TRANS: HCPCS | Performed by: INTERNAL MEDICINE

## 2023-03-16 PROCEDURE — 71045 X-RAY EXAM CHEST 1 VIEW: CPT

## 2023-03-16 PROCEDURE — 0JH606Z INSERTION OF PACEMAKER, DUAL CHAMBER INTO CHEST SUBCUTANEOUS TISSUE AND FASCIA, OPEN APPROACH: ICD-10-PCS | Performed by: INTERNAL MEDICINE

## 2023-03-16 PROCEDURE — 80053 COMPREHEN METABOLIC PANEL: CPT | Performed by: CHIROPRACTOR

## 2023-03-16 PROCEDURE — 33208 INSRT HEART PM ATRIAL & VENT: CPT | Performed by: INTERNAL MEDICINE

## 2023-03-16 PROCEDURE — C1785 PMKR, DUAL, RATE-RESP: HCPCS | Performed by: INTERNAL MEDICINE

## 2023-03-16 PROCEDURE — 85610 PROTHROMBIN TIME: CPT | Performed by: INTERNAL MEDICINE

## 2023-03-16 PROCEDURE — 25010000002 GENTAMICIN PER 80 MG: Performed by: INTERNAL MEDICINE

## 2023-03-16 PROCEDURE — 25510000001 IOPAMIDOL PER 1 ML: Performed by: STUDENT IN AN ORGANIZED HEALTH CARE EDUCATION/TRAINING PROGRAM

## 2023-03-16 PROCEDURE — 71275 CT ANGIOGRAPHY CHEST: CPT

## 2023-03-16 PROCEDURE — 99232 SBSQ HOSP IP/OBS MODERATE 35: CPT | Performed by: STUDENT IN AN ORGANIZED HEALTH CARE EDUCATION/TRAINING PROGRAM

## 2023-03-16 PROCEDURE — 85025 COMPLETE CBC W/AUTO DIFF WBC: CPT | Performed by: CHIROPRACTOR

## 2023-03-16 PROCEDURE — 25510000001 IOPAMIDOL PER 1 ML: Performed by: INTERNAL MEDICINE

## 2023-03-16 PROCEDURE — 25010000002 MIDAZOLAM PER 1 MG: Performed by: INTERNAL MEDICINE

## 2023-03-16 PROCEDURE — 25010000002 CEFAZOLIN PER 500 MG: Performed by: INTERNAL MEDICINE

## 2023-03-16 PROCEDURE — 25010000002 FENTANYL CITRATE (PF) 50 MCG/ML SOLUTION: Performed by: INTERNAL MEDICINE

## 2023-03-16 PROCEDURE — 85027 COMPLETE CBC AUTOMATED: CPT | Performed by: INTERNAL MEDICINE

## 2023-03-16 PROCEDURE — 02H63JZ INSERTION OF PACEMAKER LEAD INTO RIGHT ATRIUM, PERCUTANEOUS APPROACH: ICD-10-PCS | Performed by: INTERNAL MEDICINE

## 2023-03-16 DEVICE — LD PM TENDRIL STS 6F46CM 2088TC46: Type: IMPLANTABLE DEVICE | Status: FUNCTIONAL

## 2023-03-16 DEVICE — LD PM TENDRIL STS 6F58CM 2088TC58: Type: IMPLANTABLE DEVICE | Status: FUNCTIONAL

## 2023-03-16 DEVICE — GEN PM ASSURITY MRI DR RF PM2272: Type: IMPLANTABLE DEVICE | Status: FUNCTIONAL

## 2023-03-16 RX ORDER — LIDOCAINE HYDROCHLORIDE 20 MG/ML
INJECTION, SOLUTION INFILTRATION; PERINEURAL
Status: DISCONTINUED | OUTPATIENT
Start: 2023-03-16 | End: 2023-03-16 | Stop reason: HOSPADM

## 2023-03-16 RX ORDER — BUPIVACAINE HCL/0.9 % NACL/PF 0.1 %
2 PLASTIC BAG, INJECTION (ML) EPIDURAL ONCE
Status: DISCONTINUED | OUTPATIENT
Start: 2023-03-16 | End: 2023-03-16 | Stop reason: HOSPADM

## 2023-03-16 RX ORDER — SODIUM CHLORIDE 9 MG/ML
40 INJECTION, SOLUTION INTRAVENOUS AS NEEDED
Status: DISCONTINUED | OUTPATIENT
Start: 2023-03-16 | End: 2023-03-16 | Stop reason: HOSPADM

## 2023-03-16 RX ORDER — SODIUM CHLORIDE 0.9 % (FLUSH) 0.9 %
3 SYRINGE (ML) INJECTION EVERY 12 HOURS SCHEDULED
Status: DISCONTINUED | OUTPATIENT
Start: 2023-03-16 | End: 2023-03-16 | Stop reason: HOSPADM

## 2023-03-16 RX ORDER — SODIUM CHLORIDE 0.9 % (FLUSH) 0.9 %
10 SYRINGE (ML) INJECTION AS NEEDED
Status: DISCONTINUED | OUTPATIENT
Start: 2023-03-16 | End: 2023-03-17 | Stop reason: HOSPADM

## 2023-03-16 RX ORDER — SODIUM CHLORIDE 9 MG/ML
40 INJECTION, SOLUTION INTRAVENOUS AS NEEDED
Status: DISCONTINUED | OUTPATIENT
Start: 2023-03-16 | End: 2023-03-17 | Stop reason: HOSPADM

## 2023-03-16 RX ORDER — SODIUM CHLORIDE 0.9 % (FLUSH) 0.9 %
10 SYRINGE (ML) INJECTION EVERY 12 HOURS SCHEDULED
Status: DISCONTINUED | OUTPATIENT
Start: 2023-03-16 | End: 2023-03-17 | Stop reason: HOSPADM

## 2023-03-16 RX ORDER — FENTANYL CITRATE 50 UG/ML
INJECTION, SOLUTION INTRAMUSCULAR; INTRAVENOUS
Status: DISCONTINUED | OUTPATIENT
Start: 2023-03-16 | End: 2023-03-16 | Stop reason: HOSPADM

## 2023-03-16 RX ORDER — HYDROCODONE BITARTRATE AND ACETAMINOPHEN 5; 325 MG/1; MG/1
1 TABLET ORAL EVERY 4 HOURS PRN
Status: DISCONTINUED | OUTPATIENT
Start: 2023-03-16 | End: 2023-03-17 | Stop reason: HOSPADM

## 2023-03-16 RX ORDER — SODIUM CHLORIDE 0.9 % (FLUSH) 0.9 %
10 SYRINGE (ML) INJECTION AS NEEDED
Status: DISCONTINUED | OUTPATIENT
Start: 2023-03-16 | End: 2023-03-16 | Stop reason: HOSPADM

## 2023-03-16 RX ORDER — MIDAZOLAM HYDROCHLORIDE 1 MG/ML
INJECTION INTRAMUSCULAR; INTRAVENOUS
Status: DISCONTINUED | OUTPATIENT
Start: 2023-03-16 | End: 2023-03-16 | Stop reason: HOSPADM

## 2023-03-16 RX ADMIN — HYDROCODONE BITARTRATE AND ACETAMINOPHEN 1 TABLET: 5; 325 TABLET ORAL at 19:34

## 2023-03-16 RX ADMIN — Medication 10 ML: at 20:35

## 2023-03-16 RX ADMIN — DOCUSATE SODIUM 50 MG AND SENNOSIDES 8.6 MG 2 TABLET: 8.6; 5 TABLET, FILM COATED ORAL at 19:34

## 2023-03-16 RX ADMIN — IOPAMIDOL 90 ML: 755 INJECTION, SOLUTION INTRAVENOUS at 10:08

## 2023-03-16 NOTE — NURSING NOTE
Pace maker incision is without redness or drainage. Patient is sleeping  quietly in his bed without  C/o pain oor facial grimaces

## 2023-03-16 NOTE — SIGNIFICANT NOTE
Nursing called after pacemaker placement and informed me that Dr. Martinez had reported patient was stable from Cardiology perspective to transfer to regular floor. Transfer orders placed.       This document has been electronically signed by Dennis Colbert MD on March 16, 2023 15:51 CDT

## 2023-03-16 NOTE — PROGRESS NOTES
FAMILY MEDICINE RESIDENCY SERVICE  DAILY PROGRESS NOTE    NAME: Ayan Shipman  : 1960  MRN: [id number]      LOS: 2 days     PROVIDER OF SERVICE: Dennis Colbert MD    Chief Complaint: Sinus pause    Subjective:     Interval History:  History taken from: patient chart  No New complaints. No acute events overnight. Plan for pacemaker today.   Reviewing patient's echo, patient has mild dilation of aortic root and moderate dilation of ascending aorta.     Review of Systems:   Review of Systems   Constitutional: Negative for fever.   Respiratory: Negative for shortness of breath.    Cardiovascular: Negative for chest pain.   Gastrointestinal: Negative for abdominal pain, diarrhea, nausea and vomiting.   Genitourinary: Negative for dysuria.   Neurological: Negative for dizziness and light-headedness.       Objective:     Vital Signs  Temp:  [97 °F (36.1 °C)-98.7 °F (37.1 °C)] 98.1 °F (36.7 °C)  Heart Rate:  [59-84] 60  Resp:  [16] 16  BP: (107-143)/(60-84) 124/70   Body mass index is 28.61 kg/m².    Physical Exam  Physical Exam  Vitals reviewed.   Constitutional:       General: He is not in acute distress.  HENT:      Head: Normocephalic and atraumatic.   Cardiovascular:      Rate and Rhythm: Normal rate and regular rhythm.      Pulses: Normal pulses.   Pulmonary:      Effort: Pulmonary effort is normal. No respiratory distress.      Breath sounds: No rhonchi or rales.   Abdominal:      Palpations: Abdomen is soft.      Tenderness: There is no abdominal tenderness.   Musculoskeletal:      Right lower leg: No edema.      Left lower leg: No edema.   Skin:     General: Skin is warm.   Neurological:      Mental Status: He is alert. Mental status is at baseline.   Psychiatric:         Speech: Speech normal.         Behavior: Behavior is cooperative.         Scheduled Meds   famotidine, 40 mg, Oral, Daily  senna-docusate sodium, 2 tablet, Oral, BID  sodium chloride, 10 mL, Intravenous, Q12H       PRN Meds    •  acetaminophen **OR** [DISCONTINUED] acetaminophen **OR** acetaminophen  •  atropine  •  senna-docusate sodium **AND** polyethylene glycol **AND** bisacodyl **AND** bisacodyl  •  ondansetron **OR** ondansetron  •  [COMPLETED] Insert Peripheral IV **AND** sodium chloride  •  sodium chloride  •  sodium chloride      Diagnostic Data    Lab Results (last 24 hours)     Procedure Component Value Units Date/Time    Comprehensive Metabolic Panel [[id number]]  (Abnormal) Collected: 03/16/23 0417    Specimen: Blood Updated: 03/16/23 0501     Glucose 105 mg/dL      BUN 13 mg/dL      Creatinine 0.88 mg/dL      Sodium 140 mmol/L      Potassium 4.7 mmol/L      Comment: Slight hemolysis detected by analyzer. Results may be affected.        Chloride 106 mmol/L      CO2 25.0 mmol/L      Calcium 8.6 mg/dL      Total Protein 6.3 g/dL      Albumin 3.9 g/dL      ALT (SGPT) 17 U/L      AST (SGOT) 21 U/L      Comment: Slight hemolysis detected by analyzer. Results may be affected.        Alkaline Phosphatase 40 U/L      Total Bilirubin 0.6 mg/dL      Globulin 2.4 gm/dL      A/G Ratio 1.6 g/dL      BUN/Creatinine Ratio 14.8     Anion Gap 9.0 mmol/L      eGFR 96.0 mL/min/1.73     Narrative:      GFR Normal >60  Chronic Kidney Disease <60  Kidney Failure <15      CBC & Differential [[id number]]  (Normal) Collected: 03/16/23 0417    Specimen: Blood Updated: 03/16/23 0428    Narrative:      The following orders were created for panel order CBC & Differential.  Procedure                               Abnormality         Status                     ---------                               -----------         ------                     CBC Auto Differential[[id number]]        Normal              Final result                 Please view results for these tests on the individual orders.    CBC Auto Differential [[id number]]  (Normal) Collected: 03/16/23 0417    Specimen: Blood Updated: 03/16/23 0428     WBC 6.95 10*3/mm3      RBC 4.56 10*6/mm3       Hemoglobin 14.8 g/dL      Hematocrit 42.3 %      MCV 92.8 fL      MCH 32.5 pg      MCHC 35.0 g/dL      RDW 13.1 %      RDW-SD 44.3 fl      MPV 9.1 fL      Platelets 199 10*3/mm3      Neutrophil % 57.8 %      Lymphocyte % 30.1 %      Monocyte % 7.9 %      Eosinophil % 3.0 %      Basophil % 0.9 %      Immature Grans % 0.3 %      Neutrophils, Absolute 4.02 10*3/mm3      Lymphocytes, Absolute 2.09 10*3/mm3      Monocytes, Absolute 0.55 10*3/mm3      Eosinophils, Absolute 0.21 10*3/mm3      Basophils, Absolute 0.06 10*3/mm3      Immature Grans, Absolute 0.02 10*3/mm3      nRBC 0.0 /100 WBC           CT Chest Without Contrast Diagnostic    Result Date: 3/15/2023  Conclusion: Linear atelectasis or scarring lower lobes. Ascending aorta dilated up to 4.8 cm. Coronary artery calcifications. Minimal cardiomegaly. Moderate distention of the stomach with ingested contents. At least moderate amount retained feces in the colon. Punctate nonobstructive renal calculi. 1.3 cm cyst right lobe of the liver. [id number] Electronically signed by:  Johnathon Musa MD  3/15/2023 5:32 PM CDT Workstation: [id number]        I reviewed the patient's new clinical results.    Assessment/Plan:     Active and Resolved Problems  Active Hospital Problems    Diagnosis  POA   • **Sinus pause [I45.5]  Yes   • AV block, Mobitz II [I44.1]  Unknown      Resolved Hospital Problems   No resolved problems to display.     Sinus pause  - Continuous cardiac monitoring  - Pacer pads in place with pacemaker device in room.  - Atropine 0.4 mg 2 doses in room with patient.  - Cardiology consulted, appeciate recs  - plan for pacemaker today  - CTA aorta to evaluate aorta dilation     DVT prophylaxis:  Mechanical DVT prophylaxis orders are present.     Code status is   Code Status and Medical Interventions:   Ordered at: 03/14/23 1812     Level Of Support Discussed With:    Patient     Code Status (Patient has no pulse and is not breathing):    CPR (Attempt to Resuscitate)      Medical Interventions (Patient has pulse or is breathing):    Full Support       Plan for disposition: Home in 1-2 days    Time: 30 minutes      This document has been electronically signed by Dennis Colbert MD on March 16, 2023 06:27 CDT

## 2023-03-16 NOTE — NURSING NOTE
Writer called cath ab staff to let them know pacemaker was not charted and requested to be charted.

## 2023-03-17 ENCOUNTER — READMISSION MANAGEMENT (OUTPATIENT)
Dept: CALL CENTER | Facility: HOSPITAL | Age: 63
End: 2023-03-17
Payer: COMMERCIAL

## 2023-03-17 VITALS
HEIGHT: 71 IN | HEART RATE: 66 BPM | WEIGHT: 205.3 LBS | DIASTOLIC BLOOD PRESSURE: 58 MMHG | RESPIRATION RATE: 20 BRPM | SYSTOLIC BLOOD PRESSURE: 96 MMHG | TEMPERATURE: 98 F | OXYGEN SATURATION: 93 % | BODY MASS INDEX: 28.74 KG/M2

## 2023-03-17 PROBLEM — I44.1 AV BLOCK, MOBITZ II: Status: RESOLVED | Noted: 2023-03-14 | Resolved: 2023-03-17

## 2023-03-17 PROBLEM — Z95.0 S/P PLACEMENT OF CARDIAC PACEMAKER: Status: ACTIVE | Noted: 2023-03-17

## 2023-03-17 PROBLEM — I45.5 SINUS PAUSE: Status: RESOLVED | Noted: 2023-03-14 | Resolved: 2023-03-17

## 2023-03-17 LAB
ALBUMIN SERPL-MCNC: 3.9 G/DL (ref 3.5–5.2)
ALBUMIN/GLOB SERPL: 1.6 G/DL
ALP SERPL-CCNC: 40 U/L (ref 39–117)
ALT SERPL W P-5'-P-CCNC: 14 U/L (ref 1–41)
ANION GAP SERPL CALCULATED.3IONS-SCNC: 10 MMOL/L (ref 5–15)
AST SERPL-CCNC: 18 U/L (ref 1–40)
BASOPHILS # BLD AUTO: 0.03 10*3/MM3 (ref 0–0.2)
BASOPHILS NFR BLD AUTO: 0.4 % (ref 0–1.5)
BILIRUB SERPL-MCNC: 0.8 MG/DL (ref 0–1.2)
BUN SERPL-MCNC: 12 MG/DL (ref 8–23)
BUN/CREAT SERPL: 15.4 (ref 7–25)
CALCIUM SPEC-SCNC: 8.7 MG/DL (ref 8.6–10.5)
CHLORIDE SERPL-SCNC: 105 MMOL/L (ref 98–107)
CO2 SERPL-SCNC: 23 MMOL/L (ref 22–29)
CREAT SERPL-MCNC: 0.78 MG/DL (ref 0.76–1.27)
DEPRECATED RDW RBC AUTO: 43.7 FL (ref 37–54)
EGFRCR SERPLBLD CKD-EPI 2021: 99.6 ML/MIN/1.73
EOSINOPHIL # BLD AUTO: 0.13 10*3/MM3 (ref 0–0.4)
EOSINOPHIL NFR BLD AUTO: 1.6 % (ref 0.3–6.2)
ERYTHROCYTE [DISTWIDTH] IN BLOOD BY AUTOMATED COUNT: 12.9 % (ref 12.3–15.4)
GLOBULIN UR ELPH-MCNC: 2.5 GM/DL
GLUCOSE SERPL-MCNC: 104 MG/DL (ref 65–99)
HCT VFR BLD AUTO: 43.5 % (ref 37.5–51)
HGB BLD-MCNC: 15.3 G/DL (ref 13–17.7)
IMM GRANULOCYTES # BLD AUTO: 0.01 10*3/MM3 (ref 0–0.05)
IMM GRANULOCYTES NFR BLD AUTO: 0.1 % (ref 0–0.5)
LYMPHOCYTES # BLD AUTO: 1.57 10*3/MM3 (ref 0.7–3.1)
LYMPHOCYTES NFR BLD AUTO: 19.6 % (ref 19.6–45.3)
MCH RBC QN AUTO: 32.5 PG (ref 26.6–33)
MCHC RBC AUTO-ENTMCNC: 35.2 G/DL (ref 31.5–35.7)
MCV RBC AUTO: 92.4 FL (ref 79–97)
MONOCYTES # BLD AUTO: 0.71 10*3/MM3 (ref 0.1–0.9)
MONOCYTES NFR BLD AUTO: 8.9 % (ref 5–12)
NEUTROPHILS NFR BLD AUTO: 5.54 10*3/MM3 (ref 1.7–7)
NEUTROPHILS NFR BLD AUTO: 69.4 % (ref 42.7–76)
NRBC BLD AUTO-RTO: 0 /100 WBC (ref 0–0.2)
PLATELET # BLD AUTO: 185 10*3/MM3 (ref 140–450)
PMV BLD AUTO: 8.9 FL (ref 6–12)
POTASSIUM SERPL-SCNC: 3.9 MMOL/L (ref 3.5–5.2)
PROT SERPL-MCNC: 6.4 G/DL (ref 6–8.5)
RBC # BLD AUTO: 4.71 10*6/MM3 (ref 4.14–5.8)
SODIUM SERPL-SCNC: 138 MMOL/L (ref 136–145)
WBC NRBC COR # BLD: 7.99 10*3/MM3 (ref 3.4–10.8)

## 2023-03-17 PROCEDURE — 85025 COMPLETE CBC W/AUTO DIFF WBC: CPT | Performed by: INTERNAL MEDICINE

## 2023-03-17 PROCEDURE — 99024 POSTOP FOLLOW-UP VISIT: CPT | Performed by: INTERNAL MEDICINE

## 2023-03-17 PROCEDURE — 80053 COMPREHEN METABOLIC PANEL: CPT | Performed by: INTERNAL MEDICINE

## 2023-03-17 PROCEDURE — 99239 HOSP IP/OBS DSCHRG MGMT >30: CPT | Performed by: STUDENT IN AN ORGANIZED HEALTH CARE EDUCATION/TRAINING PROGRAM

## 2023-03-17 RX ORDER — ROSUVASTATIN CALCIUM 5 MG/1
5 TABLET, COATED ORAL DAILY
Qty: 90 TABLET | Refills: 0 | Status: SHIPPED | OUTPATIENT
Start: 2023-03-17 | End: 2023-03-22 | Stop reason: SDUPTHER

## 2023-03-17 RX ORDER — METOPROLOL SUCCINATE 25 MG/1
25 TABLET, EXTENDED RELEASE ORAL
Qty: 30 TABLET | Refills: 0 | Status: SHIPPED | OUTPATIENT
Start: 2023-03-18

## 2023-03-17 RX ORDER — METOPROLOL SUCCINATE 25 MG/1
25 TABLET, EXTENDED RELEASE ORAL
Status: DISCONTINUED | OUTPATIENT
Start: 2023-03-17 | End: 2023-03-17 | Stop reason: HOSPADM

## 2023-03-17 RX ADMIN — METOPROLOL SUCCINATE 25 MG: 25 TABLET, FILM COATED, EXTENDED RELEASE ORAL at 08:11

## 2023-03-17 RX ADMIN — HYDROCODONE BITARTRATE AND ACETAMINOPHEN 1 TABLET: 5; 325 TABLET ORAL at 02:56

## 2023-03-17 RX ADMIN — FAMOTIDINE 40 MG: 40 TABLET, FILM COATED ORAL at 08:09

## 2023-03-17 NOTE — OUTREACH NOTE
Prep Survey    Flowsheet Row Responses   [facility] facility patient discharged from? [facility]   Is LACE score < 7 ? No   Eligibility [facility]   Date of Admission 03/14/23   Date of Discharge 03/17/23   Discharge Disposition Home or Self Care   Discharge diagnosis S/P placement of cardiac pacemaker   Does the patient have one of the following disease processes/diagnoses(primary or secondary)? General Surgery   Does the patient have Home health ordered? No   Is there a DME ordered? No   Prep survey completed? Yes          ELBERT ORO - Registered Nurse

## 2023-03-17 NOTE — DISCHARGE SUMMARY
"    DISCHARGE SUMMARY    PATIENT NAME: Ayan Shipman   PHYSICIAN: Dennis Colbert MD  : 1960  MRN: [id number]    ADMITTED: 3/14/2023     DISCHARGED: 3/17/2023      ADMITTING DIAGNOSIS:  Near syncope [R55]  Sinus pause [I45.5]      DISCHARGE, AND RESOLVED DIAGNOSES:  Active Hospital Problems    Diagnosis  POA   • **S/P placement of cardiac pacemaker [Z95.0]  Unknown      Resolved Hospital Problems    Diagnosis Date Resolved POA   • Sinus pause [I45.5] 2023 Yes   • AV block, Mobitz II [I44.1] 2023 Unknown         SERVICE: [facility]  Attending: Dr. Shiva Foster  Resident: Dennis Colbert MD    CONSULTS:   Consult Orders (all) (From admission, onward)     Start     Ordered    03/15/23 0904  Inpatient Cardiology Consult  Once        Specialty:  Cardiology  Provider:  Win Martinez MD    03/15/23 0904    03/15/23 0000  Inpatient Cardiology Consult  Once        Specialty:  Cardiology  Provider:  Christianne Serrano MD    23 1739                PROCEDURES:   Pacemaker Placement    HISTORY OF PRESENT ILLNESS:   Per the H&P written by , dated 3/14/23:  \"Ayan Shipman is a 62 y.o. male with a CMH of osteoarthritis who presents complaining of near syncopal episodes.  He reports that for the past few years he has been feeling like his heart will occasionally skip beats.  Up until this year the problem was very intermittent and did not appear to bother him.  About 1 month ago during 1 of these episodes of his heart skipping a beat he felt lightheadedness and felt like he was going to pass out although he did not.  He had a second episode again in early March.  Again without episode he felt the missed beat, felt lightheaded, took a couple of steps backwards, and felt like he was going to pass out although he did not.  He subsequently saw his PCP who referred him to cardiology.  He had a Holter monitor placed.  He got a call yesterday from the monitoring " "company advising him that he was experiencing 3 to 4-second episodes of sinus pause.  They recommended that he come to the ER for evaluation and consideration of pacemaker.     This is an otherwise healthy male who takes no prescribed medications on a consistent basis.     His family history is significant for his mother who needed a pacemaker.\"    DIAGNOSTIC DATA:   Lab Results (last 72 hours)     Procedure Component Value Units Date/Time    Comprehensive Metabolic Panel [[id number]]  (Abnormal) Collected: 03/17/23 0636    Specimen: Blood Updated: 03/17/23 0701     Glucose 104 mg/dL      BUN 12 mg/dL      Creatinine 0.78 mg/dL      Sodium 138 mmol/L      Potassium 3.9 mmol/L      Chloride 105 mmol/L      CO2 23.0 mmol/L      Calcium 8.7 mg/dL      Total Protein 6.4 g/dL      Albumin 3.9 g/dL      ALT (SGPT) 14 U/L      AST (SGOT) 18 U/L      Alkaline Phosphatase 40 U/L      Total Bilirubin 0.8 mg/dL      Globulin 2.5 gm/dL      A/G Ratio 1.6 g/dL      BUN/Creatinine Ratio 15.4     Anion Gap 10.0 mmol/L      eGFR 99.6 mL/min/1.73     Narrative:      GFR Normal >60  Chronic Kidney Disease <60  Kidney Failure <15      CBC & Differential [[id number]]  (Normal) Collected: 03/17/23 0636    Specimen: Blood Updated: 03/17/23 0642    Narrative:      The following orders were created for panel order CBC & Differential.  Procedure                               Abnormality         Status                     ---------                               -----------         ------                     CBC Auto Differential[[id number]]        Normal              Final result                 Please view results for these tests on the individual orders.    CBC Auto Differential [[id number]]  (Normal) Collected: 03/17/23 0636    Specimen: Blood Updated: 03/17/23 0642     WBC 7.99 10*3/mm3      RBC 4.71 10*6/mm3      Hemoglobin 15.3 g/dL      Hematocrit 43.5 %      MCV 92.4 fL      MCH 32.5 pg      MCHC 35.2 g/dL      RDW 12.9 %      RDW-SD " 43.7 fl      MPV 8.9 fL      Platelets 185 10*3/mm3      Neutrophil % 69.4 %      Lymphocyte % 19.6 %      Monocyte % 8.9 %      Eosinophil % 1.6 %      Basophil % 0.4 %      Immature Grans % 0.1 %      Neutrophils, Absolute 5.54 10*3/mm3      Lymphocytes, Absolute 1.57 10*3/mm3      Monocytes, Absolute 0.71 10*3/mm3      Eosinophils, Absolute 0.13 10*3/mm3      Basophils, Absolute 0.03 10*3/mm3      Immature Grans, Absolute 0.01 10*3/mm3      nRBC 0.0 /100 WBC     Basic Metabolic Panel [[id number]]  (Abnormal) Collected: 03/16/23 0857    Specimen: Blood Updated: 03/16/23 0927     Glucose 120 mg/dL      BUN 12 mg/dL      Creatinine 0.88 mg/dL      Sodium 139 mmol/L      Potassium 3.9 mmol/L      Chloride 105 mmol/L      CO2 23.0 mmol/L      Calcium 8.7 mg/dL      BUN/Creatinine Ratio 13.6     Anion Gap 11.0 mmol/L      eGFR 96.0 mL/min/1.73     Narrative:      GFR Normal >60  Chronic Kidney Disease <60  Kidney Failure <15      Protime-INR [[id number]]  (Normal) Collected: 03/16/23 0857    Specimen: Blood Updated: 03/16/23 0927     Protime 14.1 Seconds      INR 1.10    Narrative:      Therapeutic range for most indications is 2.0-3.0 INR,  or 2.5-3.5 for mechanical heart valves.    CBC (No Diff) [[id number]]  (Normal) Collected: 03/16/23 0857    Specimen: Blood Updated: 03/16/23 0911     WBC 5.81 10*3/mm3      RBC 4.89 10*6/mm3      Hemoglobin 15.5 g/dL      Hematocrit 44.9 %      MCV 91.8 fL      MCH 31.7 pg      MCHC 34.5 g/dL      RDW 13.0 %      RDW-SD 43.8 fl      MPV 9.1 fL      Platelets 207 10*3/mm3     Comprehensive Metabolic Panel [[id number]]  (Abnormal) Collected: 03/16/23 0417    Specimen: Blood Updated: 03/16/23 0501     Glucose 105 mg/dL      BUN 13 mg/dL      Creatinine 0.88 mg/dL      Sodium 140 mmol/L      Potassium 4.7 mmol/L      Comment: Slight hemolysis detected by analyzer. Results may be affected.        Chloride 106 mmol/L      CO2 25.0 mmol/L      Calcium 8.6 mg/dL      Total Protein 6.3 g/dL       Albumin 3.9 g/dL      ALT (SGPT) 17 U/L      AST (SGOT) 21 U/L      Comment: Slight hemolysis detected by analyzer. Results may be affected.        Alkaline Phosphatase 40 U/L      Total Bilirubin 0.6 mg/dL      Globulin 2.4 gm/dL      A/G Ratio 1.6 g/dL      BUN/Creatinine Ratio 14.8     Anion Gap 9.0 mmol/L      eGFR 96.0 mL/min/1.73     Narrative:      GFR Normal >60  Chronic Kidney Disease <60  Kidney Failure <15      CBC & Differential [[id number]]  (Normal) Collected: 03/16/23 0417    Specimen: Blood Updated: 03/16/23 0428    Narrative:      The following orders were created for panel order CBC & Differential.  Procedure                               Abnormality         Status                     ---------                               -----------         ------                     CBC Auto Differential[[id number]]        Normal              Final result                 Please view results for these tests on the individual orders.    CBC Auto Differential [[id number]]  (Normal) Collected: 03/16/23 0417    Specimen: Blood Updated: 03/16/23 0428     WBC 6.95 10*3/mm3      RBC 4.56 10*6/mm3      Hemoglobin 14.8 g/dL      Hematocrit 42.3 %      MCV 92.8 fL      MCH 32.5 pg      MCHC 35.0 g/dL      RDW 13.1 %      RDW-SD 44.3 fl      MPV 9.1 fL      Platelets 199 10*3/mm3      Neutrophil % 57.8 %      Lymphocyte % 30.1 %      Monocyte % 7.9 %      Eosinophil % 3.0 %      Basophil % 0.9 %      Immature Grans % 0.3 %      Neutrophils, Absolute 4.02 10*3/mm3      Lymphocytes, Absolute 2.09 10*3/mm3      Monocytes, Absolute 0.55 10*3/mm3      Eosinophils, Absolute 0.21 10*3/mm3      Basophils, Absolute 0.06 10*3/mm3      Immature Grans, Absolute 0.02 10*3/mm3      nRBC 0.0 /100 WBC     Comprehensive Metabolic Panel [[id number]]  (Abnormal) Collected: 03/15/23 0329    Specimen: Blood Updated: 03/15/23 0429     Glucose 112 mg/dL      BUN 14 mg/dL      Creatinine 0.99 mg/dL      Sodium 141 mmol/L      Potassium 3.9 mmol/L       Comment: Slight hemolysis detected by analyzer. Results may be affected.        Chloride 107 mmol/L      CO2 26.0 mmol/L      Calcium 8.6 mg/dL      Total Protein 6.4 g/dL      Albumin 4.0 g/dL      ALT (SGPT) 18 U/L      AST (SGOT) 20 U/L      Alkaline Phosphatase 40 U/L      Total Bilirubin 0.3 mg/dL      Globulin 2.4 gm/dL      A/G Ratio 1.7 g/dL      BUN/Creatinine Ratio 14.1     Anion Gap 8.0 mmol/L      eGFR 85.1 mL/min/1.73     Narrative:      GFR Normal >60  Chronic Kidney Disease <60  Kidney Failure <15      CBC & Differential [[id number]]  (Normal) Collected: 03/15/23 0329    Specimen: Blood Updated: 03/15/23 0351    Narrative:      The following orders were created for panel order CBC & Differential.  Procedure                               Abnormality         Status                     ---------                               -----------         ------                     CBC Auto Differential[[id number]]        Normal              Final result                 Please view results for these tests on the individual orders.    CBC Auto Differential [[id number]]  (Normal) Collected: 03/15/23 0329    Specimen: Blood Updated: 03/15/23 0351     WBC 6.54 10*3/mm3      RBC 4.65 10*6/mm3      Hemoglobin 14.8 g/dL      Hematocrit 43.5 %      MCV 93.5 fL      MCH 31.8 pg      MCHC 34.0 g/dL      RDW 13.2 %      RDW-SD 45.1 fl      MPV 9.2 fL      Platelets 212 10*3/mm3      Neutrophil % 54.2 %      Lymphocyte % 32.6 %      Monocyte % 8.6 %      Eosinophil % 3.5 %      Basophil % 0.8 %      Immature Grans % 0.3 %      Neutrophils, Absolute 3.55 10*3/mm3      Lymphocytes, Absolute 2.13 10*3/mm3      Monocytes, Absolute 0.56 10*3/mm3      Eosinophils, Absolute 0.23 10*3/mm3      Basophils, Absolute 0.05 10*3/mm3      Immature Grans, Absolute 0.02 10*3/mm3      nRBC 0.0 /100 WBC            CT Chest Without Contrast Diagnostic    Result Date: 3/15/2023  Conclusion: Linear atelectasis or scarring lower lobes. Ascending  aorta dilated up to 4.8 cm. Coronary artery calcifications. Minimal cardiomegaly. Moderate distention of the stomach with ingested contents. At least moderate amount retained feces in the colon. Punctate nonobstructive renal calculi. 1.3 cm cyst right lobe of the liver. [id number] Electronically signed by:  Johnathon Musa MD  3/15/2023 5:32 PM CDT Workstation: [id number]    CT Angiogram Chest    Result Date: 3/16/2023  Conclusion: Aortic root dilated up to 4.5 cm. Proximal ascending aorta dilated up to 4.8 cm. Mid and distal ascending aorta dilated up to 4.4 cm. No aortic dissection. Coronary artery calcifications. Minimal cardiomegaly. [id number] Electronically signed by:  Johnathon Musa MD  3/16/2023 4:53 PM CDT Workstation: [id number]    XR Chest 1 View    Result Date: 3/16/2023  CONCLUSION: The lungs are clear of an acute process. Left-sided pacemaker with leads projected over the right atrium and right ventricle. No pneumothorax. Stable cardiomegaly. [id number] Electronically signed by:  Johnathon Musa MD  3/16/2023 4:45 PM CDT Workstation: [id number]       HOSPITAL COURSE:  Sinus Pause/AV Block/S/p Pacemaker Placement  Patient admitted for sinus pause. Dr. Martinez assessed patient a determined he had recurrent symptomatic high-grade AV block and recommended pacemaker placement. Patient placed on continuous cardiac monitoring with pacer pads and atropine in room with patient prior to procedure. Patient tolerated pacemaker placement procedure well without any complications. Patient was monitored overnight with VSS and no acute cardiac events or new onset chest pain. Patient was started on Metoprolol succinate XL 25mg daily and Crestor 5mg daily. Patient instructed to follow up with Pacemaker clinic for wound check in 1 week and to follow up with Dr. Martinez in 1 month.     Dilated Ascending Aorta  Cardiac workup during hospitalization included Echocardiogram that showed EF 61-65% and moderately dilated proximal aorta. Patient then underwent  CTA Chest that showed Aortic root dilated up to 4.5cm, Proximal ascending aorta dilated up to 4.8cm, and Distal ascending aorta dilated up to 4.4cm. Negative for aortic dissection. CT Surgery evaluated patient in hospital and recommended outpatient follow up and monitoring.     DISCHARGE CONDITION:   Stable    DISPOSITION:  Home or Self Care    DISCHARGE MEDICATIONS     Discharge Medications      New Medications      Instructions Start Date   metoprolol succinate XL 25 MG 24 hr tablet  Commonly known as: TOPROL-XL   25 mg, Oral, Every 24 Hours Scheduled   Start Date: March 18, 2023     rosuvastatin 5 MG tablet  Commonly known as: Crestor   5 mg, Oral, Daily         Continue These Medications      Instructions Start Date   acetaminophen 500 MG tablet  Commonly known as: TYLENOL   500 mg, Oral, Every 6 Hours PRN      meloxicam 15 MG tablet  Commonly known as: MOBIC   15 mg, Oral, Daily      VITAMIN C PO   Oral      VITAMIN D BOOSTER PO   Oral             INSTRUCTIONS:  Activity:   Activity Instructions     Activity as Tolerated          Diet:   Diet Instructions     Diet: Cardiac Diets; Healthy Heart (2-3 Na+); Texture: Regular Texture (IDDSI 7); Fluid Consistency: Thin (IDDSI 0)      Discharge Diet: Cardiac Diets    Cardiac Diet: Healthy Heart (2-3 Na+)    Texture: Regular Texture (IDDSI 7)    Fluid Consistency: Thin (IDDSI 0)          FOLLOW UP:   Additional Instructions for the Follow-ups that You Need to Schedule     Discharge Follow-up with Specialty: 6 months tms ct chest   As directed      Specialty: 6 months tms ct chest         CT Chest Without Contrast Diagnostic   Sep 18, 2023      Exam reason: taa            Follow-up Information     Mo Marques MD Follow up in 1 week(s).    Specialty: Internal Medicine  Why: Wednesday March 22, 2023 @ 3:30  Contact information:  [address] [place] [address] [address] [place] [address] [phone]             Mic Silverman MD .    Specialties: Cardiothoracic  Surgery, Vascular Surgery, Thoracic Surgery  Why: office will call with appointment  Contact information:  [address] [place] [address]   [place] [address] [phone]                       Win Martinez MD Follow up in 1 Month  Cardiology  Schedule an appointment for a visit in 1 month  [address] [place] [address] [address]  [facility] [address] [place] [address]    PENDING TEST RESULTS AT DISCHARGE      Time: >30 minutes were spent in discharge planning, medication reconciliation and coordination of care for this patient.    Dr. Shiva Foster is the attending at time of discharge, He is aware of the patient's status and agrees with the above discharge summary.      This document has been electronically signed by Dennis Colbert MD on March 17, 2023 14:17 CDT

## 2023-03-17 NOTE — PROGRESS NOTES
F[facility]ILY [facility]ENCY SERVICE  DAILY PROGRESS NOTE    NAME: Ayan Shipman  : 1960  MRN: [id number]      LOS: 3 days     PROVIDER OF SERVICE: Dennis Colbert MD    Chief Complaint: S/P placement of cardiac pacemaker    Subjective:     Interval History:  History taken from: patient chart  S/p pacemaker placement. No acute events overnight. Patient feeling well.    Review of Systems:   Review of Systems   Constitutional: Negative for fever.   Respiratory: Negative for shortness of breath.    Cardiovascular: Negative for chest pain and leg swelling.   Gastrointestinal: Negative for abdominal pain, constipation, diarrhea, nausea and vomiting.   Genitourinary: Negative for dysuria.   Skin: Negative for rash.   Neurological: Negative for dizziness and light-headedness.       Objective:     Vital Signs  Temp:  [98.1 °F (36.7 °C)] 98.1 °F (36.7 °C)  Heart Rate:  [65-85] 72  Resp:  [16] 16  BP: ()/(61-75) 104/65  Flow (L/min):  [2] 2   Body mass index is 28.65 kg/m².    Physical Exam  Constitutional:       Appearance: He is not ill-appearing.   HENT:      Head: Normocephalic and atraumatic.   Cardiovascular:      Rate and Rhythm: Normal rate and regular rhythm.      Pulses: Normal pulses.   Pulmonary:      Effort: Pulmonary effort is normal. No respiratory distress.   Chest:      Comments: Pacemaker in place in left upper chest, gauze in place c/d/i  Abdominal:      General: Bowel sounds are normal.      Palpations: Abdomen is soft.      Tenderness: There is no abdominal tenderness.   Musculoskeletal:      Right lower leg: No edema.      Left lower leg: No edema.   Skin:     General: Skin is warm.   Neurological:      Mental Status: He is alert. Mental status is at baseline.   Psychiatric:         Mood and Affect: Mood normal.         Behavior: Behavior normal.       Scheduled Meds   famotidine, 40 mg, Oral, Daily  metoprolol succinate XL, 25 mg, Oral, Q24H  senna-docusate sodium, 2 tablet,  Oral, BID  sodium chloride, 10 mL, Intravenous, Q12H  sodium chloride, 10 mL, Intravenous, Q12H       PRN Meds   •  acetaminophen **OR** [DISCONTINUED] acetaminophen **OR** acetaminophen  •  atropine  •  senna-docusate sodium **AND** polyethylene glycol **AND** bisacodyl **AND** bisacodyl  •  HYDROcodone-acetaminophen  •  ondansetron **OR** ondansetron  •  [COMPLETED] Insert Peripheral IV **AND** sodium chloride  •  sodium chloride  •  sodium chloride  •  sodium chloride  •  sodium chloride      Diagnostic Data    Lab Results (last 24 hours)     Procedure Component Value Units Date/Time    Comprehensive Metabolic Panel [[id number]]  (Abnormal) Collected: 03/17/23 0636    Specimen: Blood Updated: 03/17/23 0701     Glucose 104 mg/dL      BUN 12 mg/dL      Creatinine 0.78 mg/dL      Sodium 138 mmol/L      Potassium 3.9 mmol/L      Chloride 105 mmol/L      CO2 23.0 mmol/L      Calcium 8.7 mg/dL      Total Protein 6.4 g/dL      Albumin 3.9 g/dL      ALT (SGPT) 14 U/L      AST (SGOT) 18 U/L      Alkaline Phosphatase 40 U/L      Total Bilirubin 0.8 mg/dL      Globulin 2.5 gm/dL      A/G Ratio 1.6 g/dL      BUN/Creatinine Ratio 15.4     Anion Gap 10.0 mmol/L      eGFR 99.6 mL/min/1.73     Narrative:      GFR Normal >60  Chronic Kidney Disease <60  Kidney Failure <15      CBC & Differential [[id number]]  (Normal) Collected: 03/17/23 0636    Specimen: Blood Updated: 03/17/23 0642    Narrative:      The following orders were created for panel order CBC & Differential.  Procedure                               Abnormality         Status                     ---------                               -----------         ------                     CBC Auto Differential[[id number]]        Normal              Final result                 Please view results for these tests on the individual orders.    CBC Auto Differential [[id number]]  (Normal) Collected: 03/17/23 0636    Specimen: Blood Updated: 03/17/23 0642     WBC 7.99 10*3/mm3       RBC 4.71 10*6/mm3      Hemoglobin 15.3 g/dL      Hematocrit 43.5 %      MCV 92.4 fL      MCH 32.5 pg      MCHC 35.2 g/dL      RDW 12.9 %      RDW-SD 43.7 fl      MPV 8.9 fL      Platelets 185 10*3/mm3      Neutrophil % 69.4 %      Lymphocyte % 19.6 %      Monocyte % 8.9 %      Eosinophil % 1.6 %      Basophil % 0.4 %      Immature Grans % 0.1 %      Neutrophils, Absolute 5.54 10*3/mm3      Lymphocytes, Absolute 1.57 10*3/mm3      Monocytes, Absolute 0.71 10*3/mm3      Eosinophils, Absolute 0.13 10*3/mm3      Basophils, Absolute 0.03 10*3/mm3      Immature Grans, Absolute 0.01 10*3/mm3      nRBC 0.0 /100 WBC     Basic Metabolic Panel [[id number]]  (Abnormal) Collected: 03/16/23 0857    Specimen: Blood Updated: 03/16/23 0927     Glucose 120 mg/dL      BUN 12 mg/dL      Creatinine 0.88 mg/dL      Sodium 139 mmol/L      Potassium 3.9 mmol/L      Chloride 105 mmol/L      CO2 23.0 mmol/L      Calcium 8.7 mg/dL      BUN/Creatinine Ratio 13.6     Anion Gap 11.0 mmol/L      eGFR 96.0 mL/min/1.73     Narrative:      GFR Normal >60  Chronic Kidney Disease <60  Kidney Failure <15      Protime-INR [[id number]]  (Normal) Collected: 03/16/23 0857    Specimen: Blood Updated: 03/16/23 0927     Protime 14.1 Seconds      INR 1.10    Narrative:      Therapeutic range for most indications is 2.0-3.0 INR,  or 2.5-3.5 for mechanical heart valves.    CBC (No Diff) [[id number]]  (Normal) Collected: 03/16/23 0857    Specimen: Blood Updated: 03/16/23 0911     WBC 5.81 10*3/mm3      RBC 4.89 10*6/mm3      Hemoglobin 15.5 g/dL      Hematocrit 44.9 %      MCV 91.8 fL      MCH 31.7 pg      MCHC 34.5 g/dL      RDW 13.0 %      RDW-SD 43.8 fl      MPV 9.1 fL      Platelets 207 10*3/mm3           CT Chest Without Contrast Diagnostic    Result Date: 3/15/2023  Conclusion: Linear atelectasis or scarring lower lobes. Ascending aorta dilated up to 4.8 cm. Coronary artery calcifications. Minimal cardiomegaly. Moderate distention of the stomach with ingested  contents. At least moderate amount retained feces in the colon. Punctate nonobstructive renal calculi. 1.3 cm cyst right lobe of the liver. [id number] Electronically signed by:  Johnathon Musa MD  3/15/2023 5:32 PM CDT Workstation: [id number]    CT Angiogram Chest    Result Date: 3/16/2023  Conclusion: Aortic root dilated up to 4.5 cm. Proximal ascending aorta dilated up to 4.8 cm. Mid and distal ascending aorta dilated up to 4.4 cm. No aortic dissection. Coronary artery calcifications. Minimal cardiomegaly. [id number] Electronically signed by:  Johnathon Musa MD  3/16/2023 4:53 PM CDT Workstation: [id number]    XR Chest 1 View    Result Date: 3/16/2023  CONCLUSION: The lungs are clear of an acute process. Left-sided pacemaker with leads projected over the right atrium and right ventricle. No pneumothorax. Stable cardiomegaly. [id number] Electronically signed by:  Johnathon Musa MD  3/16/2023 4:45 PM CDT Workstation: [id number]        I reviewed the patient's new clinical results.    Assessment/Plan:     Active and Resolved Problems  Active Hospital Problems    Diagnosis  POA   • **S/P placement of cardiac pacemaker [Z95.0]  Unknown   • Sinus pause [I45.5]  Yes   • AV block, Mobitz II [I44.1]  Unknown      Resolved Hospital Problems   No resolved problems to display.     Sinus pause/Mobitz II AV block/S/p cardiac pacemaker  - Continuous cardiac monitoring  - Pacer pads in place with pacemaker device in room.  - Atropine 0.4 mg 2 doses in room with patient.  - Cardiology consulted, appeciate recs  - pacemaker placed (3/16/23)  - CTA aorta showed 4.8 cm dilation of ascending aorta  - CT surg consulted, recommend outpatient follow up    DVT prophylaxis:  Mechanical DVT prophylaxis orders are present.     Code status is   Code Status and Medical Interventions:   Ordered at: 03/14/23 1812     Level Of Support Discussed With:    Patient     Code Status (Patient has no pulse and is not breathing):    CPR (Attempt to Resuscitate)     Medical  Interventions (Patient has pulse or is breathing):    Full Support       Plan for disposition: Home in 1-2 days    Time: 30 minutes      This document has been electronically signed by Dennis Colbert MD on March 17, 2023 08:58 CDT

## 2023-03-17 NOTE — PROGRESS NOTES
LOS: 3 days   Patient Care Team:  Mo Marques MD as PCP - General (Internal Medicine)    Chief Complaint: 62 years old patient admitted for evaluation of recurrent syncope and monitor revealed intermittent high-grade AV block with associated symptom.  Patient underwent dual-chamber pacemaker implantations.  Site no evidence of hematoma. chest x-ray no acute pathology reported.  Pacemaker interrogated earlier today good sensing and pacing threshold.  Incidentally on echo found dilated ascending aorta 4.7 confirmed by CT scan is scheduled to be evaluated by Dr. Silverman as an outpatient.  Post hospital management discussed with the patient.  Do not lift left arm above the shoulder and driving for 30 days.  Keep it dry for 1 week.  Wound checkup with the pacer clinic in 1 week and see me in 1 month.       Review of Systems:     Constitution:  Denies any fatigue, fever or chills.  Positive for activity change    HENT:  Denies any headache, hearing impairment.    Eyes:  Denies any blurring of vision     Cardiovascular:  As per history of present illness.     Respiratory:  Denies any COPD, shortness of breath.       Gastrointestinal:  No nausea, vomiting, or melena.    Extremity: No edema, positive pulses    Objective     Current Facility-Administered Medications   Medication Dose Route Frequency Provider Last Rate Last Admin   • acetaminophen (TYLENOL) tablet 650 mg  650 mg Oral Q4H PRN Win Martinez MD        Or   • acetaminophen (TYLENOL) suppository 650 mg  650 mg Rectal Q4H PRN Win Martinez MD       • atropine injection 0.4 mg  0.4 mg Intravenous PRN Win Martinez MD       • sennosides-docusate (PERICOLACE) 8.6-50 MG per tablet 2 tablet  2 tablet Oral BID Win Martinez MD   2 tablet at 03/16/23 1934    And   • polyethylene glycol (MIRALAX) packet 17 g  17 g Oral Daily PRN Win Martinez MD        And   • bisacodyl (DULCOLAX) EC tablet 5 mg  5 mg Oral Daily PRN iWn Martinez MD        And   •  "bisacodyl (DULCOLAX) suppository 10 mg  10 mg Rectal Daily PRN Win Martinez MD   10 mg at 03/15/23 2137   • famotidine (PEPCID) tablet 40 mg  40 mg Oral Daily Win Martinez MD   40 mg at 03/17/23 0809   • HYDROcodone-acetaminophen (NORCO) 5-325 MG per tablet 1 tablet  1 tablet Oral Q4H PRN Win Martinez MD   1 tablet at 03/17/23 0256   • metoprolol succinate XL (TOPROL-XL) 24 hr tablet 25 mg  25 mg Oral Q24H Win Martinez MD   25 mg at 03/17/23 0811   • ondansetron (ZOFRAN) tablet 4 mg  4 mg Oral Q6H PRN Win Martinez MD        Or   • ondansetron (ZOFRAN) injection 4 mg  4 mg Intravenous Q6H PRN Win Martinez MD       • sodium chloride 0.9 % flush 10 mL  10 mL Intravenous PRN Win Martinez MD       • sodium chloride 0.9 % flush 10 mL  10 mL Intravenous Q12H Win Martinez MD   10 mL at 03/16/23 2035   • sodium chloride 0.9 % flush 10 mL  10 mL Intravenous PRN Win Martinez MD       • sodium chloride 0.9 % flush 10 mL  10 mL Intravenous Q12H Win Martinez MD   10 mL at 03/16/23 2035   • sodium chloride 0.9 % flush 10 mL  10 mL Intravenous PRN Win Martinez MD       • sodium chloride 0.9 % infusion 40 mL  40 mL Intravenous PRN Win Martinez MD       • sodium chloride 0.9 % infusion 40 mL  40 mL Intravenous PRN Win Martinez MD           Vital Sign Min/Max for last 24 hours  Temp  Min: 98 °F (36.7 °C)  Max: 98.1 °F (36.7 °C)   BP  Min: 95/61  Max: 120/71   Pulse  Min: 65  Max: 85   Resp  Min: 16  Max: 20   SpO2  Min: 90 %  Max: 98 %   Flow (L/min)  Min: 2  Max: 2   No data recorded     Flowsheet Rows    Flowsheet Row First Filed Value   Admission Height 180.3 cm (71\") Documented at 03/14/2023 1555   Admission Weight 93.4 kg (206 lb) Documented at 03/14/2023 1555          No intake or output data in the 24 hours ending 03/17/23 0952    Physical Exam:  Neck: Supple.  No JVD, no thyroid enlargement.  Chest: Air entry equal, normal respiration.  No rhonchi or creps.  Cardiovascular " system:  Regular rate and rhythm, no murmurs.  Abdomen: Soft, no tenderness, bowel sounds present  CNS: Alert, oriented to place and time.  No motor or sensory deficit.  Cranial nerves intact.  Musculoskeletal: No deformity of the back or spine.  Extremities:  No edema.  Pulses equal on both sides.     Results Review:   I reviewed the patient's new clinical results.  Lab Results   Component Value Date    WBC 7.99 03/17/2023    HGB 15.3 03/17/2023    HCT 43.5 03/17/2023    MCV 92.4 03/17/2023     03/17/2023     Lab Results   Component Value Date    GLUCOSE 104 (H) 03/17/2023    BUN 12 03/17/2023    CREATININE 0.78 03/17/2023    EGFRIFNONA 82 03/02/2021    BCR 15.4 03/17/2023    CO2 23.0 03/17/2023    CALCIUM 8.7 03/17/2023    ALBUMIN 3.9 03/17/2023    AST 18 03/17/2023    ALT 14 03/17/2023     Lab Results   Component Value Date    TSH 3.350 03/14/2023     Lab Results   Component Value Date    INR 1.10 03/16/2023    PROTIME 14.1 03/16/2023     No results found for: PTT    EKG:     Telemetry:    Ejection Fraction  No results found for: EF    Echo EF Estimated  No results found for: ECHOEFEST      Present on Admission:  • Sinus pause    Assessment & Plan   Symptomatic high-grade AV block    S/p pacemaker.  Interrogated good sensing and pacing threshold.  Post hospital management discussed with the patient    Ascending  aortic aneurysm scheduled to be evaluated by Dr. Silverman as an outpatient  Patient was started on low-dose metoprolol            This document has been electronically signed by Win Martinez MD on March 17, 2023 10:00 CDT      Win Martinez MD  03/17/23  09:52 CDT      Part of this note may be an electronic transcription/translation of spoken language to printed text using the Dragon Dictation system.

## 2023-03-20 ENCOUNTER — TRANSITIONAL CARE MANAGEMENT TELEPHONE ENCOUNTER (OUTPATIENT)
Dept: CALL CENTER | Facility: HOSPITAL | Age: 63
End: 2023-03-20
Payer: COMMERCIAL

## 2023-03-20 NOTE — OUTREACH NOTE
Call Center TCM Note    Flowsheet Row Responses   [facility] patient discharged from? [place]   Does the patient have one of the following disease processes/diagnoses(primary or secondary)? General Surgery   TCM attempt successful? No  [verbal - Wife]   Unsuccessful attempts Attempt 1   Call Status Left message   Comments HOSP DC FU appt 3/22/23 330pm.    Does the patient have an appointment with their PCP within 7 days of discharge? Yes          Renetta Duncan RN    3/20/2023, 14:03 CDT

## 2023-03-20 NOTE — OUTREACH NOTE
Call Center TCM Note    Flowsheet Row Responses   [facility] patient discharged from? [place]   Does the patient have one of the following disease processes/diagnoses(primary or secondary)? General Surgery   TCM attempt successful? Yes   Call start time 1509   Call end time 1514   Discharge diagnosis S/P placement of cardiac pacemaker   Meds reviewed with patient/caregiver? Yes   Is the patient having any side effects they believe may be caused by any medication additions or changes? No   Does the patient have all medications related to this admission filled (includes all antibiotics, pain medications, etc.) Yes   Is the patient taking all medications as directed (includes completed medication regime)? Yes   Comments HOSP DC FU appt 3/22/23 330pm.    Does the patient have an appointment with their PCP within 7 days of discharge? Yes   Has home health visited the patient within 72 hours of discharge? N/A   Psychosocial issues? No   Did the patient receive a copy of their discharge instructions? Yes   Nursing interventions Reviewed instructions with patient   What is the patient's perception of their health status since discharge? Improving   Nursing interventions Nurse provided patient education   Is the patient /caregiver able to teach back basic post-op care? Lifting as instructed by MD in discharge instructions, No tub bath, swimming, or hot tub until instructed by MD, Take showers only when approved by MD-sponge bathe until then   Is the patient/caregiver able to teach back signs and symptoms of incisional infection? Increased redness, swelling or pain at the incisonal site, Increased drainage or bleeding, Incisional warmth, Pus or odor from incision, Fever   Is the patient/caregiver able to teach back steps to recovery at home? Eat a well-balance diet, Rest and rebuild strength, gradually increase activity, Set small, achievable goals for return to baseline health   Is the patient/caregiver able to  teach back the hierarchy of who to call/visit for symptoms/problems? PCP, Specialist, Home health nurse, Urgent Care, ED, 911 Yes   TCM call completed? Yes   Wrap up additional comments Pt reports he is doing well at this time. No needs and reports his wife is also a RN   Call end time [phone]          Renetta Duncan RN    3/20/2023, 15:15 CDT

## 2023-03-20 NOTE — PAYOR COMM NOTE
"Maritza Elaine  [facility]  Case Management Extender  [phone] phone  [phone] fax      Auth# [id number]    Contreras Blevins (62 y.o. Male)     Date of Birth   1960    Social Security Number       Address   [address] [place] [address]    Home Phone   [phone]    MRN   [id number]       Scientologist   [facility]    Marital Status                               Admission Date   3/14/23    Admission Type   Emergency    Admitting Provider   Eduard Miller MD    Attending Provider       Department, Room/Bed   [facility] CRITICAL CARE STEPDOWN, 19/A       Discharge Date   3/17/2023    Discharge Disposition   Home or Self Care    Discharge Destination                               Attending Provider: (none)   Allergies: No Known Allergies    Isolation: None   Infection: None   Code Status: Prior    Ht: 180.3 cm (70.98\")   Wt: 93.1 kg (205 lb 4.8 oz)    Admission Cmt: None   Principal Problem: S/P placement of cardiac pacemaker [Z95.0]                 Active Insurance as of 3/14/2023     Primary Coverage     Payor Plan Insurance Group Employer/Plan Group    [employer] BLUE [employer]OSS [employer] BLUE [employer]OSS BLUE [employer] PPO [id number]     Payor Plan Address Payor Plan Phone Number Payor Plan Fax Number Effective Dates    [address] [phone]  1/1/2005 - None Entered    [place] [address]       Subscriber Name Subscriber Birth Date Member ID       CONTRERAS BLEVINS 1960 [id number]                 Emergency Contacts      (Rel.) Home Phone Work Phone Mobile Phone    Latasha Blevins (Spouse) [phone] -- [phone]               Discharge Summary      Dennis Colbert MD at 03/17/23 1232     Attestation signed by Shiva Foster MD at 03/18/23 1238    I have seen and evaluated the patient.  I have discussed the case with the resident. I have reviewed the notes, assessment and plan, and/or procedures performed " "by the resident. I concur with the resident’s documentation.     Physical Exam:  General: NAD.  CV: S1 and S2 normal. RRR.  Pulmonary: Clear to auscultation bilaterally, no wheezing, no rales.   Abdomen: Bowel sounds present and normal. Abdomen is soft, obese, and nontender   Extremities: No lower extremity edema.      Plan:   62 y.o. male with a CMH of osteoarthritis who presents complaining of near syncopal episodes was noted to have sinus pauses. EP on the case and Pt is s/p pacemaker placement. EP and Cardiology cleared the Pt for discharge.                    DISCHARGE SUMMARY    PATIENT NAME: Ayan Shipman   PHYSICIAN: Dennis Colbert MD  : 1960  MRN: [id number]    ADMITTED: 3/14/2023     DISCHARGED: 3/17/2023      ADMITTING DIAGNOSIS:  Near syncope [R55]  Sinus pause [I45.5]      DISCHARGE, AND RESOLVED DIAGNOSES:  Active Hospital Problems    Diagnosis  POA   • **S/P placement of cardiac pacemaker [Z95.0]  Unknown      Resolved Hospital Problems    Diagnosis Date Resolved POA   • Sinus pause [I45.5] 2023 Yes   • AV block, Mobitz II [I44.1] 2023 Unknown         SERVICE: Family Medicine Residency  Attending: Dr. Shiva Foster  Resident: Dennis Colbert MD    CONSULTS:   Consult Orders (all) (From admission, onward)     Start     Ordered    03/15/23 0904  Inpatient Cardiology Consult  Once        Specialty:  Cardiology  Provider:  Win Martinez MD    03/15/23 0904    03/15/23 0000  Inpatient Cardiology Consult  Once        Specialty:  Cardiology  Provider:  Christianne Serrano MD    23 1739                PROCEDURES:   Pacemaker Placement    HISTORY OF PRESENT ILLNESS:   Per the H&P written by , dated 3/14/23:  \"Ayan Shipman is a 62 y.o. male with a CMH of osteoarthritis who presents complaining of near syncopal episodes.  He reports that for the past few years he has been feeling like his heart will occasionally skip beats.  Up until this year the " "problem was very intermittent and did not appear to bother him.  About 1 month ago during 1 of these episodes of his heart skipping a beat he felt lightheadedness and felt like he was going to pass out although he did not.  He had a second episode again in early March.  Again without episode he felt the missed beat, felt lightheaded, took a couple of steps backwards, and felt like he was going to pass out although he did not.  He subsequently saw his PCP who referred him to cardiology.  He had a Holter monitor placed.  He got a call yesterday from the monitoring company advising him that he was experiencing 3 to 4-second episodes of sinus pause.  They recommended that he come to the ER for evaluation and consideration of pacemaker.     This is an otherwise healthy male who takes no prescribed medications on a consistent basis.     His family history is significant for his mother who needed a pacemaker.\"    DIAGNOSTIC DATA:   Lab Results (last 72 hours)     Procedure Component Value Units Date/Time    Comprehensive Metabolic Panel [[id number]]  (Abnormal) Collected: 03/17/23 0636    Specimen: Blood Updated: 03/17/23 0701     Glucose 104 mg/dL      BUN 12 mg/dL      Creatinine 0.78 mg/dL      Sodium 138 mmol/L      Potassium 3.9 mmol/L      Chloride 105 mmol/L      CO2 23.0 mmol/L      Calcium 8.7 mg/dL      Total Protein 6.4 g/dL      Albumin 3.9 g/dL      ALT (SGPT) 14 U/L      AST (SGOT) 18 U/L      Alkaline Phosphatase 40 U/L      Total Bilirubin 0.8 mg/dL      Globulin 2.5 gm/dL      A/G Ratio 1.6 g/dL      BUN/Creatinine Ratio 15.4     Anion Gap 10.0 mmol/L      eGFR 99.6 mL/min/1.73     Narrative:      GFR Normal >60  Chronic Kidney Disease <60  Kidney Failure <15      CBC & Differential [[id number]]  (Normal) Collected: 03/17/23 0636    Specimen: Blood Updated: 03/17/23 0642    Narrative:      The following orders were created for panel order CBC & Differential.  Procedure                               " Abnormality         Status                     ---------                               -----------         ------                     CBC Auto Differential[[id number]]        Normal              Final result                 Please view results for these tests on the individual orders.    CBC Auto Differential [[id number]]  (Normal) Collected: 03/17/23 0636    Specimen: Blood Updated: 03/17/23 0642     WBC 7.99 10*3/mm3      RBC 4.71 10*6/mm3      Hemoglobin 15.3 g/dL      Hematocrit 43.5 %      MCV 92.4 fL      MCH 32.5 pg      MCHC 35.2 g/dL      RDW 12.9 %      RDW-SD 43.7 fl      MPV 8.9 fL      Platelets 185 10*3/mm3      Neutrophil % 69.4 %      Lymphocyte % 19.6 %      Monocyte % 8.9 %      Eosinophil % 1.6 %      Basophil % 0.4 %      Immature Grans % 0.1 %      Neutrophils, Absolute 5.54 10*3/mm3      Lymphocytes, Absolute 1.57 10*3/mm3      Monocytes, Absolute 0.71 10*3/mm3      Eosinophils, Absolute 0.13 10*3/mm3      Basophils, Absolute 0.03 10*3/mm3      Immature Grans, Absolute 0.01 10*3/mm3      nRBC 0.0 /100 WBC     Basic Metabolic Panel [[id number]]  (Abnormal) Collected: 03/16/23 0857    Specimen: Blood Updated: 03/16/23 0927     Glucose 120 mg/dL      BUN 12 mg/dL      Creatinine 0.88 mg/dL      Sodium 139 mmol/L      Potassium 3.9 mmol/L      Chloride 105 mmol/L      CO2 23.0 mmol/L      Calcium 8.7 mg/dL      BUN/Creatinine Ratio 13.6     Anion Gap 11.0 mmol/L      eGFR 96.0 mL/min/1.73     Narrative:      GFR Normal >60  Chronic Kidney Disease <60  Kidney Failure <15      Protime-INR [[id number]]  (Normal) Collected: 03/16/23 0857    Specimen: Blood Updated: 03/16/23 0927     Protime 14.1 Seconds      INR 1.10    Narrative:      Therapeutic range for most indications is 2.0-3.0 INR,  or 2.5-3.5 for mechanical heart valves.    CBC (No Diff) [[id number]]  (Normal) Collected: 03/16/23 0857    Specimen: Blood Updated: 03/16/23 0911     WBC 5.81 10*3/mm3      RBC 4.89 10*6/mm3      Hemoglobin 15.5 g/dL       Hematocrit 44.9 %      MCV 91.8 fL      MCH 31.7 pg      MCHC 34.5 g/dL      RDW 13.0 %      RDW-SD 43.8 fl      MPV 9.1 fL      Platelets 207 10*3/mm3     Comprehensive Metabolic Panel [[id number]]  (Abnormal) Collected: 03/16/23 0417    Specimen: Blood Updated: 03/16/23 0501     Glucose 105 mg/dL      BUN 13 mg/dL      Creatinine 0.88 mg/dL      Sodium 140 mmol/L      Potassium 4.7 mmol/L      Comment: Slight hemolysis detected by analyzer. Results may be affected.        Chloride 106 mmol/L      CO2 25.0 mmol/L      Calcium 8.6 mg/dL      Total Protein 6.3 g/dL      Albumin 3.9 g/dL      ALT (SGPT) 17 U/L      AST (SGOT) 21 U/L      Comment: Slight hemolysis detected by analyzer. Results may be affected.        Alkaline Phosphatase 40 U/L      Total Bilirubin 0.6 mg/dL      Globulin 2.4 gm/dL      A/G Ratio 1.6 g/dL      BUN/Creatinine Ratio 14.8     Anion Gap 9.0 mmol/L      eGFR 96.0 mL/min/1.73     Narrative:      GFR Normal >60  Chronic Kidney Disease <60  Kidney Failure <15      CBC & Differential [[id number]]  (Normal) Collected: 03/16/23 0417    Specimen: Blood Updated: 03/16/23 0428    Narrative:      The following orders were created for panel order CBC & Differential.  Procedure                               Abnormality         Status                     ---------                               -----------         ------                     CBC Auto Differential[[id number]]        Normal              Final result                 Please view results for these tests on the individual orders.    CBC Auto Differential [[id number]]  (Normal) Collected: 03/16/23 0417    Specimen: Blood Updated: 03/16/23 0428     WBC 6.95 10*3/mm3      RBC 4.56 10*6/mm3      Hemoglobin 14.8 g/dL      Hematocrit 42.3 %      MCV 92.8 fL      MCH 32.5 pg      MCHC 35.0 g/dL      RDW 13.1 %      RDW-SD 44.3 fl      MPV 9.1 fL      Platelets 199 10*3/mm3      Neutrophil % 57.8 %      Lymphocyte % 30.1 %      Monocyte % 7.9 %       Eosinophil % 3.0 %      Basophil % 0.9 %      Immature Grans % 0.3 %      Neutrophils, Absolute 4.02 10*3/mm3      Lymphocytes, Absolute 2.09 10*3/mm3      Monocytes, Absolute 0.55 10*3/mm3      Eosinophils, Absolute 0.21 10*3/mm3      Basophils, Absolute 0.06 10*3/mm3      Immature Grans, Absolute 0.02 10*3/mm3      nRBC 0.0 /100 WBC     Comprehensive Metabolic Panel [[id number]]  (Abnormal) Collected: 03/15/23 0329    Specimen: Blood Updated: 03/15/23 0429     Glucose 112 mg/dL      BUN 14 mg/dL      Creatinine 0.99 mg/dL      Sodium 141 mmol/L      Potassium 3.9 mmol/L      Comment: Slight hemolysis detected by analyzer. Results may be affected.        Chloride 107 mmol/L      CO2 26.0 mmol/L      Calcium 8.6 mg/dL      Total Protein 6.4 g/dL      Albumin 4.0 g/dL      ALT (SGPT) 18 U/L      AST (SGOT) 20 U/L      Alkaline Phosphatase 40 U/L      Total Bilirubin 0.3 mg/dL      Globulin 2.4 gm/dL      A/G Ratio 1.7 g/dL      BUN/Creatinine Ratio 14.1     Anion Gap 8.0 mmol/L      eGFR 85.1 mL/min/1.73     Narrative:      GFR Normal >60  Chronic Kidney Disease <60  Kidney Failure <15      CBC & Differential [[id number]]  (Normal) Collected: 03/15/23 0329    Specimen: Blood Updated: 03/15/23 0351    Narrative:      The following orders were created for panel order CBC & Differential.  Procedure                               Abnormality         Status                     ---------                               -----------         ------                     CBC Auto Differential[[id number]]        Normal              Final result                 Please view results for these tests on the individual orders.    CBC Auto Differential [[id number]]  (Normal) Collected: 03/15/23 0329    Specimen: Blood Updated: 03/15/23 0351     WBC 6.54 10*3/mm3      RBC 4.65 10*6/mm3      Hemoglobin 14.8 g/dL      Hematocrit 43.5 %      MCV 93.5 fL      MCH 31.8 pg      MCHC 34.0 g/dL      RDW 13.2 %      RDW-SD 45.1 fl      MPV 9.2 fL       Platelets 212 10*3/mm3      Neutrophil % 54.2 %      Lymphocyte % 32.6 %      Monocyte % 8.6 %      Eosinophil % 3.5 %      Basophil % 0.8 %      Immature Grans % 0.3 %      Neutrophils, Absolute 3.55 10*3/mm3      Lymphocytes, Absolute 2.13 10*3/mm3      Monocytes, Absolute 0.56 10*3/mm3      Eosinophils, Absolute 0.23 10*3/mm3      Basophils, Absolute 0.05 10*3/mm3      Immature Grans, Absolute 0.02 10*3/mm3      nRBC 0.0 /100 WBC            CT Chest Without Contrast Diagnostic    Result Date: 3/15/2023  Conclusion: Linear atelectasis or scarring lower lobes. Ascending aorta dilated up to 4.8 cm. Coronary artery calcifications. Minimal cardiomegaly. Moderate distention of the stomach with ingested contents. At least moderate amount retained feces in the colon. Punctate nonobstructive renal calculi. 1.3 cm cyst right lobe of the liver. [id number] Electronically signed by:  Johnathon Musa MD  3/15/2023 5:32 PM CDT Workstation: [id number]    CT Angiogram Chest    Result Date: 3/16/2023  Conclusion: Aortic root dilated up to 4.5 cm. Proximal ascending aorta dilated up to 4.8 cm. Mid and distal ascending aorta dilated up to 4.4 cm. No aortic dissection. Coronary artery calcifications. Minimal cardiomegaly. [id number] Electronically signed by:  Johnathon Musa MD  3/16/2023 4:53 PM CDT Workstation: [id number]    XR Chest 1 View    Result Date: 3/16/2023  CONCLUSION: The lungs are clear of an acute process. Left-sided pacemaker with leads projected over the right atrium and right ventricle. No pneumothorax. Stable cardiomegaly. [id number] Electronically signed by:  Johnathon Musa MD  3/16/2023 4:45 PM CDT Workstation: [id number]       HOSPITAL COURSE:  Sinus Pause/AV Block/S/p Pacemaker Placement  Patient admitted for sinus pause. Dr. Martinez assessed patient a determined he had recurrent symptomatic high-grade AV block and recommended pacemaker placement. Patient placed on continuous cardiac monitoring with pacer pads and atropine in room with  patient prior to procedure. Patient tolerated pacemaker placement procedure well without any complications. Patient was monitored overnight with VSS and no acute cardiac events or new onset chest pain. Patient was started on Metoprolol succinate XL 25mg daily and Crestor 5mg daily. Patient instructed to follow up with Pacemaker clinic for wound check in 1 week and to follow up with Dr. Martinez in 1 month.     Dilated Ascending Aorta  Cardiac workup during hospitalization included Echocardiogram that showed EF 61-65% and moderately dilated proximal aorta. Patient then underwent CTA Chest that showed Aortic root dilated up to 4.5cm, Proximal ascending aorta dilated up to 4.8cm, and Distal ascending aorta dilated up to 4.4cm. Negative for aortic dissection. CT Surgery evaluated patient in hospital and recommended outpatient follow up and monitoring.     DISCHARGE CONDITION:   Stable    DISPOSITION:  Home or Self Care    DISCHARGE MEDICATIONS     Discharge Medications      New Medications      Instructions Start Date   metoprolol succinate XL 25 MG 24 hr tablet  Commonly known as: TOPROL-XL   25 mg, Oral, Every 24 Hours Scheduled   Start Date: March 18, 2023     rosuvastatin 5 MG tablet  Commonly known as: Crestor   5 mg, Oral, Daily         Continue These Medications      Instructions Start Date   acetaminophen 500 MG tablet  Commonly known as: TYLENOL   500 mg, Oral, Every 6 Hours PRN      meloxicam 15 MG tablet  Commonly known as: MOBIC   15 mg, Oral, Daily      VITAMIN C PO   Oral      VITAMIN D BOOSTER PO   Oral             INSTRUCTIONS:  Activity:   Activity Instructions     Activity as Tolerated          Diet:   Diet Instructions     Diet: Cardiac Diets; Healthy Heart (2-3 Na+); Texture: Regular Texture (IDDSI 7); Fluid Consistency: Thin (IDDSI 0)      Discharge Diet: Cardiac Diets    Cardiac Diet: Healthy Heart (2-3 Na+)    Texture: Regular Texture (IDDSI 7)    Fluid Consistency: Thin (IDDSI 0)          FOLLOW UP:    Additional Instructions for the Follow-ups that You Need to Schedule     Discharge Follow-up with Specialty: 6 months tms ct chest   As directed      Specialty: 6 months tms ct chest         CT Chest Without Contrast Diagnostic   Sep 18, 2023      Exam reason: taa            Follow-up Information     Mo Marques MD Follow up in 1 week(s).    Specialty: Internal Medicine  Why: Wednesday March 22, 2023 @ 3:30  Contact information:  [address] [place] [address] [place] [address]  [phone]             Mic Silverman MD .    Specialties: Cardiothoracic Surgery, Vascular Surgery, Thoracic Surgery  Why: office will call with appointment  Contact information:  [address] [place] [address] [address] [place] [address] [phone]                       Win Martinez MD Follow up in 1 Month  Cardiology  Schedule an appointment for a visit in 1 month  [address] [place] [address] [address]  [facility] [address] [place] [address]    PENDING TEST RESULTS AT DISCHARGE      Time: >30 minutes were spent in discharge planning, medication reconciliation and coordination of care for this patient.    Dr. Shiva Mehta is the attending at time of discharge, He is aware of the patient's status and agrees with the above discharge summary.      This document has been electronically signed by Dennis Colbert MD on March 17, 2023 14:17 CDT              Electronically signed by Shiva Mehta MD at 03/18/23 1238       Discharge Order (From admission, onward)     Start     Ordered    03/17/23 1141  Discharge patient  Once        Expected Discharge Date: 03/17/23    Discharge Disposition: Home or Self Care    Physician of Record for Attribution - Please select from Treatment Team: SHIVA MEHTA [[id number]]    Review needed by CMO to determine Physician of Record: No       Question Answer Comment   Physician of Record for Attribution - Please select from Treatment Team SHIVA MEHTA    Review needed by CMO to determine Physician  of Record No        03/17/23 1149

## 2023-03-22 ENCOUNTER — OFFICE VISIT (OUTPATIENT)
Dept: FAMILY MEDICINE CLINIC | Facility: CLINIC | Age: 63
End: 2023-03-22
Payer: COMMERCIAL

## 2023-03-22 VITALS
HEIGHT: 71 IN | DIASTOLIC BLOOD PRESSURE: 80 MMHG | HEART RATE: 69 BPM | WEIGHT: 205.4 LBS | BODY MASS INDEX: 28.76 KG/M2 | TEMPERATURE: 98 F | SYSTOLIC BLOOD PRESSURE: 128 MMHG | OXYGEN SATURATION: 97 %

## 2023-03-22 DIAGNOSIS — Z95.0 S/P PLACEMENT OF CARDIAC PACEMAKER: Chronic | ICD-10-CM

## 2023-03-22 DIAGNOSIS — Z00.00 ROUTINE GENERAL MEDICAL EXAMINATION AT A HEALTH CARE FACILITY: Primary | ICD-10-CM

## 2023-03-22 DIAGNOSIS — I71.21 ANEURYSM OF ASCENDING AORTA WITHOUT RUPTURE: Chronic | ICD-10-CM

## 2023-03-22 DIAGNOSIS — M15.9 PRIMARY OSTEOARTHRITIS INVOLVING MULTIPLE JOINTS: Chronic | ICD-10-CM

## 2023-03-22 DIAGNOSIS — E78.2 MIXED HYPERLIPIDEMIA: Chronic | ICD-10-CM

## 2023-03-22 PROCEDURE — 99396 PREV VISIT EST AGE 40-64: CPT | Performed by: INTERNAL MEDICINE

## 2023-03-22 RX ORDER — ROSUVASTATIN CALCIUM 10 MG/1
10 TABLET, COATED ORAL EVERY OTHER DAY
Qty: 45 TABLET | Refills: 3 | Status: SHIPPED | OUTPATIENT
Start: 2023-03-22

## 2023-03-22 RX ORDER — MELOXICAM 15 MG/1
15 TABLET ORAL DAILY PRN
Qty: 30 TABLET | Refills: 5 | COMMUNITY
Start: 2023-03-22

## 2023-03-22 RX ORDER — ZINC SULFATE 50(220)MG
220 CAPSULE ORAL DAILY
COMMUNITY

## 2023-03-22 RX ORDER — CHOLECALCIFEROL (VITAMIN D3) 125 MCG
100 CAPSULE ORAL DAILY
COMMUNITY

## 2023-03-22 NOTE — PATIENT INSTRUCTIONS
Exercising to Lose Weight  Getting regular exercise is important for everyone. It is especially important if you are overweight. Being overweight increases your risk of heart disease, stroke, diabetes, high blood pressure, and several types of cancer. Exercising, and reducing the calories you consume, can help you lose weight and improve fitness and health.  Exercise can be moderate or vigorous intensity. To lose weight, most people need to do a certain amount of moderate or vigorous-intensity exercise each week.  How can exercise affect me?  You lose weight when you exercise enough to burn more calories than you eat. Exercise also reduces body fat and builds muscle. The more muscle you have, the more calories you burn. Exercise also:  Improves mood.  Reduces stress and tension.  Improves your overall fitness, flexibility, and endurance.  Increases bone strength.  Moderate-intensity exercise  Moderate-intensity exercise is any activity that gets you moving enough to burn at least three times more energy (calories) than if you were sitting.  Examples of moderate exercise include:  Walking a mile in 15 minutes.  Doing light yard work.  Biking at an easy pace.  Most people should get at least 150 minutes of moderate-intensity exercise a week to maintain their body weight.  Vigorous-intensity exercise  Vigorous-intensity exercise is any activity that gets you moving enough to burn at least six times more calories than if you were sitting. When you exercise at this intensity, you should be working hard enough that you are not able to carry on a conversation.  Examples of vigorous exercise include:  Running.  Playing a team sport, such as football, basketball, and soccer.  Jumping rope.  Most people should get at least 75 minutes a week of vigorous exercise to maintain their body weight.  What actions can I take to lose weight?  The amount of exercise you need to lose weight depends on:  Your age.  The type of  exercise.  Any health conditions you have.  Your overall physical ability.  Talk to your health care provider about how much exercise you need and what types of activities are safe for you.  Nutrition    Make changes to your diet as told by your health care provider or diet and nutrition specialist (dietitian). This may include:  Eating fewer calories.  Eating more protein.  Eating less unhealthy fats.  Eating a diet that includes fresh fruits and vegetables, whole grains, low-fat dairy products, and lean protein.  Avoiding foods with added fat, salt, and sugar.  Drink plenty of water while you exercise to prevent dehydration or heat stroke.  Activity  Choose an activity that you enjoy and set realistic goals. Your health care provider can help you make an exercise plan that works for you.  Exercise at a moderate or vigorous intensity most days of the week.  The intensity of exercise may vary from person to person. You can tell how intense a workout is for you by paying attention to your breathing and heartbeat. Most people will notice their breathing and heartbeat get faster with more intense exercise.  Do resistance training twice each week, such as:  Push-ups.  Sit-ups.  Lifting weights.  Using resistance bands.  Getting short amounts of exercise can be just as helpful as long, structured periods of exercise. If you have trouble finding time to exercise, try doing these things as part of your daily routine:  Get up, stretch, and walk around every 30 minutes throughout the day.  Go for a walk during your lunch break.  Park your car farther away from your destination.  If you take public transportation, get off one stop early and walk the rest of the way.  Make phone calls while standing up and walking around.  Take the stairs instead of elevators or escalators.  Wear comfortable clothes and shoes with good support.  Do not exercise so much that you hurt yourself, feel dizzy, or get very short of breath.  Where to  find more information  [place] Department of Health and Human Services: www.hhs.gov  Centers for Disease Control and Prevention: www.cdc.gov  Contact a health care provider:  Before starting a new exercise program.  If you have questions or concerns about your weight.  If you have a medical problem that keeps you from exercising.  Get help right away if:  You have any of the following while exercising:  Injury.  Dizziness.  Difficulty breathing or shortness of breath that does not go away when you stop exercising.  Chest pain.  Rapid heartbeat.  These symptoms may represent a serious problem that is an emergency. Do not wait to see if the symptoms will go away. Get medical help right away. Call your local emergency services (911 in the [place] Do not drive yourself to the hospital.  Summary  Getting regular exercise is especially important if you are overweight.  Being overweight increases your risk of heart disease, stroke, diabetes, high blood pressure, and several types of cancer.  Losing weight happens when you burn more calories than you eat.  Reducing the amount of calories you eat, and getting regular moderate or vigorous exercise each week, helps you lose weight.  This information is not intended to replace advice given to you by your health care provider. Make sure you discuss any questions you have with your health care provider.  Document Revised: 02/13/2022 Document Reviewed: 02/13/2022  [employer]vier Patient Education © 2022 [employer]  Calorie Counting for Weight Loss  Calories are units of energy. Your body needs a certain number of calories from food to keep going throughout the day. When you eat or drink more calories than your body needs, your body stores the extra calories mostly as fat. When you eat or drink fewer calories than your body needs, your body burns fat to get the energy it needs.  Calorie counting means keeping track of how many calories you eat and drink each day. Calorie counting can be  helpful if you need to lose weight. If you eat fewer calories than your body needs, you should lose weight. Ask your health care provider what a healthy weight is for you.  For calorie counting to work, you will need to eat the right number of calories each day to lose a healthy amount of weight per week. A dietitian can help you figure out how many calories you need in a day and will suggest ways to reach your calorie goal.  A healthy amount of weight to lose each week is usually 1-2 lb (0.5-0.9 kg). This usually means that your daily calorie intake should be reduced by 500-750 calories.  Eating 1,200-1,500 calories a day can help most women lose weight.  Eating 1,500-1,800 calories a day can help most men lose weight.  What do I need to know about calorie counting?  Work with your health care provider or dietitian to determine how many calories you should get each day. To meet your daily calorie goal, you will need to:  Find out how many calories are in each food that you would like to eat. Try to do this before you eat.  Decide how much of the food you plan to eat.  Keep a food log. Do this by writing down what you ate and how many calories it had.  To successfully lose weight, it is important to balance calorie counting with a healthy lifestyle that includes regular activity.  Where do I find calorie information?  The number of calories in a food can be found on a Nutrition Facts label. If a food does not have a Nutrition Facts label, try to look up the calories online or ask your dietitian for help.  Remember that calories are listed per serving. If you choose to have more than one serving of a food, you will have to multiply the calories per serving by the number of servings you plan to eat. For example, the label on a package of bread might say that a serving size is 1 slice and that there are 90 calories in a serving. If you eat 1 slice, you will have eaten 90 calories. If you eat 2 slices, you will have  eaten 180 calories.  How do I keep a food log?  After each time that you eat, record the following in your food log as soon as possible:  What you ate. Be sure to include toppings, sauces, and other extras on the food.  How much you ate. This can be measured in cups, ounces, or number of items.  How many calories were in each food and drink.  The total number of calories in the food you ate.  Keep your food log near you, such as in a pocket-sized notebook or on an hilario or website on your mobile phone. Some programs will calculate calories for you and show you how many calories you have left to meet your daily goal.  What are some portion-control tips?  Know how many calories are in a serving. This will help you know how many servings you can have of a certain food.  Use a measuring cup to measure serving sizes. You could also try weighing out portions on a kitchen scale. With time, you will be able to estimate serving sizes for some foods.  Take time to put servings of different foods on your favorite plates or in your favorite bowls and cups so you know what a serving looks like.  Try not to eat straight from a food's packaging, such as from a bag or box. Eating straight from the package makes it hard to see how much you are eating and can lead to overeating. Put the amount you would like to eat in a cup or on a plate to make sure you are eating the right portion.  Use smaller plates, glasses, and bowls for smaller portions and to prevent overeating.  Try not to multitask. For example, avoid watching TV or using your computer while eating. If it is time to eat, sit down at a table and enjoy your food. This will help you recognize when you are full. It will also help you be more mindful of what and how much you are eating.  What are tips for following this plan?  Reading food labels  Check the calorie count compared with the serving size. The serving size may be smaller than what you are used to eating.  Check the  source of the calories. Try to choose foods that are high in protein, fiber, and vitamins, and low in saturated fat, trans fat, and sodium.  Shopping  Read nutrition labels while you shop. This will help you make healthy decisions about which foods to buy.  Pay attention to nutrition labels for low-fat or fat-free foods. These foods sometimes have the same number of calories or more calories than the full-fat versions. They also often have added sugar, starch, or salt to make up for flavor that was removed with the fat.  Make a grocery list of lower-calorie foods and stick to it.  Cooking  Try to cook your favorite foods in a healthier way. For example, try baking instead of frying.  Use low-fat dairy products.  Meal planning  Use more fruits and vegetables. One-half of your plate should be fruits and vegetables.  Include lean proteins, such as chicken, turkey, and fish.  Lifestyle  Each week, aim to do one of the followin minutes of moderate exercise, such as walking.  75 minutes of vigorous exercise, such as running.  General information  Know how many calories are in the foods you eat most often. This will help you calculate calorie counts faster.  Find a way of tracking calories that works for you. Get creative. Try different apps or programs if writing down calories does not work for you.  What foods should I eat?    Eat nutritious foods. It is better to have a nutritious, high-calorie food, such as an avocado, than a food with few nutrients, such as a bag of potato chips.  Use your calories on foods and drinks that will fill you up and will not leave you hungry soon after eating.  Examples of foods that fill you up are nuts and nut butters, vegetables, lean proteins, and high-fiber foods such as whole grains. High-fiber foods are foods with more than 5 g of fiber per serving.  Pay attention to calories in drinks. Low-calorie drinks include water and unsweetened drinks.  The items listed above may not be  a complete list of foods and beverages you can eat. Contact a dietitian for more information.  What foods should I limit?  Limit foods or drinks that are not good sources of vitamins, minerals, or protein or that are high in unhealthy fats. These include:  Candy.  Other sweets.  Sodas, specialty coffee drinks, alcohol, and juice.  The items listed above may not be a complete list of foods and beverages you should avoid. Contact a dietitian for more information.  How do I count calories when eating out?  Pay attention to portions. Often, portions are much larger when eating out. Try these tips to keep portions smaller:  Consider sharing a meal instead of getting your own.  If you get your own meal, eat only half of it. Before you start eating, ask for a container and put half of your meal into it.  When available, consider ordering smaller portions from the menu instead of full portions.  Pay attention to your food and drink choices. Knowing the way food is cooked and what is included with the meal can help you eat fewer calories.  If calories are listed on the menu, choose the lower-calorie options.  Choose dishes that include vegetables, fruits, whole grains, low-fat dairy products, and lean proteins.  Choose items that are boiled, broiled, grilled, or steamed. Avoid items that are buttered, battered, fried, or served with cream sauce. Items labeled as crispy are usually fried, unless stated otherwise.  Choose water, low-fat milk, unsweetened iced tea, or other drinks without added sugar. If you want an alcoholic beverage, choose a lower-calorie option, such as a glass of wine or light beer.  Ask for dressings, sauces, and syrups on the side. These are usually high in calories, so you should limit the amount you eat.  If you want a salad, choose a garden salad and ask for grilled meats. Avoid extra toppings such as sandhu, cheese, or fried items. Ask for the dressing on the side, or ask for olive oil and vinegar or  lemon to use as dressing.  Estimate how many servings of a food you are given. Knowing serving sizes will help you be aware of how much food you are eating at restaurants.  Where to find more information  Centers for Disease Control and Prevention: www.cdc.gov  [place] Department of Agriculture: myplate.gov  Summary  Calorie counting means keeping track of how many calories you eat and drink each day. If you eat fewer calories than your body needs, you should lose weight.  A healthy amount of weight to lose per week is usually 1-2 lb (0.5-0.9 kg). This usually means reducing your daily calorie intake by 500-750 calories.  The number of calories in a food can be found on a Nutrition Facts label. If a food does not have a Nutrition Facts label, try to look up the calories online or ask your dietitian for help.  Use smaller plates, glasses, and bowls for smaller portions and to prevent overeating.  Use your calories on foods and drinks that will fill you up and not leave you hungry shortly after a meal.  This information is not intended to replace advice given to you by your health care provider. Make sure you discuss any questions you have with your health care provider.  Document Revised: 01/28/2021 Document Reviewed: 01/28/2021  [employer]vier Patient Education © 2022 [employer]

## 2023-03-22 NOTE — PROGRESS NOTES
Chief Complaint  Annual Exam (wellness)    Subjective        History of Present Illness     Ayan presents  to the office for annual wellness exam.  Denies high risk behaviors.  He and his wife live in [place] and attend Religion at [employer].  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  He has forms he will bring back to the office to be completed for annual wellness exam through his employer [employer], which will provide the patient with premium discount on health insurance benefits.    Patient was hospitalized at [facility] 03/14/2023-03/17/2023 where he underwent pacemake placement by Dr. Martinez 03/16/2023 after experienced presyncopal episodes.  Prior to pacemaker placement, he was wearing a Holter monitor when he was noted to have experienced 3 to 4-second episodes of sinus pause.  Since discharge, he experienced a brief 5 second presyncopal episode, quite similar to the episodes he experienced prior to the pacemaker placement.  Other than this episode, he denies any other syncopal/presyncopal episodes, chest pain or other episodes.  BP and heart rate has been consistently at goal.      Patient then underwent CTA chest /03/16/2023 that showed aortic root dilated up to 4.5cm, proximal ascending aorta dilated up to 4.8cm, and distal ascending aorta dilated up to 4.4cm.      Patient also had incidental finding of 1.3 cm cyst right lobe of the liver.  His wife states this was not mentioned with hospitalization, but she had noticed it reviewing his radiology reports.  We will get some repeat imaging in six months to follow up.  Reassurance is given.  In summer of 2016, there was 9 mm hypodense round structure in the caudate lobe of the liver, felt to be a cyst. Normal liver function with current labs.     Weight is stable from one year ago.  He is considering group home and  I encouraged him to be thinking of ways to stay active if does retire soon.    Lab results are  "reviewed with the patient today.  CBC unremarkable.  Fasting glucose 114.   A1c 5.3.  Normal thyroid screen.  LDL cholesterol above goal at 128.   He has been holding the Crestor due to myalgias.         Objective   Vital Signs:  /80   Pulse 69   Temp 98 °F (36.7 °C)   Ht 180.3 cm (71\")   Wt 93.2 kg (205 lb 6.4 oz)   SpO2 97%   BMI 28.65 kg/m²   Estimated body mass index is 28.65 kg/m² as calculated from the following:    Height as of this encounter: 180.3 cm (71\").    Weight as of this encounter: 93.2 kg (205 lb 6.4 oz).             Physical Exam  Vitals reviewed.   Constitutional:       General: He is not in acute distress.     Appearance: He is well-developed.      Comments: Pleasant male, overweight.  Accompanied by wife.    HENT:      Head: Normocephalic and atraumatic.      Nose:      Right Sinus: No maxillary sinus tenderness or frontal sinus tenderness.      Left Sinus: No maxillary sinus tenderness or frontal sinus tenderness.      Mouth/Throat:      Mouth: No oral lesions.      Pharynx: Uvula midline.      Tonsils: No tonsillar exudate.   Eyes:      Conjunctiva/sclera: Conjunctivae normal.      Pupils: Pupils are equal, round, and reactive to light.   Neck:      Thyroid: No thyroid mass or thyromegaly.      Vascular: No carotid bruit or JVD.      Trachea: Trachea normal. No tracheal deviation.   Cardiovascular:      Rate and Rhythm: Normal rate and regular rhythm.  No extrasystoles are present.     Chest Wall: PMI is not displaced.      Heart sounds: Normal heart sounds. No murmur heard.     Comments: Pacemaker under left clavicle  Pulmonary:      Effort: Pulmonary effort is normal. No accessory muscle usage or respiratory distress.      Breath sounds: Normal breath sounds. No decreased breath sounds, wheezing, rhonchi or rales.   Abdominal:      General: Bowel sounds are normal. There is no distension.      Palpations: Abdomen is soft.      Tenderness: There is no abdominal tenderness. "   Musculoskeletal:      Cervical back: Neck supple.   Lymphadenopathy:      Cervical: No cervical adenopathy.   Skin:     General: Skin is warm and dry.      Findings: No rash.      Nails: There is no clubbing.   Neurological:      Mental Status: He is alert and oriented to person, place, and time.      Cranial Nerves: No cranial nerve deficit.      Coordination: Coordination normal.   Psychiatric:         Speech: Speech normal.         Behavior: Behavior normal.         Thought Content: Thought content normal.         Judgment: Judgment normal.            Result Review :    CMP    CMP 3/15/23 3/16/23 3/16/23 3/17/23     0417 0857    Glucose 112 (A) 105 (A) 120 (A) 104 (A)   BUN 14 13 12 12   Creatinine 0.99 0.88 0.88 0.78   eGFR 85.1 96.0 96.0 99.6   Sodium 141 140 139 138   Potassium 3.9 4.7 3.9 3.9   Chloride 107 106 105 105   Calcium 8.6 8.6 8.7 8.7   Total Protein 6.4 6.3  6.4   Albumin 4.0 3.9  3.9   Globulin 2.4 2.4  2.5   Total Bilirubin 0.3 0.6  0.8   Alkaline Phosphatase 40 40  40   AST (SGOT) 20 21  18   ALT (SGPT) 18 17  14   Albumin/Globulin Ratio 1.7 1.6  1.6   BUN/Creatinine Ratio 14.1 14.8 13.6 15.4   Anion Gap 8.0 9.0 11.0 10.0   (A) Abnormal value       Comments are available for some flowsheets but are not being displayed.           CBC w/diff    CBC w/Diff 3/15/23 3/16/23 3/16/23 3/17/23     0417 0857    WBC 6.54 6.95 5.81 7.99   RBC 4.65 4.56 4.89 4.71   Hemoglobin 14.8 14.8 15.5 15.3   Hematocrit 43.5 42.3 44.9 43.5   MCV 93.5 92.8 91.8 92.4   MCH 31.8 32.5 31.7 32.5   MCHC 34.0 35.0 34.5 35.2   RDW 13.2 13.1 13.0 12.9   Platelets 212 199 207 185   Neutrophil Rel % 54.2 57.8  69.4   Immature Granulocyte Rel % 0.3 0.3  0.1   Lymphocyte Rel % 32.6 30.1  19.6   Monocyte Rel % 8.6 7.9  8.9   Eosinophil Rel % 3.5 3.0  1.6   Basophil Rel % 0.8 0.9  0.4           Lipid Panel    Lipid Panel 3/14/23   Total Cholesterol 195   Triglycerides 98   HDL Cholesterol 49   VLDL Cholesterol 18   LDL Cholesterol   128 (A)   LDL/HDL Ratio 2.58   (A) Abnormal value            TSH    TSH 3/14/23   TSH 3.350           A1C Last 3 Results    HGBA1C Last 3 Results 3/14/23   Hemoglobin A1C 5.30           PSA    PSA 3/14/23   PSA 1.290           Data reviewed: Radiologic studies CTA chest /03/16/2023 , Consultant notes Dr. Martinez, cardiology and Recent hospitalization notes [facility] 03/14/2023-03/17/2023             Assessment and Plan   Diagnoses and all orders for this visit:    1. Routine general medical examination at a health care facility (Primary)    2. Mixed hyperlipidemia    3. Aneurysm of ascending aorta without rupture (HCC) -Dr. Silverman    4. S/P placement of cardiac pacemaker -3/2023, Dr. Martinez    5. Primary osteoarthritis involving multiple joints    Other orders  -     rosuvastatin (Crestor) 10 MG tablet; Take 1 tablet by mouth Every Other Day.  Dispense: 45 tablet; Refill: 3  -     Zoster Vac Recomb Adjuvanted 50 MCG/0.5ML reconstituted suspension; Inject 0.5 mL into the appropriate muscle as directed by prescriber Every 2 (Two) Months.  Dispense: 1 each; Refill: 1                Annual wellness exam completed today.  He will bring some forms back to the office to be completed for annual wellness exam through his employer [employer], which will provide the patient with premium discount on health insurance benefits. Patient has not had flu vaccines for the past 3 years.  We will address this next fall and encourage flu vaccine.  With next year's labs, we will include hepatitis C screen.  Patient had Zostavax several years ago.  We discussed Shingrix, which he would like.  A prescription sent for Shingrix to [facility].     Keep follow up visits scheduled with Dr. Martinez, cardiologist.  I will send a note to make Dr. Martinez aware of the one brief presyncopal episodes after having pacemaker placed to ask him if he needs to be seen sooner than follow up appointment 04/19/2023.  Patient is also going  to be followed with Dr. Silverman, cardiothoracic surgeon, to follow newly diagnosed aortic anuersym.  His wife may want to call to confirm an appointment or check with Dr. Martinez's office.  I asked him to check BP and HR if he experiences any additional presyncopal episodes.      To help lower LDL cholesterol in this patient with recent pacemaker placement and newly diagnosed aortic anuersym, we will have patient resume Crestor 10 mg taking 3 days weekly on Monday, Wednesday, and Friday adding OTC Co Q 10, 100-200 mg daily to help improve tolerability with the Crestor.  He can also take occasional breaks from the Crestor if needed for myalgias.  He is to notify me if not able to tolerate Crestor 3 days weekly and we will try different statins and and Zetia.    Continue to minimize the use of NSAIDs/Mobic for osteoarthritis, which he is currently using three days weekly in order to reduce cardiovascular risk.  I encouraged him to take Tylenol on days he is not taking Mobic if he needs pain relief.  Cardiology has not yet recommended antiplatelet therapy.    Pursue diet and exercise to help delay progression of IFG.  Encouraged to make sure he carves out regular exercise time if he retires soon.     Return in 6 months for follow up with  fasting labs one week prior or sooner if needed.       Scribed for Dr. Marques by [facility] Arre[facility].     Follow Up   Return in about 6 months (around 9/22/2023) for Next scheduled follow up, Follow up in six months with labs one week prior..  Patient was given instructions and counseling regarding his condition or for health maintenance advice. Please see specific information pulled into the AVS if appropriate.

## 2023-03-24 ENCOUNTER — CLINICAL SUPPORT (OUTPATIENT)
Dept: CARDIOLOGY | Facility: CLINIC | Age: 63
End: 2023-03-24
Payer: COMMERCIAL

## 2023-03-24 DIAGNOSIS — Z95.0 S/P PLACEMENT OF CARDIAC PACEMAKER: Primary | ICD-10-CM

## 2023-03-24 NOTE — PROGRESS NOTES
Patient here for wound check post op pacemaker 3/16/23. Left subclavian dressing removed, no drainage or redness at site. Incisional edges well approximated. Area cleaned with betadine, steri strips applied, and covered with large band aid. Patient to keep dressing dry and continue with left arm restrictions. Patient voiced understanding.

## 2023-03-28 ENCOUNTER — OFFICE VISIT (OUTPATIENT)
Dept: OTOLARYNGOLOGY | Facility: CLINIC | Age: 63
End: 2023-03-28
Payer: COMMERCIAL

## 2023-03-28 VITALS — HEIGHT: 71 IN | BODY MASS INDEX: 29.12 KG/M2 | TEMPERATURE: 98.1 F | WEIGHT: 208 LBS

## 2023-03-28 DIAGNOSIS — J34.2 NASAL SEPTAL DEFORMITY: ICD-10-CM

## 2023-03-28 DIAGNOSIS — J33.9 NASAL POLYPOSIS: Primary | ICD-10-CM

## 2023-03-28 DIAGNOSIS — J32.0 CHRONIC MAXILLARY SINUSITIS: ICD-10-CM

## 2023-03-28 PROCEDURE — 99204 OFFICE O/P NEW MOD 45 MIN: CPT | Performed by: OTOLARYNGOLOGY

## 2023-03-28 PROCEDURE — 31231 NASAL ENDOSCOPY DX: CPT | Performed by: OTOLARYNGOLOGY

## 2023-03-28 RX ORDER — PREDNISONE 20 MG/1
TABLET ORAL
Qty: 12 TABLET | Refills: 0 | Status: SHIPPED | OUTPATIENT
Start: 2023-03-28

## 2023-03-28 RX ORDER — FLUTICASONE PROPIONATE 50 MCG
2 SPRAY, SUSPENSION (ML) NASAL DAILY
Qty: 16 G | Refills: 11 | Status: SHIPPED | OUTPATIENT
Start: 2023-03-28

## 2023-03-28 RX ORDER — CEFUROXIME AXETIL 500 MG/1
500 TABLET ORAL 2 TIMES DAILY
Qty: 28 TABLET | Refills: 0 | Status: SHIPPED | OUTPATIENT
Start: 2023-03-28 | End: 2023-04-11

## 2023-03-28 NOTE — PROGRESS NOTES
Subjective   Ayan Shipman is a 62 y.o. male.       History of Present Illness     Patient reports he has had left-sided nasal obstruction that he is noted for 2 to 3 years.  Says he can breathe in okay but when he breathes out it feels like there is a flap in there.  Says it does not really bother him that badly but is worse when he has an upper respiratory infection.    The following portions of the patient's history were reviewed and updated as appropriate: allergies, current medications, past family history, past medical history, past social history, past surgical history and problem list.     reports that he has never smoked. He has never been exposed to tobacco smoke. He has never used smokeless tobacco. He reports that he does not drink alcohol and does not use drugs.   Patient is not a tobacco user and has not been counseled for use of tobacco products      Review of Systems        Objective   Physical Exam  Ears: External ears no deformity, canals no discharge, tympanic membranes intact clear and mobile bilaterally.  Nares: Boggy mucosa septum to the left  Oral cavity no masses or lesions  Neck no adenopathy  Nasal endoscopy is performed: Everardo-Synephrine and Xylocaine are instilled the nares bilaterally.  0° scope is passed into each nostril.  The inferior, middle, and superior turbinates as well as nasal septum and nasopharynx are examined.  Pertinent findings include: Septum to the left.  Polyp originating out of the left maxillary sinus with a defect in the posterior fontanelle along with some mucoid secretions.  No mass or polyp in the right middle meatal cleft         Assessment and Plan   Diagnoses and all orders for this visit:    1. Nasal polyposis (Primary)    2. Nasal septal deformity    3. Chronic maxillary sinusitis    Other orders  -     fluticasone (FLONASE) 50 MCG/ACT nasal spray; 2 sprays into the nostril(s) as directed by provider Daily.  Dispense: 16 g; Refill: 11  -     predniSONE  (DELTASONE) 20 MG tablet; 1 pill by mouth 3 times a day for 2 days then 1 pill by mouth twice a day for 2 days then 1 pill by mouth daily for 2 days  Dispense: 12 tablet; Refill: 0  -     cefuroxime (CEFTIN) 500 MG tablet; Take 1 tablet by mouth 2 (Two) Times a Day for 14 days.  Dispense: 28 tablet; Refill: 0           Plan: Medical treatment with Flonase 2 sprays each nostril daily, taper dose of prednisone, and 2 weeks of Ceftin.  Recheck in 1 month.  Call for problems.

## 2023-03-29 ENCOUNTER — TELEPHONE (OUTPATIENT)
Dept: CARDIOLOGY | Facility: CLINIC | Age: 63
End: 2023-03-29

## 2023-03-29 ENCOUNTER — CLINICAL SUPPORT (OUTPATIENT)
Dept: CARDIOLOGY | Facility: CLINIC | Age: 63
End: 2023-03-29
Payer: COMMERCIAL

## 2023-03-29 DIAGNOSIS — I44.1 AV BLOCK, MOBITZ II: Primary | ICD-10-CM

## 2023-03-29 DIAGNOSIS — R07.2 PRECORDIAL PAIN: ICD-10-CM

## 2023-03-29 DIAGNOSIS — Z95.0 PRESENCE OF BIVENTRICULAR CARDIAC PACEMAKER: Primary | ICD-10-CM

## 2023-03-29 DIAGNOSIS — R06.02 SOB (SHORTNESS OF BREATH): Primary | ICD-10-CM

## 2023-03-29 NOTE — PROGRESS NOTES
Pacemaker Evaluation Report    March 29, 2023    Primary Cardiologist: Dr. Martinez  Implanting MD: Dr. Martinez  :[employer] Model: AssCopaCast MRI 2272 Serial Number: [id number]  Implant date: 3/16/23    Reason for evaluation: PPM, symptoms and office  Cardiac device indication(s): high grade AV block    Battery  MONISHA: 7.4-11.3years     Interrogation Results  Atrial sensing: P wave: 4.8 mV  Atrial capture: 0.75 V @ 0.5 ms   Atrial lead impedance: 530 ohms  Ventricular sensing: R wave: 8.8 mV  Ventricular capture: 0.625 V @ 0.5 ms  Ventricular lead impedance: right  450 ohms    Parameters  Mode: DDDR  Base Rate: 60/130    Diagnostic Data  Atrial paced: 11 %   Ventricular paced: <1 %  Mode switch: 0%  AT/AF [place]: 0%  AHR: 0  VHR: 0    Intrinsic Rate:     Changes made: None    Conclusions:  Normal pacemaker function. Follow up as scheduled.    Assessment:  Paroxysmal high-grade AV block      Normal functioning pacemaker                This document has been electronically signed by Carmelita Bassett RN on March 29, 2023 10:03 CDT

## 2023-03-29 NOTE — TELEPHONE ENCOUNTER
Contacted patient, advised him per Dr. Martinez he was ordering a stress test since he reported SOB and chest pain at his pacer check. Stress test is scheduled 4/10/23 @ 0900. Do not eat, drink, smoke, dip or chew after midnight the night before. Take all meds just hold diabetic medication. No caffeine 12 hours before. He was understanding.

## 2023-03-29 NOTE — PROGRESS NOTES
Patient here for wound check. Left subclavian dressing removed, no drainage or redness at site. Incisional edges well approximated. Area cleaned with betadine and left open to air. OK for patient to shower but to continue with left arm restrictions. Patient to call office with drainage or redness at site, or if he develops a temperature. Patient voiced understanding.

## 2023-04-10 ENCOUNTER — HOSPITAL ENCOUNTER (OUTPATIENT)
Dept: NUCLEAR MEDICINE | Facility: HOSPITAL | Age: 63
Discharge: HOME | End: 2023-04-10
Payer: COMMERCIAL

## 2023-04-10 ENCOUNTER — HOSPITAL ENCOUNTER (OUTPATIENT)
Dept: CARDIOLOGY | Facility: HOSPITAL | Age: 63
Discharge: HOME | End: 2023-04-10
Payer: COMMERCIAL

## 2023-04-10 DIAGNOSIS — R07.2 PRECORDIAL PAIN: ICD-10-CM

## 2023-04-10 DIAGNOSIS — R06.02 SOB (SHORTNESS OF BREATH): ICD-10-CM

## 2023-04-10 LAB
BH CV REST NUCLEAR ISOTOPE DOSE: 11 MCI
BH CV STRESS COMMENTS STAGE 1: NORMAL
BH CV STRESS DOSE REGADENOSON STAGE 1: 0.4
BH CV STRESS DURATION MIN STAGE 1: 0
BH CV STRESS DURATION SEC STAGE 1: 10
BH CV STRESS NUCLEAR ISOTOPE DOSE: 5.1 MCI
BH CV STRESS PROTOCOL 1: NORMAL
BH CV STRESS RECOVERY BP: NORMAL MMHG
BH CV STRESS RECOVERY HR: 78 BPM
BH CV STRESS RECOVERY O2: 98 %
BH CV STRESS STAGE 1: 1
LV EF NUC BP: 65 %
MAXIMAL PREDICTED HEART RATE: 156 BPM
PERCENT MAX PREDICTED HR: 52.56 %
STRESS BASELINE BP: NORMAL MMHG
STRESS BASELINE HR: 62 BPM
STRESS O2 SAT REST: 99 %
STRESS PERCENT HR: 62 %
STRESS POST O2 SAT PEAK: 99 %
STRESS POST PEAK BP: NORMAL MMHG
STRESS POST PEAK HR: 82 BPM
STRESS TARGET HR: 133 BPM

## 2023-04-10 PROCEDURE — 78451 HT MUSCLE IMAGE SPECT SING: CPT

## 2023-04-10 PROCEDURE — 93017 CV STRESS TEST TRACING ONLY: CPT

## 2023-04-10 PROCEDURE — 0 TECHNETIUM SESTAMIBI: Performed by: INTERNAL MEDICINE

## 2023-04-10 PROCEDURE — A9500 TC99M SESTAMIBI: HCPCS | Performed by: INTERNAL MEDICINE

## 2023-04-10 PROCEDURE — 78451 HT MUSCLE IMAGE SPECT SING: CPT | Performed by: INTERNAL MEDICINE

## 2023-04-10 PROCEDURE — 25010000002 REGADENOSON 0.4 MG/5ML SOLUTION: Performed by: INTERNAL MEDICINE

## 2023-04-10 PROCEDURE — 93018 CV STRESS TEST I&R ONLY: CPT | Performed by: INTERNAL MEDICINE

## 2023-04-10 RX ORDER — SODIUM CHLORIDE 0.9 % (FLUSH) 0.9 %
10 SYRINGE (ML) INJECTION ONCE
Status: COMPLETED | OUTPATIENT
Start: 2023-04-10 | End: 2023-04-10

## 2023-04-10 RX ADMIN — TECHNETIUM TC 99M SESTAMIBI 1 DOSE: 1 INJECTION INTRAVENOUS at 09:04

## 2023-04-10 RX ADMIN — Medication 10 ML: at 10:16

## 2023-04-10 RX ADMIN — TECHNETIUM TC 99M SESTAMIBI 1 DOSE: 1 INJECTION INTRAVENOUS at 10:17

## 2023-04-10 RX ADMIN — REGADENOSON 0.4 MG: 0.08 INJECTION, SOLUTION INTRAVENOUS at 10:16

## 2023-04-11 ENCOUNTER — PREP FOR SURGERY (OUTPATIENT)
Dept: OTHER | Facility: HOSPITAL | Age: 63
End: 2023-04-11
Payer: COMMERCIAL

## 2023-04-11 ENCOUNTER — TELEPHONE (OUTPATIENT)
Dept: CARDIOLOGY | Facility: CLINIC | Age: 63
End: 2023-04-11
Payer: COMMERCIAL

## 2023-04-11 DIAGNOSIS — R07.2 PRECORDIAL PAIN: Primary | ICD-10-CM

## 2023-04-11 RX ORDER — ASPIRIN 81 MG/1
81 TABLET, CHEWABLE ORAL ONCE
OUTPATIENT
Start: 2023-04-11 | End: 2023-04-11

## 2023-04-11 RX ORDER — SODIUM CHLORIDE 0.9 % (FLUSH) 0.9 %
3 SYRINGE (ML) INJECTION EVERY 12 HOURS SCHEDULED
OUTPATIENT
Start: 2023-04-11

## 2023-04-11 RX ORDER — SODIUM CHLORIDE 0.9 % (FLUSH) 0.9 %
10 SYRINGE (ML) INJECTION AS NEEDED
OUTPATIENT
Start: 2023-04-11

## 2023-04-11 RX ORDER — SODIUM CHLORIDE 9 MG/ML
50 INJECTION, SOLUTION INTRAVENOUS CONTINUOUS
OUTPATIENT
Start: 2023-04-11 | End: 2023-04-11

## 2023-04-11 RX ORDER — ASPIRIN 81 MG/1
81 TABLET ORAL DAILY
OUTPATIENT
Start: 2023-04-12

## 2023-04-11 NOTE — TELEPHONE ENCOUNTER
Contacted patient, stress test reviewed, possibly ischemia not a good study, heart cath recommended based on stress test. Will arrange with Dr. Serrano to see pt.

## 2023-04-12 ENCOUNTER — TELEPHONE (OUTPATIENT)
Dept: CARDIOLOGY | Facility: CLINIC | Age: 63
End: 2023-04-12
Payer: COMMERCIAL

## 2023-04-12 PROBLEM — R07.2 PRECORDIAL PAIN: Status: ACTIVE | Noted: 2023-04-12

## 2023-04-12 NOTE — TELEPHONE ENCOUNTER
Patient scheduled for [facility] on April 25. All instructions were discussed with the patients wife.

## 2023-04-19 ENCOUNTER — OFFICE VISIT (OUTPATIENT)
Dept: CARDIOLOGY | Facility: CLINIC | Age: 63
End: 2023-04-19
Payer: COMMERCIAL

## 2023-04-19 VITALS
WEIGHT: 208.8 LBS | HEART RATE: 69 BPM | DIASTOLIC BLOOD PRESSURE: 76 MMHG | BODY MASS INDEX: 29.23 KG/M2 | SYSTOLIC BLOOD PRESSURE: 122 MMHG | OXYGEN SATURATION: 99 % | HEIGHT: 71 IN

## 2023-04-19 DIAGNOSIS — I71.21 ANEURYSM OF ASCENDING AORTA WITHOUT RUPTURE: Chronic | ICD-10-CM

## 2023-04-19 DIAGNOSIS — E78.2 MIXED HYPERLIPIDEMIA: Chronic | ICD-10-CM

## 2023-04-19 DIAGNOSIS — I44.1 AV BLOCK, MOBITZ II: Primary | ICD-10-CM

## 2023-04-19 RX ORDER — METOPROLOL SUCCINATE 25 MG/1
25 TABLET, EXTENDED RELEASE ORAL
Qty: 30 TABLET | Refills: 0 | Status: SHIPPED | OUTPATIENT
Start: 2023-04-19

## 2023-04-19 NOTE — PROGRESS NOTES
Ayan Shipman  62 y.o. male    04/19/2023  1. AV block, Mobitz II    2. Aneurysm of ascending aorta without rupture (HCC) -Dr. Silverman    3. Mixed hyperlipidemia        History of Present Illness:  62 years old patient hospitalized in March 2023 for management and evaluation of symptomatic paroxysmal high-grade AV block with a symptom of near syncope.  No history of diabetes hypertension or cardiac history reported by the patient and the family.  Patient underwent evaluation with echocardiogram reported preserved left ventricle systolic function and moderately dilated ascending aorta subsequent evaluated by CT chest revealed aortic root dilated 4.5 proximal ascending aorta 4.8 mid and distal ascending aorta 4.4.  Patient follows with Dr. Silverman.  Given the risk factors patient risk stratified with stress test recommend further risk stratification with cardiac cath or CT coronary angiogram.  Patient was referred to Dr. Serrano evaluated and scheduled to undergo cardiac catheterization.  No symptom of cardiac decompensation such orthopnea PND chest pain lightheaded dizziness reported.  No significant pulmonary quality of life after pacemaker      Stress test April 2023   Left ventricular ejection fraction is normal (Calculated EF = 65%).  •  Diaphragmatic attenuation and GI artifacts are present.  •  Findings consistent with a normal ECG stress test by EKG criteria  •  Decreased tracer uptake noted in the inferior wall, lateral wall and anterior wall on stress images.  Since the image quality was poor it was difficult to establish if this represented true ischemia.  Consider CT angiogram of the coronary arteries or cardiac catheterization if clinically indicated.    Date of Procedure: 03/16/23     Referring Physician:      /ELECTROPHYSIOLOGIST:     Dr. Martinez     PROCEDURE(S) PERFORMED:    1. Implantation of a dual-chamber permanent pacemaker.        INDICATIONS FOR PROCDEDURE:            Symptomatic  high-grade AV block        CT chest March 2023    IMPRESSION:  Conclusion:  Aortic root dilated up to 4.5 cm.  Proximal ascending aorta dilated up to 4.8 cm.  Mid and distal ascending aorta dilated up to 4.4 cm.  No aortic dissection.  Coronary artery calcifications    Echo March 2023    •  Left ventricular ejection fraction appears to be 61 - 65%.  •  Left ventricular diastolic dysfunction is noted.  •  There is calcification of the aortic valve.  •  Estimated right ventricular systolic pressure from tricuspid regurgitation is normal (<35 mmHg).  •  Mild dilation of the aortic root is present. Moderate dilation of the proximal aorta is present      SUBJECTIVE:    No Known Allergies    Past Medical History:   Diagnosis Date   • Asthma 2019    Restricted Left Nostril   • Class 1 obesity due to excess calories with serious comorbidity and body mass index (BMI) of 30.0 to 30.9 in adult 03/08/2021   • Erectile dysfunction 02/26/2019   • GERD (gastroesophageal reflux disease)    • Hammertoes of both feet 03/08/2021   • Hemorrhoids    • Hypercholesterolemia    • Impaired fasting glucose 03/08/2021   • Impotence    • Overweight (BMI 25.0-29.9) 02/26/2019   • Palpitations    • Primary osteoarthritis involving multiple joints 10/08/2021   • Primary osteoarthritis of both knees 02/26/2019   • Shoulder pain     Sprain of shoulder and upper arm - right; improved          Past Surgical History:   Procedure Laterality Date   • CARDIAC ELECTROPHYSIOLOGY PROCEDURE Left 03/16/2023    Procedure: Pacemaker DC new;  Surgeon: Win Martinez MD;  Location: [facility] CATH INVASIVE LOCATION;  Service: Cardiology;  Laterality: Left;   • CARDIAC SURGERY  3/16/23    Pacemaker   • COLONOSCOPY N/A 07/02/2021    Procedure: COLONOSCOPY;  Surgeon: Kyle Thakur DO;  Location: [facility] ENDOSCOPY;  Service: Gastroenterology;  Laterality: N/A;   • ENDOSCOPY AND COLONOSCOPY  03/17/2010    Internal grade I hemorrhoids found in the anus   • HERNIA  REPAIR     • INJECTION OF MEDICATION  07/08/2013    Kenalog (3)      • KNEE SURGERY         Family History   Problem Relation Age of Onset   • Diabetes Other    • Heart disease Other    • Hypertension Other    • Arthritis Other    • Stroke Mother    • Arthritis Mother    • Hyperlipidemia Mother    • Hypertension Mother    • Osteoarthritis Mother    • ALS Father    • Early death Father         ALS   • Diabetes Brother    • Heart disease Brother    • Cancer Brother         Skin   • Diabetes Brother    • Heart disease Brother        Social History     Socioeconomic History   • Marital status:    Tobacco Use   • Smoking status: Never     Passive exposure: Never   • Smokeless tobacco: Never   Vaping Use   • Vaping Use: Never used   Substance and Sexual Activity   • Alcohol use: Never   • Drug use: Never   • Sexual activity: Not Currently       Current Outpatient Medications   Medication Sig Dispense Refill   • acetaminophen (TYLENOL) 500 MG tablet Take 1 tablet by mouth Every 6 (Six) Hours As Needed for Mild Pain.     • Ascorbic Acid (VITAMIN C PO) Take  by mouth.     • Coenzyme Q-10 100 MG capsule Take 1 capsule by mouth Daily.     • fluticasone (FLONASE) 50 MCG/ACT nasal spray 2 sprays into the nostril(s) as directed by provider Daily. 16 g 11   • meloxicam (MOBIC) 15 MG tablet Take 1 tablet by mouth Daily As Needed for Moderate Pain. With food.  Use sparingly 30 tablet 5   • metoprolol succinate XL (TOPROL-XL) 25 MG 24 hr tablet Take 1 tablet by mouth Daily. 30 tablet 0   • Nutritional Supplements (VITAMIN D BOOSTER PO) Take  by mouth.     • rosuvastatin (Crestor) 10 MG tablet Take 1 tablet by mouth Every Other Day. 45 tablet 3   • zinc sulfate (ZINCATE) 220 (50 Zn) MG capsule Take 1 capsule by mouth Daily.     • Zoster Vac Recomb Adjuvanted 50 MCG/0.5ML reconstituted suspension Inject 0.5 mL into the appropriate muscle as directed by prescriber Every 2 (Two) Months. 1 each 1     No current  "facility-administered medications for this visit.       Review of Systems:   Constitutional:  no change in exercise tolerance.  HENT: Denies any hearing loss, epistaxis  Eyes: No blurred  Respiratory:  Denies dyspnea with exertion,no cough or wheezing  Cardiovascular: See H&P  Gastrointestinal:  Denies change in bowel habits, dyspepsia, ulcer disease  Endocrine: Negative for cold intolerance, heat intolerance, polydipsia, polyphagia  Genitourinary: Negative.  Musculoskeletal: Denies any history of arthritic symptoms or back problems.   Skin:  Denies rashes or skin lesions.   Allergic/Immunologic: Negative.  Negative for environmental allergies  Neurological:  Denies any history of recurrent headaches   Hematological: No history of easy bruisability  Psychiatric/Behavioral: Denies any history of depression    OBJECTIVE:    /76 (BP Location: Right arm, Patient Position: Sitting, Cuff Size: Adult)   Pulse 69   Ht 180.3 cm (71\")   Wt 94.7 kg (208 lb 12.8 oz)   SpO2 99%   BMI 29.12 kg/m²     Physical Exam:   Constitutional: Cooperative, alert and oriented, well-developed, well-nourished, in no acute distress.   Head: Normocephalic, conjunctive is pink, thyroid is nonpalpable, no jugular is distention  Cardiovascular regular rate/rhythm, no S3, no pericardial rub  Pulmonary/Chest: Chest positive bilateral, good air entry, no rales, no wheezing  Abdominal: Abdomen soft, bowel sounds normoactive  Musculoskeletal: No deformities, positive peripheral pulses  Neurological: No gross motor or sensory deficits noted, affect appropriate.   Skin: Warm and dry to the touch, no apparent skin lesions or masses noted.   Psychiatric: Normal mood and affect. Behavior is normal    Procedures    Lab Results   Component Value Date    WBC 7.99 03/17/2023    HGB 15.3 03/17/2023    HCT 43.5 03/17/2023    MCV 92.4 03/17/2023     03/17/2023     Lab Results   Component Value Date    GLUCOSE 104 (H) 03/17/2023    BUN 12 " 03/17/2023    CREATININE 0.78 03/17/2023    EGFRIFNONA 82 03/02/2021    BCR 15.4 03/17/2023    CO2 23.0 03/17/2023    CALCIUM 8.7 03/17/2023    ALBUMIN 3.9 03/17/2023    AST 18 03/17/2023    ALT 14 03/17/2023     Lab Results   Component Value Date    CHOL 195 03/14/2023    CHOL 191 03/08/2022    CHOL 196 03/02/2021     Lab Results   Component Value Date    TRIG 98 03/14/2023    TRIG 99 03/08/2022    TRIG 110 03/02/2021     Lab Results   Component Value Date    HDL 49 03/14/2023    HDL 49 03/08/2022    HDL 43 03/02/2021     No components found for: LDLCALC  Lab Results   Component Value Date     (H) 03/14/2023     (H) 03/08/2022     (H) 03/02/2021     No results found for: HDLLDLRATIO  No components found for: CHOLHDL  Lab Results   Component Value Date    HGBA1C 5.30 03/14/2023     Lab Results   Component Value Date    TSH 3.350 03/14/2023           ASSESSMENT AND PLAN:  Symptomatic paroxysmal AV block s/p pacemaker  Patient is significant pulmonary quality of life.  He is a pleased with clinical outcome.  He will continue follow-up with the patient      #2 hyperlipidemia  Continue statin    #3 dilated ascending aorta follows with Dr. Silverman   Continue statin continue metoprolol  Abnormal stress test evaluated with Dr. Serrano waiting for cardiac catheterization     I spent 16 minutes caring for Ayan on this date of service. This time includes time spent by me of counseling/coordination of care as relates to the presenting problem and any ordered procedures/tests as outlined above.         This document has been electronically signed by Win Martinez MD on April 19, 2023 09:14 CDT        Diagnoses and all orders for this visit:    1. AV block, Mobitz II (Primary)    2. Aneurysm of ascending aorta without rupture (HCC) -Dr. Silverman    3. Mixed hyperlipidemia          Win Martinez MD  4/19/2023  08:56 CDT

## 2023-04-21 ENCOUNTER — OFFICE VISIT (OUTPATIENT)
Dept: ORTHOPEDIC SURGERY | Facility: CLINIC | Age: 63
End: 2023-04-21
Payer: COMMERCIAL

## 2023-04-21 VITALS — HEIGHT: 71 IN | WEIGHT: 208 LBS | BODY MASS INDEX: 29.12 KG/M2

## 2023-04-21 DIAGNOSIS — M75.111 PARTIAL NONTRAUMATIC TEAR OF ROTATOR CUFF, RIGHT: ICD-10-CM

## 2023-04-21 DIAGNOSIS — G89.29 CHRONIC RIGHT SHOULDER PAIN: Primary | ICD-10-CM

## 2023-04-21 DIAGNOSIS — M25.511 CHRONIC RIGHT SHOULDER PAIN: Primary | ICD-10-CM

## 2023-04-21 DIAGNOSIS — M19.011: ICD-10-CM

## 2023-04-21 RX ORDER — LIDOCAINE HYDROCHLORIDE 10 MG/ML
2 INJECTION, SOLUTION INFILTRATION; PERINEURAL
Status: COMPLETED | OUTPATIENT
Start: 2023-04-21 | End: 2023-04-21

## 2023-04-21 RX ORDER — TRIAMCINOLONE ACETONIDE 40 MG/ML
40 INJECTION, SUSPENSION INTRA-ARTICULAR; INTRAMUSCULAR
Status: COMPLETED | OUTPATIENT
Start: 2023-04-21 | End: 2023-04-21

## 2023-04-21 RX ORDER — MELOXICAM 15 MG/1
15 TABLET ORAL DAILY
COMMUNITY

## 2023-04-21 RX ADMIN — TRIAMCINOLONE ACETONIDE 40 MG: 40 INJECTION, SUSPENSION INTRA-ARTICULAR; INTRAMUSCULAR at 09:13

## 2023-04-21 RX ADMIN — LIDOCAINE HYDROCHLORIDE 2 ML: 10 INJECTION, SOLUTION INFILTRATION; PERINEURAL at 09:13

## 2023-04-21 NOTE — PROGRESS NOTES
"Ayan Shipman is a 62 y.o. male returns for     Chief Complaint   Patient presents with   • Right Shoulder - Follow-up       HISTORY OF PRESENT ILLNESS:      Mr. Shipman is a 62-year-old male who presents today requesting repeat subacromial injection.  Injections have helped chronic shoulder pain previously.  He states he is not currently interested in surgical management.  He denies injuries or traumas.  No changes in symptoms.  No fever, nausea, vomiting.     CONCURRENT MEDICAL HISTORY:    The following portions of the patient's history were reviewed and updated as appropriate: allergies, current medications, past family history, past medical history, past social history, past surgical history and problem list.     ROS  No fevers or chills.  No chest pain or shortness of air.  No GI or  disturbances.  Right shoulder pain.    PHYSICAL EXAMINATION:       Ht 180.3 cm (71\")   Wt 94.3 kg (208 lb)   BMI 29.01 kg/m²     Physical Exam  Vitals and nursing note reviewed.   Constitutional:       General: He is not in acute distress.     Appearance: He is well-developed. He is not toxic-appearing.   HENT:      Head: Normocephalic.   Pulmonary:      Effort: Pulmonary effort is normal. No respiratory distress.   Skin:     General: Skin is warm and dry.   Neurological:      Mental Status: He is alert and oriented to person, place, and time.   Psychiatric:         Behavior: Behavior normal.         Thought Content: Thought content normal.         Judgment: Judgment normal.         GAIT:     [x]  Normal  []  Antalgic    Assistive device: [x]  None  []  Walker     []  Crutches  []  Cane     []  Wheelchair  []  Stretcher    Right Shoulder Exam     Tenderness   The patient is experiencing tenderness in the acromion and acromioclavicular joint.    Muscle Strength   Supraspinatus: 4/5     Other   Erythema: absent  Sensation: normal  Pulse: present    Comments:  Pain and limitations with arc of motion similar to previous " exam.  No evidence of infection.                PROCEDURE: XR SHOULDER 2+ VW RIGHT     VIEWS: 3     INDICATION: Pain     COMPARISON: None     FINDINGS:     - fracture: None    - alignment: Within normal limits    - misc: Degenerative changes of the glenohumeral joint with  large osteophytes. Milder degenerative changes of the  acromioclavicular joint present.           IMPRESSION:  Osteoarthrosis of glenohumeral and acromioclavicular  joints..        Note:  if pain or symptoms persist beyond reasonable expectations     and follow-up imaging is anticipated,  cross sectional imaging   (CT and/or MRI) is suggested, as is deemed clinically   appropriate.     Electronically signed by:  Catie Linder MD  8/10/2022 11:54 AM  CDT Workstation: [id number]        ASSESSMENT:    Diagnoses and all orders for this visit:    Chronic right shoulder pain    Arthrosis of shoulder, right    Partial nontraumatic tear of rotator cuff, right    Other orders  -     Large Joint Arthrocentesis  -     meloxicam (MOBIC) 15 MG tablet; Take 1 tablet by mouth Daily.          PLAN    Large Joint Arthrocentesis: R subacromial bursa  Date/Time: 4/21/2023 9:13 AM  Consent given by: patient  Site marked: site marked  Timeout: Immediately prior to procedure a time out was called to verify the correct patient, procedure, equipment, support staff and site/side marked as required   Supporting Documentation  Indications: pain   Procedure Details  Location: shoulder - R subacromial bursa  Preparation: Patient was prepped and draped in the usual sterile fashion  Needle size: 22 G  Approach: posterior  Medications administered: 40 mg triamcinolone acetonide 40 MG/ML; 2 mL lidocaine 1 %  Patient tolerance: patient tolerated the procedure well with no immediate complications        Risk, benefits, alternatives to repeat subacromial  injection explained.  Patient verbalized understanding and tolerated injection well.  Post injection site care, including signs  and symptoms to report and when to seek care explained.  Patient verbalized understanding.    RTO in 3 months or as needed    EMR Dragon/Transciption Disclaimer: Some of this note may be an electronic transcription/translation of spoken language to printed text.  The electronic translation of spoken language may permit erroneous, or at times, nonsensical words or phrases to be inadvertently transcribed. Although I have reviewed the note for such errors, some may still exist.       This document has been electronically signed by Mary SANABRIA on April 21, 2023 12:54 CDT

## 2023-04-25 ENCOUNTER — HOSPITAL ENCOUNTER (OUTPATIENT)
Facility: HOSPITAL | Age: 63
Setting detail: HOSPITAL OUTPATIENT SURGERY
Discharge: HOME | End: 2023-04-25
Attending: INTERNAL MEDICINE | Admitting: INTERNAL MEDICINE
Payer: COMMERCIAL

## 2023-04-25 VITALS
BODY MASS INDEX: 29.01 KG/M2 | RESPIRATION RATE: 18 BRPM | TEMPERATURE: 97.7 F | HEART RATE: 63 BPM | SYSTOLIC BLOOD PRESSURE: 104 MMHG | OXYGEN SATURATION: 96 % | HEIGHT: 71 IN | DIASTOLIC BLOOD PRESSURE: 57 MMHG | WEIGHT: 207.23 LBS

## 2023-04-25 DIAGNOSIS — R07.2 PRECORDIAL PAIN: ICD-10-CM

## 2023-04-25 LAB
ANION GAP SERPL CALCULATED.3IONS-SCNC: 11 MMOL/L (ref 5–15)
BUN SERPL-MCNC: 20 MG/DL (ref 8–23)
BUN/CREAT SERPL: 19.6 (ref 7–25)
CALCIUM SPEC-SCNC: 8.9 MG/DL (ref 8.6–10.5)
CHLORIDE SERPL-SCNC: 105 MMOL/L (ref 98–107)
CO2 SERPL-SCNC: 26 MMOL/L (ref 22–29)
CREAT SERPL-MCNC: 1.02 MG/DL (ref 0.76–1.27)
DEPRECATED RDW RBC AUTO: 43.8 FL (ref 37–54)
EGFRCR SERPLBLD CKD-EPI 2021: 82.1 ML/MIN/1.73
ERYTHROCYTE [DISTWIDTH] IN BLOOD BY AUTOMATED COUNT: 12.8 % (ref 12.3–15.4)
GLUCOSE SERPL-MCNC: 108 MG/DL (ref 65–99)
HCT VFR BLD AUTO: 42.2 % (ref 37.5–51)
HGB BLD-MCNC: 14.6 G/DL (ref 13–17.7)
INR PPP: 1.05 (ref 0.8–1.2)
MCH RBC QN AUTO: 32.1 PG (ref 26.6–33)
MCHC RBC AUTO-ENTMCNC: 34.6 G/DL (ref 31.5–35.7)
MCV RBC AUTO: 92.7 FL (ref 79–97)
PLATELET # BLD AUTO: 204 10*3/MM3 (ref 140–450)
PMV BLD AUTO: 8.8 FL (ref 6–12)
POTASSIUM SERPL-SCNC: 4 MMOL/L (ref 3.5–5.2)
PROTHROMBIN TIME: 13.7 SECONDS (ref 11.1–15.3)
RBC # BLD AUTO: 4.55 10*6/MM3 (ref 4.14–5.8)
SODIUM SERPL-SCNC: 142 MMOL/L (ref 136–145)
WBC NRBC COR # BLD: 8.34 10*3/MM3 (ref 3.4–10.8)

## 2023-04-25 PROCEDURE — 80048 BASIC METABOLIC PNL TOTAL CA: CPT | Performed by: INTERNAL MEDICINE

## 2023-04-25 PROCEDURE — C1894 INTRO/SHEATH, NON-LASER: HCPCS | Performed by: INTERNAL MEDICINE

## 2023-04-25 PROCEDURE — 93458 L HRT ARTERY/VENTRICLE ANGIO: CPT | Performed by: INTERNAL MEDICINE

## 2023-04-25 PROCEDURE — 25510000001 IOPAMIDOL PER 1 ML: Performed by: INTERNAL MEDICINE

## 2023-04-25 PROCEDURE — 85610 PROTHROMBIN TIME: CPT | Performed by: INTERNAL MEDICINE

## 2023-04-25 PROCEDURE — 25010000002 MIDAZOLAM PER 1 MG: Performed by: INTERNAL MEDICINE

## 2023-04-25 PROCEDURE — 99152 MOD SED SAME PHYS/QHP 5/>YRS: CPT | Performed by: INTERNAL MEDICINE

## 2023-04-25 PROCEDURE — 25010000002 HEPARIN (PORCINE) PER 1000 UNITS: Performed by: INTERNAL MEDICINE

## 2023-04-25 PROCEDURE — C1769 GUIDE WIRE: HCPCS | Performed by: INTERNAL MEDICINE

## 2023-04-25 PROCEDURE — 25010000002 FENTANYL CITRATE (PF) 50 MCG/ML SOLUTION: Performed by: INTERNAL MEDICINE

## 2023-04-25 PROCEDURE — 85027 COMPLETE CBC AUTOMATED: CPT | Performed by: INTERNAL MEDICINE

## 2023-04-25 RX ORDER — SODIUM CHLORIDE 9 MG/ML
50 INJECTION, SOLUTION INTRAVENOUS CONTINUOUS
Status: ACTIVE | OUTPATIENT
Start: 2023-04-25 | End: 2023-04-25

## 2023-04-25 RX ORDER — ACETAMINOPHEN 325 MG/1
650 TABLET ORAL EVERY 4 HOURS PRN
Status: DISCONTINUED | OUTPATIENT
Start: 2023-04-25 | End: 2023-04-25 | Stop reason: HOSPADM

## 2023-04-25 RX ORDER — MIDAZOLAM HYDROCHLORIDE 1 MG/ML
INJECTION INTRAMUSCULAR; INTRAVENOUS
Status: DISCONTINUED | OUTPATIENT
Start: 2023-04-25 | End: 2023-04-25 | Stop reason: HOSPADM

## 2023-04-25 RX ORDER — ASPIRIN 81 MG/1
81 TABLET, CHEWABLE ORAL ONCE
Status: COMPLETED | OUTPATIENT
Start: 2023-04-25 | End: 2023-04-25

## 2023-04-25 RX ORDER — SODIUM CHLORIDE 9 MG/ML
1 INJECTION, SOLUTION INTRAVENOUS CONTINUOUS
Status: DISCONTINUED | OUTPATIENT
Start: 2023-04-25 | End: 2023-04-25 | Stop reason: HOSPADM

## 2023-04-25 RX ORDER — SODIUM CHLORIDE 0.9 % (FLUSH) 0.9 %
3 SYRINGE (ML) INJECTION EVERY 12 HOURS SCHEDULED
Status: DISCONTINUED | OUTPATIENT
Start: 2023-04-25 | End: 2023-04-25 | Stop reason: HOSPADM

## 2023-04-25 RX ORDER — FENTANYL CITRATE 50 UG/ML
INJECTION, SOLUTION INTRAMUSCULAR; INTRAVENOUS
Status: DISCONTINUED | OUTPATIENT
Start: 2023-04-25 | End: 2023-04-25 | Stop reason: HOSPADM

## 2023-04-25 RX ORDER — SODIUM CHLORIDE 0.9 % (FLUSH) 0.9 %
10 SYRINGE (ML) INJECTION AS NEEDED
Status: DISCONTINUED | OUTPATIENT
Start: 2023-04-25 | End: 2023-04-25 | Stop reason: HOSPADM

## 2023-04-25 RX ORDER — LIDOCAINE HYDROCHLORIDE 20 MG/ML
INJECTION, SOLUTION INFILTRATION; PERINEURAL
Status: DISCONTINUED | OUTPATIENT
Start: 2023-04-25 | End: 2023-04-25 | Stop reason: HOSPADM

## 2023-04-25 RX ORDER — HEPARIN SODIUM 1000 [USP'U]/ML
INJECTION, SOLUTION INTRAVENOUS; SUBCUTANEOUS
Status: DISCONTINUED | OUTPATIENT
Start: 2023-04-25 | End: 2023-04-25 | Stop reason: HOSPADM

## 2023-04-25 RX ORDER — NITROGLYCERIN 5 MG/ML
INJECTION, SOLUTION INTRAVENOUS
Status: DISCONTINUED | OUTPATIENT
Start: 2023-04-25 | End: 2023-04-25 | Stop reason: HOSPADM

## 2023-04-25 RX ORDER — ASPIRIN 81 MG/1
81 TABLET ORAL DAILY
Status: DISCONTINUED | OUTPATIENT
Start: 2023-04-26 | End: 2023-04-25 | Stop reason: HOSPADM

## 2023-04-25 RX ADMIN — SODIUM CHLORIDE 50 ML/HR: 9 INJECTION, SOLUTION INTRAVENOUS at 07:04

## 2023-04-25 RX ADMIN — ASPIRIN 81 MG: 81 TABLET, CHEWABLE ORAL at 07:04

## 2023-04-25 NOTE — BRIEF OP NOTE
Cardiac cath showed mild nonobstructive coronary artery disease  Normal left-sided filling pressures    TR band per protocol

## 2023-04-25 NOTE — H&P
[facility]  HISTORY AND PHYSICAL  Ayan Shipman  62 y.o. male    Chief complaint -  Chest pain and abnormal stress test    History of Present Illness:    This is a 62-year-old gentleman history of hypertension, hyperlipidemia, ascending aortic aneurysm, high degree AV block status post permanent pacemaker who was seen by Dr. Martinez for intermittent chest pain and shortness of breath.  Echocardiogram showed preserved LV systolic function.  Mild calcification noted on aortic valve.    Nuclear stress test was concerning for ischemia inferior lateral anterior wall.  She was recommended cardiac cath for definitive evaluation for for coronary artery disease.      Interpretation Summary       •  Left ventricular ejection fraction is normal (Calculated EF = 65%).  •  Diaphragmatic attenuation and GI artifacts are present.  •  Findings consistent with a normal ECG stress test by EKG criteria  •  Decreased tracer uptake noted in the inferior wall, lateral wall and anterior wall on stress images.  Since the image quality was poor it was difficult to establish if this represented true ischemia.  Consider CT angiogram of the coronary arteries or cardiac catheterization if clinically indicated.      No Known Allergies      Past Medical History:   Diagnosis Date   • Aortic aneurysm    • Asthma 2019    Restricted Left Nostril   • Class 1 obesity due to excess calories with serious comorbidity and body mass index (BMI) of 30.0 to 30.9 in adult 03/08/2021   • Erectile dysfunction 02/26/2019   • GERD (gastroesophageal reflux disease)    • Hammertoes of both feet 03/08/2021   • Hemorrhoids    • Hypercholesterolemia    • Impaired fasting glucose 03/08/2021   • Impotence    • Overweight (BMI 25.0-29.9) 02/26/2019   • Palpitations    • Primary osteoarthritis involving multiple joints 10/08/2021   • Primary osteoarthritis of both knees 02/26/2019   • Shoulder pain     Sprain of shoulder and upper arm -  right; improved            Past Surgical History:   Procedure Laterality Date   • CARDIAC ELECTROPHYSIOLOGY PROCEDURE Left 03/16/2023    Procedure: Pacemaker DC new;  Surgeon: Win Martinez MD;  Location: [facility] CATH INVASIVE LOCATION;  Service: Cardiology;  Laterality: Left;   • CARDIAC SURGERY  3/16/23    Pacemaker   • COLONOSCOPY N/A 07/02/2021    Procedure: COLONOSCOPY;  Surgeon: Kyle Thakur DO;  Location: [facility] ENDOSCOPY;  Service: Gastroenterology;  Laterality: N/A;   • ENDOSCOPY AND COLONOSCOPY  03/17/2010    Internal grade I hemorrhoids found in the anus   • HERNIA REPAIR     • INJECTION OF MEDICATION  07/08/2013    Kenalog (3)      • KNEE SURGERY           Family History   Problem Relation Age of Onset   • Diabetes Other    • Heart disease Other    • Hypertension Other    • Arthritis Other    • Stroke Mother    • Arthritis Mother    • Hyperlipidemia Mother    • Hypertension Mother    • Osteoarthritis Mother    • ALS Father    • Early death Father         ALS   • Diabetes Brother    • Heart disease Brother    • Cancer Brother         Skin   • Diabetes Brother    • Heart disease Brother          Social History     Socioeconomic History   • Marital status:    Tobacco Use   • Smoking status: Never     Passive exposure: Never   • Smokeless tobacco: Never   Vaping Use   • Vaping Use: Never used   Substance and Sexual Activity   • Alcohol use: Never   • Drug use: Never   • Sexual activity: Not Currently         Prior to Admission medications    Medication Sig Start Date End Date Taking? Authorizing Provider   acetaminophen (TYLENOL) 500 MG tablet Take 1 tablet by mouth Every 6 (Six) Hours As Needed for Mild Pain.   Yes Alton Mcmillan MD   Ascorbic Acid (VITAMIN C PO) Take  by mouth.   Yes Alton Mcmillan MD   Coenzyme Q-10 100 MG capsule Take 1 capsule by mouth Daily.   Yes Mo Marques MD   meloxicam (MOBIC) 15 MG tablet Take 1 tablet by mouth Daily.   Yes Alton Mcmillan MD  "  metoprolol succinate XL (TOPROL-XL) 25 MG 24 hr tablet Take 1 tablet by mouth Daily. 4/19/23  Yes Win Martinez MD   Nutritional Supplements (VITAMIN D BOOSTER PO) Take  by mouth.   Yes Alton Mcmillan MD   rosuvastatin (Crestor) 10 MG tablet Take 1 tablet by mouth Every Other Day. 3/22/23  Yes Mo Marques MD   zinc sulfate (ZINCATE) 220 (50 Zn) MG capsule Take 1 capsule by mouth Daily.   Yes Alton Mcmillan MD   Zoster Vac Recomb Adjuvanted 50 MCG/0.5ML reconstituted suspension Inject 0.5 mL into the appropriate muscle as directed by prescriber Every 2 (Two) Months. 3/22/23  Yes Mo Marques MD         Review of Systems:     Constitution: Denies any fatigue, fever or chills.  HENT: Denies any headache, hearing impairment.  Eyes: Denies any blurring of vision, or photophobia.  Cardiovascular:  As per history of present illness.   Respiratory system: Denies any COPD, shortness of breath.  Endocrine:  No history of hyperlipidemia, diabetes.  Musculoskeletal:  No history of arthritis with musculoskeletal problems.  Gastrointestinal: No nausea, vomiting, or melena.  Genitourinary: No dysuria or hematuria.  Neurological: No history of seizure disorder, stroke, or memory problems.    Psychiatric/Behavioral: No history of depression, bipolar disorder or schizophrenia .    Hematological: No history of easy bruising.    ROS          OBJECTIVE:    /83   Pulse 65   Temp 97.5 °F (36.4 °C)   Resp 18   Ht 180.3 cm (71\")   Wt 94 kg (207 lb 3.7 oz)   SpO2 99%   BMI 28.90 kg/m²       Physical Exam:   Constitutional: Patient is oriented to person, place, and time.   Skin: Warm and dry.  Well developed and nourished in no acute distress .  Head: Normocephalic and atraumatic.   Eyes: Pupils are equal, round, and reactive to light.   Neck: Neck supple. No bruit in the carotids, no elevation of JVD.  Cardiovascular: Cabins in the fifth intercostal space, regular rate, and rhythm,  S1 greater than S2, no " S3 or S4, no gallop.  Pulmonary/Chest: Air entry is equal on both sides.  No wheezing or crackles.  Abdominal: Soft.  No hepatosplenomegaly, bowel sounds are present.  Musculoskeletal: No kyphoscoliosis.  Neurological: Alert and oriented to person, place, and time.  Cranial nerves are intact. No motor or sensory deficit.  Extremities: No edema, no radial femoral delay.  Psychiatric: Normal mood and affect. Behavior is normal.      Lab Results (last 24 hours)     Procedure Component Value Units Date/Time    CBC (No Diff) [[id number]]  (Normal) Collected: 04/25/23 0649    Specimen: Blood Updated: 04/25/23 0655     WBC 8.34 10*3/mm3      RBC 4.55 10*6/mm3      Hemoglobin 14.6 g/dL      Hematocrit 42.2 %      MCV 92.7 fL      MCH 32.1 pg      MCHC 34.6 g/dL      RDW 12.8 %      RDW-SD 43.8 fl      MPV 8.8 fL      Platelets 204 10*3/mm3     Basic Metabolic Panel [[id number]]  (Abnormal) Collected: 04/25/23 0649    Specimen: Blood Updated: 04/25/23 0713     Glucose 108 mg/dL      BUN 20 mg/dL      Creatinine 1.02 mg/dL      Sodium 142 mmol/L      Potassium 4.0 mmol/L      Comment: Slight hemolysis detected by analyzer. Results may be affected.        Chloride 105 mmol/L      CO2 26.0 mmol/L      Calcium 8.9 mg/dL      BUN/Creatinine Ratio 19.6     Anion Gap 11.0 mmol/L      eGFR 82.1 mL/min/1.73     Narrative:      GFR Normal >60  Chronic Kidney Disease <60  Kidney Failure <15      Protime-INR [[id number]]  (Normal) Collected: 04/25/23 0649    Specimen: Blood Updated: 04/25/23 0711     Protime 13.7 Seconds      INR 1.05    Narrative:      Therapeutic range for most indications is 2.0-3.0 INR,  or 2.5-3.5 for mechanical heart valves.          Results for orders placed during the hospital encounter of 03/14/23    Adult Transthoracic Echo Complete w/ Color, Spectral and Contrast if Necessary Per Protocol    Interpretation Summary  •  Left ventricular ejection fraction appears to be 61 - 65%.  •  Left ventricular diastolic  dysfunction is noted.  •  There is calcification of the aortic valve.  •  Estimated right ventricular systolic pressure from tricuspid regurgitation is normal (<35 mmHg).  •  Mild dilation of the aortic root is present. Moderate dilation of the proximal aorta is present.      The 10-year ASCVD risk score (Santa DRISCOLL, et al., 2019) is: 14.9%    Values used to calculate the score:      Age: 62 years      Sex: Male      Is Non- : No      Diabetic: No      Tobacco smoker: No      Systolic Blood Pressure: 135 mmHg      Is BP treated: Yes      HDL Cholesterol: 49 mg/dL      Total Cholesterol: 195 mg/dL          A/P:  [facility] Pre-Op:    Invasive coronary angiography was recommended to the patient.  The patientdenies  bleeding issues. The patient reports use of antiplatelet agents.  The patient denies  CKD. The patient denies  contrast allergy. The patient denies  use of diabetes medications.    Pre-Cath Surgical History: NA    The indications, risks/benefits and alternatives of diagnostic left heart cardiac catheterization, angiography, conscious sedation, and possible blood transfusion were discussed in detail with the patient. The potential complications of 1/2000 chance of death, 1/1000 chance of heart attack or stroke, 1/500 chance of bleeding or clotting of the femoral artery, and 1/500 chance of allergic reaction to contrast were discussed. We also reviewed possible complications of infection and kidney dysfunction. If PCI were performed and intra-coronary stents indicated, we discussed the details about MADELYN. This included a review of the risks of the infrequent, but relatively higher incidence of late thrombosis with MADELYN. The importance of maintaining a consistent daily regimen of aspirin and an additional anti-platelet agent for as long as directed after implantation was emphasized. No contraindications were found. The patient  appeared to understand and agreed to the above.  -Left heart  catheterization, Coronary angiography, Graft angiography, In-situ LIMA angiography, Left ventriculography, Intravascular ultrasound, Optical coherence tomography, Flow wire, Balloon Angioplasty, Coronary stent, Graft stent, Aortography, Iliofemoral angiography, Device closure, femoral artery, Intra-aortic balloon pump, Impella LV assist device implantation, Transvenous pacemaker, Pericardiocentesis and Subclavian angiography    ASA Class: III  Mallampati Score: II    Contraindications to DAPT: None          BMI is >= 25 and <30. (Overweight) The following options were offered after discussion;: exercise counseling/recommendations      Ayan Shipman  reports that he has never smoked. He has never been exposed to tobacco smoke. He has never used smokeless tobacco..    Christianne Serrano MD  4/25/2023  08:08 CDT      Part of this note may be an electronic transcription/translation of spoken language to printed text using the Dragon Dictation System.

## 2023-04-28 ENCOUNTER — DOCUMENTATION (OUTPATIENT)
Dept: CARDIAC REHAB | Facility: HOSPITAL | Age: 63
End: 2023-04-28
Payer: COMMERCIAL

## 2023-05-04 ENCOUNTER — OFFICE VISIT (OUTPATIENT)
Dept: FAMILY MEDICINE CLINIC | Facility: CLINIC | Age: 63
End: 2023-05-04
Payer: COMMERCIAL

## 2023-05-04 VITALS
BODY MASS INDEX: 28.42 KG/M2 | TEMPERATURE: 98.3 F | OXYGEN SATURATION: 97 % | SYSTOLIC BLOOD PRESSURE: 118 MMHG | HEART RATE: 60 BPM | HEIGHT: 71 IN | WEIGHT: 203 LBS | DIASTOLIC BLOOD PRESSURE: 78 MMHG

## 2023-05-04 DIAGNOSIS — M75.101 ROTATOR CUFF SYNDROME OF RIGHT SHOULDER: Chronic | ICD-10-CM

## 2023-05-04 DIAGNOSIS — E78.2 MIXED HYPERLIPIDEMIA: Primary | Chronic | ICD-10-CM

## 2023-05-04 DIAGNOSIS — E66.3 OVERWEIGHT (BMI 25.0-29.9): Chronic | ICD-10-CM

## 2023-05-04 DIAGNOSIS — I71.21 ANEURYSM OF ASCENDING AORTA WITHOUT RUPTURE: Chronic | ICD-10-CM

## 2023-05-04 DIAGNOSIS — I25.10 CORONARY ARTERY DISEASE INVOLVING NATIVE CORONARY ARTERY OF NATIVE HEART WITHOUT ANGINA PECTORIS: Chronic | ICD-10-CM

## 2023-05-04 DIAGNOSIS — I10 ESSENTIAL HYPERTENSION: Chronic | ICD-10-CM

## 2023-05-04 RX ORDER — EZETIMIBE 10 MG/1
10 TABLET ORAL DAILY
Qty: 90 TABLET | Refills: 3 | Status: SHIPPED | OUTPATIENT
Start: 2023-05-04

## 2023-05-04 RX ORDER — ASPIRIN 81 MG/1
81 TABLET ORAL DAILY
COMMUNITY

## 2023-05-04 RX ORDER — METOPROLOL SUCCINATE 25 MG/1
25 TABLET, EXTENDED RELEASE ORAL
Qty: 90 TABLET | Refills: 3 | Status: SHIPPED | OUTPATIENT
Start: 2023-05-04

## 2023-05-04 RX ORDER — PREDNISONE 20 MG/1
20 TABLET ORAL EVERY MORNING
Qty: 10 TABLET | Refills: 0 | Status: SHIPPED | OUTPATIENT
Start: 2023-05-04 | End: 2023-05-14

## 2023-05-04 NOTE — PROGRESS NOTES
Chief Complaint  Work release , Coronary Artery Disease, Hyperlipidemia, and Shoulder Pain    Subjective        Ayan Shipman presents to [facility] [place]  History of Present Illness    Ayan is here to assess multiple issues.  Recent LHC by Dr. Serrano reveals relatively mild multivessel CAD.  No intervention was required.  Aggressive medical management is recommended.  Beta-blocker has been added.  He was unable to tolerate Crestor daily, but is adequately tolerating Crestor 4 days weekly, taking co-Q10 daily.  With the discovery of CAD, I recommend a goal of getting his LDL cholesterol in the 50-70 range.  I recommend adding Zetia to Crestor.  He is in agreement.    He has experienced no further syncopal or presyncopal episodes now that the pacemaker is in place and working properly.  Dr. Martinez will follow that.  He is tolerating the beta-blocker well with very good blood pressure control today.    He has been off work recovering from his cardiac issues and right rotator cuff syndrome.  KANNAN Yanez recently injected the right shoulder again, but without much change in symptoms this time.  He continues the HEP.  He is using Mobic QOD, as we recommended avoiding frequent or daily NSAIDs in light of his cardiac issues.  He is describing some symptoms of cervical DJD with numbness sensation radiating down the right arm involving the fourth and fifth fingers.  No uncontrolled pain and no weakness in the hands or arms.  No radicular symptoms to the left.  This has flared recently and is partially improving.  Last 2 chiropractic manipulations seemed to aggravate symptoms.  He has stopped chiropractic manipulation.    Dr. Silverman will be following the mild asymptomatic ascending aortic aneurysm.  He is on appropriate risk factor modification.  We provided reassurance regarding this issue, reinforcing the need for annual surveillance.    He feels that he will soon  "be able to return to work, which is his goal.  He plans on retiring sometime around achieving age 63.    Objective   Vital Signs:  /78 (BP Location: Left arm, Patient Position: Sitting, Cuff Size: Large Adult)   Pulse 60 Comment: regular  Temp 98.3 °F (36.8 °C) (Tympanic)   Ht 180.3 cm (71\")   Wt 92.1 kg (203 lb)   SpO2 97%   BMI 28.31 kg/m²   Estimated body mass index is 28.31 kg/m² as calculated from the following:    Height as of this encounter: 180.3 cm (71\").    Weight as of this encounter: 92.1 kg (203 lb).             Physical Exam  Vitals reviewed.   Constitutional:       General: He is not in acute distress.     Appearance: Normal appearance. He is well-developed.      Comments: Very pleasant male, overweight.     HENT:      Head: Normocephalic and atraumatic.      Nose:      Right Sinus: No maxillary sinus tenderness or frontal sinus tenderness.      Left Sinus: No maxillary sinus tenderness or frontal sinus tenderness.      Mouth/Throat:      Mouth: No oral lesions.      Pharynx: Uvula midline.      Tonsils: No tonsillar exudate.   Eyes:      Conjunctiva/sclera: Conjunctivae normal.      Pupils: Pupils are equal, round, and reactive to light.   Neck:      Thyroid: No thyroid mass or thyromegaly.      Vascular: No carotid bruit or JVD.      Trachea: Trachea normal. No tracheal deviation.   Cardiovascular:      Rate and Rhythm: Normal rate and regular rhythm.  No extrasystoles are present.     Chest Wall: PMI is not displaced.      Heart sounds: Normal heart sounds. No murmur heard.     Comments: Pacemaker under left clavicle  Pulmonary:      Effort: Pulmonary effort is normal. No accessory muscle usage or respiratory distress.      Breath sounds: Normal breath sounds. No decreased breath sounds, wheezing, rhonchi or rales.   Abdominal:      General: Bowel sounds are normal. There is no distension.      Palpations: Abdomen is soft.      Tenderness: There is no abdominal tenderness. "   Musculoskeletal:      Cervical back: Neck supple.      Comments: There is some increased muscle tension right posterior neck region.  Shoulder ROM is still about the same, but less painful   Lymphadenopathy:      Cervical: No cervical adenopathy.   Skin:     General: Skin is warm and dry.      Findings: No rash.      Nails: There is no clubbing.   Neurological:      General: No focal deficit present.      Mental Status: He is alert and oriented to person, place, and time. Mental status is at baseline.      Cranial Nerves: No cranial nerve deficit.      Coordination: Coordination normal.   Psychiatric:         Mood and Affect: Mood normal.         Speech: Speech normal.         Behavior: Behavior normal.         Thought Content: Thought content normal.         Judgment: Judgment normal.        Result Review :    Common labs        3/16/2023    04:17 3/16/2023    08:57 3/17/2023    06:36 4/25/2023    06:49   Common Labs   Glucose 105   120   104   108     BUN 13   12   12   20     Creatinine 0.88   0.88   0.78   1.02     Sodium 140   139   138   142     Potassium 4.7   3.9   3.9   4.0     Chloride 106   105   105   105     Calcium 8.6   8.7   8.7   8.9     Albumin 3.9    3.9      Total Bilirubin 0.6    0.8      Alkaline Phosphatase 40    40      AST (SGOT) 21    18      ALT (SGPT) 17    14      WBC 6.95   5.81   7.99   8.34     Hemoglobin 14.8   15.5   15.3   14.6     Hematocrit 42.3   44.9   43.5   42.2     Platelets 199   207   185   204       Data reviewed: Cardiology studies Recent [facility]             Assessment and Plan   Diagnoses and all orders for this visit:    1. Mixed hyperlipidemia (Primary)    2. Coronary artery disease involving native coronary artery of native heart without angina pectoris -Dr. Serrano    3. Essential hypertension    4. Overweight (BMI 25.0-29.9)    5. Aneurysm of ascending aorta without rupture (HCC) -Dr. Silverman    6. Rotator cuff syndrome of right shoulder -partial supraspinatus tear.   Dr. Mccabe    Other orders  -     metoprolol succinate XL (TOPROL-XL) 25 MG 24 hr tablet; Take 1 tablet by mouth Daily.  Dispense: 90 tablet; Refill: 3  -     ezetimibe (Zetia) 10 MG tablet; Take 1 tablet by mouth Daily. For cholesterol  Dispense: 90 tablet; Refill: 3  -     predniSONE (DELTASONE) 20 MG tablet; Take 1 tablet by mouth Every Morning for 10 days.  Dispense: 10 tablet; Refill: 0    To help achieve LDL cholesterol goal of 50-70, add Zetia 10 mg daily.  Continue Crestor every other day, taking it 4 days each week.  Continue co-Q10 daily to help him tolerate Crestor.  Try to follow an improved low-fat/cholesterol diet and increase aerobic exercise to address overweight body status.  We discussed long-term conditioning goals.    Continue the current CAD risk factor modifications.  He has not yet been instructed by cardiology to take daily aspirin, but it appears that would be indicated.  Start aspirin 81 mg daily.  I have asked him to review aspirin therapy with cardiology when he follows up next month.    Blood pressure and heart rate are well controlled with metoprolol and pacemaker.  Continue to follow with Dr. Martinez as he recommends.    He continues to have some right shoulder issues, but also symptoms consistent with cervical DJD and right myelopathy.  I am reluctant for him to use Mobic more often than every other day due to his mild CAD.  Current/recent flare of symptoms is improving somewhat.  Take prednisone 20 mg each morning for up to the next 10 days.    Is mild ascending aortic aneurysm is stable.  Continue risk factor modifications as stated above.  Continue to follow with Dr. Silverman annually.    The patient will be ready to return to work without restrictions soon.  Return to work 5/22/2023 without restrictions.    Return to clinic in September as previously scheduled with fasting labs prior.       I spent 41 minutes caring for Ayan on this date of service. This time includes time spent  by me in the following activities:preparing for the visit, reviewing tests, obtaining and/or reviewing a separately obtained history, performing a medically appropriate examination and/or evaluation , counseling and educating the patient/family/caregiver, ordering medications, tests, or procedures and documenting information in the medical record  Follow Up   No follow-ups on file.  Patient was given instructions and counseling regarding his condition or for health maintenance advice. Please see specific information pulled into the AVS if appropriate.

## 2023-05-23 ENCOUNTER — OFFICE VISIT (OUTPATIENT)
Dept: OTOLARYNGOLOGY | Facility: CLINIC | Age: 63
End: 2023-05-23
Payer: COMMERCIAL

## 2023-05-23 VITALS — WEIGHT: 212 LBS | HEIGHT: 71 IN | BODY MASS INDEX: 29.68 KG/M2 | OXYGEN SATURATION: 98 %

## 2023-05-23 DIAGNOSIS — J34.2 NASAL SEPTAL DEFORMITY: ICD-10-CM

## 2023-05-23 DIAGNOSIS — J32.0 CHRONIC MAXILLARY SINUSITIS: Primary | ICD-10-CM

## 2023-05-23 DIAGNOSIS — J33.9 NASAL POLYPOSIS: ICD-10-CM

## 2023-05-23 NOTE — PROGRESS NOTES
Subjective   Ayan Shipman is a 62 y.o. male.       History of Present Illness   Patient was seen previously with a sizable polyp on the left as well as a septal deformity and clinical evidence of chronic sinusitis.  Was treated with Flonase prednisone and cefuroxime.  States she really did not notice this any improvement.  Still cannot breathe well through his nostril particularly on the left      The following portions of the patient's history were reviewed and updated as appropriate: allergies, current medications, past family history, past medical history, past social history, past surgical history and problem list.     reports that he has never smoked. He has never been exposed to tobacco smoke. He has never used smokeless tobacco. He reports that he does not drink alcohol and does not use drugs.   Patient is not a tobacco user and has not been counseled for use of tobacco products      Review of Systems        Objective   Physical Exam  Nares: Boggy mucosa, septum to the left.  Polyp noted posteriorly on anterior rhinoscopy.         Assessment and Plan   Diagnoses and all orders for this visit:    1. Chronic maxillary sinusitis (Primary)    2. Nasal septal deformity    3. Nasal polyposis           Plan: With failure to respond to medical therapy CT scan of the sinuses is indicated for consideration for surgical treatment.  He will be seen once the CT scan is available.

## 2023-06-06 RX ORDER — ZOSTER VACCINE RECOMBINANT, ADJUVANTED 50 MCG/0.5
KIT INTRAMUSCULAR
Qty: 1 ML | Refills: 1 | OUTPATIENT
Start: 2023-06-06

## 2023-06-13 ENCOUNTER — HOSPITAL ENCOUNTER (OUTPATIENT)
Dept: CT IMAGING | Facility: HOSPITAL | Age: 63
Discharge: HOME | End: 2023-06-13
Admitting: OTOLARYNGOLOGY
Payer: COMMERCIAL

## 2023-06-13 ENCOUNTER — OFFICE VISIT (OUTPATIENT)
Dept: OTOLARYNGOLOGY | Facility: CLINIC | Age: 63
End: 2023-06-13
Payer: COMMERCIAL

## 2023-06-13 ENCOUNTER — OFFICE VISIT (OUTPATIENT)
Dept: CARDIOLOGY | Facility: CLINIC | Age: 63
End: 2023-06-13
Payer: COMMERCIAL

## 2023-06-13 VITALS
BODY MASS INDEX: 28.82 KG/M2 | HEIGHT: 71 IN | OXYGEN SATURATION: 97 % | WEIGHT: 205.9 LBS | DIASTOLIC BLOOD PRESSURE: 80 MMHG | SYSTOLIC BLOOD PRESSURE: 120 MMHG | HEART RATE: 83 BPM

## 2023-06-13 VITALS — HEART RATE: 67 BPM | HEIGHT: 71 IN | WEIGHT: 208.8 LBS | OXYGEN SATURATION: 99 % | BODY MASS INDEX: 29.23 KG/M2

## 2023-06-13 DIAGNOSIS — J33.9 NASAL POLYPOSIS: ICD-10-CM

## 2023-06-13 DIAGNOSIS — I44.1 AV BLOCK, MOBITZ II: ICD-10-CM

## 2023-06-13 DIAGNOSIS — J32.0 CHRONIC MAXILLARY SINUSITIS: ICD-10-CM

## 2023-06-13 DIAGNOSIS — J34.89 NASAL CAVITY MASS: Primary | ICD-10-CM

## 2023-06-13 DIAGNOSIS — E78.2 MIXED HYPERLIPIDEMIA: Primary | Chronic | ICD-10-CM

## 2023-06-13 DIAGNOSIS — I10 ESSENTIAL HYPERTENSION: Chronic | ICD-10-CM

## 2023-06-13 DIAGNOSIS — J34.2 NASAL SEPTAL DEFORMITY: ICD-10-CM

## 2023-06-13 DIAGNOSIS — I25.10 CORONARY ARTERY DISEASE INVOLVING NATIVE CORONARY ARTERY OF NATIVE HEART WITHOUT ANGINA PECTORIS: Chronic | ICD-10-CM

## 2023-06-13 DIAGNOSIS — I71.21 ANEURYSM OF ASCENDING AORTA WITHOUT RUPTURE: Chronic | ICD-10-CM

## 2023-06-13 PROCEDURE — 99214 OFFICE O/P EST MOD 30 MIN: CPT | Performed by: OTOLARYNGOLOGY

## 2023-06-13 PROCEDURE — 70486 CT MAXILLOFACIAL W/O DYE: CPT

## 2023-06-13 RX ORDER — OXYMETAZOLINE HYDROCHLORIDE 0.05 G/100ML
2 SPRAY NASAL
OUTPATIENT
Start: 2023-06-13 | End: 2023-06-16

## 2023-06-13 NOTE — PROGRESS NOTES
Ayan Shipman  62 y.o. male    6/13/2023   1. Mixed hyperlipidemia    2. Coronary artery disease involving native coronary artery of native heart without angina pectoris -Dr. Serrano    3. Essential hypertension    4. Aneurysm of ascending aorta without rupture    5. AV block, Mobitz II        History of Present Illness:    62 years old patient with a history of symptomatic paroxysmal AV block with near syncope s/p pacemaker significant Promit, CAD under care of Dr. Serrano, aneurysm of ascending aorta followed with Dr. Silverman.  No orthopnea no PND no chest pain lightheaded dizziness or intermittent claudication reported.  Patient have an abnormal stress test subsequently started for the CT: Angiogram.  Nonobstructive CAD of RCA LAD and left circumflex system with preserved left and systolic function.  echocardiogram reported preserved left ventricle systolic function and moderately dilated ascending aorta subsequent evaluated by CT chest revealed aortic root dilated 4.5 proximal ascending aorta 4.8 mid and distal ascending aorta 4.4.  Patient follows with Dr. Silverman.        Stress test April 2023   Left ventricular ejection fraction is normal (Calculated EF = 65%).    Diaphragmatic attenuation and GI artifacts are present.    Findings consistent with a normal ECG stress test by EKG criteria    Decreased tracer uptake noted in the inferior wall, lateral wall and anterior wall on stress images.  Since the image quality was poor it was difficult to establish if this represented true ischemia.  Consider CT angiogram of the coronary arteries or cardiac catheterization if clinically indicated.    Date of Procedure: 03/16/23     Referring Physician:      /ELECTROPHYSIOLOGIST:     Dr. Martinez     PROCEDURE(S) PERFORMED:    Implantation of a dual-chamber permanent pacemaker.        INDICATIONS FOR PROCDEDURE:            Symptomatic high-grade AV block        CT chest March  2023    IMPRESSION:  Conclusion:  Aortic root dilated up to 4.5 cm.  Proximal ascending aorta dilated up to 4.8 cm.  Mid and distal ascending aorta dilated up to 4.4 cm.  No aortic dissection.  Coronary artery calcifications    Echo March 2023      Left ventricular ejection fraction appears to be 61 - 65%.    Left ventricular diastolic dysfunction is noted.    There is calcification of the aortic valve.    Estimated right ventricular systolic pressure from tricuspid regurgitation is normal (<35 mmHg).    Mild dilation of the aortic root is present. Moderate dilation of the proximal aorta is present      SUBJECTIVE:    No Known Allergies    Past Medical History:   Diagnosis Date    Aortic aneurysm     Asthma 2019    Restricted Left Nostril    Chronic maxillary sinusitis 6/13/2023    Class 1 obesity due to excess calories with serious comorbidity and body mass index (BMI) of 30.0 to 30.9 in adult 03/08/2021    Erectile dysfunction 02/26/2019    GERD (gastroesophageal reflux disease)     Hammertoes of both feet 03/08/2021    Hemorrhoids     Hypercholesterolemia     Impaired fasting glucose 03/08/2021    Impotence     Overweight (BMI 25.0-29.9) 02/26/2019    Palpitations     Primary osteoarthritis involving multiple joints 10/08/2021    Primary osteoarthritis of both knees 02/26/2019    Shoulder pain     Sprain of shoulder and upper arm - right; improved          Past Surgical History:   Procedure Laterality Date    CARDIAC CATHETERIZATION N/A 4/25/2023    Procedure: Left Heart Cath;  Surgeon: Christianne Serrano MD;  Location: [facility] CATH INVASIVE LOCATION;  Service: Cardiology;  Laterality: N/A;    CARDIAC ELECTROPHYSIOLOGY PROCEDURE Left 03/16/2023    Procedure: Pacemaker DC new;  Surgeon: Win Martinez MD;  Location: [facility] CATH INVASIVE LOCATION;  Service: Cardiology;  Laterality: Left;    CARDIAC SURGERY  3/16/23    Pacemaker    COLONOSCOPY N/A 07/02/2021    Procedure: COLONOSCOPY;  Surgeon: Kyle Thakur DO;   Location: [facility] ENDOSCOPY;  Service: Gastroenterology;  Laterality: N/A;    ENDOSCOPY AND COLONOSCOPY  03/17/2010    Internal grade I hemorrhoids found in the anus    HERNIA REPAIR      INJECTION OF MEDICATION  07/08/2013    Kenalog (3)       KNEE SURGERY         Family History   Problem Relation Age of Onset    Diabetes Other     Heart disease Other     Hypertension Other     Arthritis Other     Stroke Mother     Arthritis Mother     Hyperlipidemia Mother     Hypertension Mother     Osteoarthritis Mother     ALS Father     Early death Father         ALS    Diabetes Brother     Heart disease Brother     Cancer Brother         Skin    Diabetes Brother     Heart disease Brother        Social History     Socioeconomic History    Marital status:    Tobacco Use    Smoking status: Never     Passive exposure: Never    Smokeless tobacco: Never   Vaping Use    Vaping Use: Never used   Substance and Sexual Activity    Alcohol use: Never    Drug use: Never    Sexual activity: Not Currently       Current Outpatient Medications   Medication Sig Dispense Refill    acetaminophen (TYLENOL) 500 MG tablet Take 1 tablet by mouth Every 6 (Six) Hours As Needed for Mild Pain.      Ascorbic Acid (VITAMIN C PO) Take  by mouth.      aspirin 81 MG EC tablet Take 1 tablet by mouth Daily.      Coenzyme Q-10 100 MG capsule Take 1 capsule by mouth Daily.      ezetimibe (Zetia) 10 MG tablet Take 1 tablet by mouth Daily. For cholesterol 90 tablet 3    meloxicam (MOBIC) 15 MG tablet Take 1 tablet by mouth Daily.      metoprolol succinate XL (TOPROL-XL) 25 MG 24 hr tablet Take 1 tablet by mouth Daily. 90 tablet 3    Nutritional Supplements (VITAMIN D BOOSTER PO) Take  by mouth.      rosuvastatin (Crestor) 10 MG tablet Take 1 tablet by mouth Every Other Day. 45 tablet 3    zinc sulfate (ZINCATE) 220 (50 Zn) MG capsule Take 1 capsule by mouth Daily.      Zoster Vac Recomb Adjuvanted 50 MCG/0.5ML reconstituted suspension Inject 0.5 mL into the  "appropriate muscle as directed by prescriber Every 2 (Two) Months. 1 each 1     No current facility-administered medications for this visit.       Review of Systems:   Constitutional:  no change in exercise tolerance.  HENT: Denies any hearing loss, epistaxis  Eyes: No blurred  Respiratory:  Denies dyspnea with exertion,no cough or wheezing  Cardiovascular: See H&P  Gastrointestinal:  Denies change in bowel habits, dyspepsia, ulcer disease  Endocrine: Negative for cold intolerance, heat intolerance, polydipsia, polyphagia  Genitourinary: Negative.  Musculoskeletal: Denies any history of arthritic symptoms or back problems.   Skin:  Denies rashes or skin lesions.   Allergic/Immunologic: Negative.  Negative for environmental allergies  Neurological:  Denies any history of recurrent headaches   Hematological: No history of easy bruisability  Psychiatric/Behavioral: Denies any history of depression    OBJECTIVE:    /80 (BP Location: Left arm, Patient Position: Sitting, Cuff Size: Adult)   Pulse 83   Ht 180.3 cm (70.98\")   Wt 93.4 kg (205 lb 14.4 oz)   SpO2 97%   BMI 28.73 kg/m²     Physical Exam:   Constitutional: Cooperative, alert and oriented, well-developed, well-nourished, in no acute distress.   Head: Normocephalic, conjunctive is pink, thyroid is nonpalpable, no jugular is distention  Cardiovascular regular rate/rhythm, no S3, no pericardial rub  Pulmonary/Chest: Chest positive bilateral, good air entry, no rales, no wheezing  Abdominal: Abdomen soft, bowel sounds normoactive  Musculoskeletal: No deformities, positive peripheral pulses  Neurological: No gross motor or sensory deficits noted, affect appropriate.   Skin: Warm and dry to the touch, no apparent skin lesions or masses noted.   Psychiatric: Normal mood and affect. Behavior is normal    Procedures    Lab Results   Component Value Date    WBC 8.34 04/25/2023    HGB 14.6 04/25/2023    HCT 42.2 04/25/2023    MCV 92.7 04/25/2023     " 04/25/2023     Lab Results   Component Value Date    GLUCOSE 108 (H) 04/25/2023    BUN 20 04/25/2023    CREATININE 1.02 04/25/2023    EGFRIFNONA 82 03/02/2021    BCR 19.6 04/25/2023    CO2 26.0 04/25/2023    CALCIUM 8.9 04/25/2023    ALBUMIN 3.9 03/17/2023    AST 18 03/17/2023    ALT 14 03/17/2023     Lab Results   Component Value Date    CHOL 195 03/14/2023    CHOL 191 03/08/2022    CHOL 196 03/02/2021     Lab Results   Component Value Date    TRIG 98 03/14/2023    TRIG 99 03/08/2022    TRIG 110 03/02/2021     Lab Results   Component Value Date    HDL 49 03/14/2023    HDL 49 03/08/2022    HDL 43 03/02/2021     No components found for: LDLCALC  Lab Results   Component Value Date     (H) 03/14/2023     (H) 03/08/2022     (H) 03/02/2021     No results found for: HDLLDLRATIO  No components found for: CHOLHDL  Lab Results   Component Value Date    HGBA1C 5.30 03/14/2023     Lab Results   Component Value Date    TSH 3.350 03/14/2023           ASSESSMENT AND PLAN:  Symptomatic paroxysmal AV block s/p pacemaker  Patient is significant pulmonary quality of life.  He is a pleased with clinical outcome.  He will continue follow-up with the patient      #2 hyperlipidemia  Continue statin    #3 dilated ascending aorta follows with Dr. Silverman   Continue statin continue metoprolol  Abnormal stress test evaluated with Dr. Serrano waiting for cardiac catheterization   #4 nonobstructive CAD  Continue aspirin and statin low-dose metoprolol asymptomatic at the time of    I spent 16 minutes caring for Ayan on this date of service. This time includes time spent by me of counseling/coordination of care as relates to the presenting problem and any ordered procedures/tests as outlined above.         This document has been electronically signed by Win Martinez MD on June 13, 2023 15:12 CDT        Diagnoses and all orders for this visit:    1. Mixed hyperlipidemia (Primary)    2. Coronary artery disease involving  native coronary artery of native heart without angina pectoris -Dr. Serrano    3. Essential hypertension    4. Aneurysm of ascending aorta without rupture    5. AV block, Mobitz II          Win Martinez MD  6/13/2023  15:12 CDT

## 2023-06-13 NOTE — PROGRESS NOTES
Subjective   Ayan Shipman is a 62 y.o. male.     History of Present Illness   Patient has been followed with symptoms of nasal obstruction.  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  Did not improve with medical therapy and therefore CT scan was ordered.  This was obtained today and is personally reviewed.  There is a left-sided intranasal mass that actually does not appear to originate from the maxillary sinus.  There is mucosal thickening and a possible layer of fluid in the left maxillary sinus and the left septal deformity is noted.  The nasal mass may be arising from the lateral nasal wall the septum or possibly even the area of the olfactory cleft/face of the sphenoid area.  Patient reports he still cannot breathe well through his left nostril.  The following portions of the patient's history were reviewed and updated as appropriate: allergies, current medications, past family history, past medical history, past social history, past surgical history and problem list.      Ayan Shipman reports that he has never smoked. He has never been exposed to tobacco smoke. He has never used smokeless tobacco. He reports that he does not drink alcohol and does not use drugs.  Patient is not a tobacco user and has not been counseled for use of tobacco products    Family History   Problem Relation Age of Onset    Diabetes Other     Heart disease Other     Hypertension Other     Arthritis Other     Stroke Mother     Arthritis Mother     Hyperlipidemia Mother     Hypertension Mother     Osteoarthritis Mother     ALS Father     Early death Father         ALS    Diabetes Brother     Heart disease Brother     Cancer Brother         Skin    Diabetes Brother     Heart disease Brother        No Known Allergies      Current Outpatient Medications:     acetaminophen (TYLENOL) 500 MG tablet, Take 1 tablet by mouth Every 6 (Six) Hours As Needed for Mild Pain., Disp: , Rfl:      Ascorbic Acid (VITAMIN C PO), Take  by mouth., Disp: , Rfl:     aspirin 81 MG EC tablet, Take 1 tablet by mouth Daily., Disp: , Rfl:     Coenzyme Q-10 100 MG capsule, Take 1 capsule by mouth Daily., Disp: , Rfl:     ezetimibe (Zetia) 10 MG tablet, Take 1 tablet by mouth Daily. For cholesterol, Disp: 90 tablet, Rfl: 3    meloxicam (MOBIC) 15 MG tablet, Take 1 tablet by mouth Daily., Disp: , Rfl:     metoprolol succinate XL (TOPROL-XL) 25 MG 24 hr tablet, Take 1 tablet by mouth Daily., Disp: 90 tablet, Rfl: 3    Nutritional Supplements (VITAMIN D BOOSTER PO), Take  by mouth., Disp: , Rfl:     rosuvastatin (Crestor) 10 MG tablet, Take 1 tablet by mouth Every Other Day., Disp: 45 tablet, Rfl: 3    zinc sulfate (ZINCATE) 220 (50 Zn) MG capsule, Take 1 capsule by mouth Daily., Disp: , Rfl:     Zoster Vac Recomb Adjuvanted 50 MCG/0.5ML reconstituted suspension, Inject 0.5 mL into the appropriate muscle as directed by prescriber Every 2 (Two) Months., Disp: 1 each, Rfl: 1    Past Medical History:   Diagnosis Date    Aortic aneurysm     Asthma 2019    Restricted Left Nostril    Class 1 obesity due to excess calories with serious comorbidity and body mass index (BMI) of 30.0 to 30.9 in adult 03/08/2021    Erectile dysfunction 02/26/2019    GERD (gastroesophageal reflux disease)     Hammertoes of both feet 03/08/2021    Hemorrhoids     Hypercholesterolemia     Impaired fasting glucose 03/08/2021    Impotence     Overweight (BMI 25.0-29.9) 02/26/2019    Palpitations     Primary osteoarthritis involving multiple joints 10/08/2021    Primary osteoarthritis of both knees 02/26/2019    Shoulder pain     Sprain of shoulder and upper arm - right; improved          Past Surgical History:   Procedure Laterality Date    CARDIAC CATHETERIZATION N/A 4/25/2023    Procedure: Left Heart Cath;  Surgeon: Christianne Serrano MD;  Location: [facility] CATH INVASIVE LOCATION;  Service: Cardiology;  Laterality: N/A;    CARDIAC ELECTROPHYSIOLOGY  PROCEDURE Left 03/16/2023    Procedure: Pacemaker DC new;  Surgeon: Win Martinez MD;  Location: [facility] CATH INVASIVE LOCATION;  Service: Cardiology;  Laterality: Left;    CARDIAC SURGERY  3/16/23    Pacemaker    COLONOSCOPY N/A 07/02/2021    Procedure: COLONOSCOPY;  Surgeon: Kyle Thakur DO;  Location: [facility] ENDOSCOPY;  Service: Gastroenterology;  Laterality: N/A;    ENDOSCOPY AND COLONOSCOPY  03/17/2010    Internal grade I hemorrhoids found in the anus    HERNIA REPAIR      INJECTION OF MEDICATION  07/08/2013    Kenalog (3)       KNEE SURGERY           Review of Systems   Constitutional: Negative for fever.   HENT: Positive for congestion.            Objective   Physical Exam  Ears: External ears no deformity, canals no discharge, tympanic membranes intact clear and mobile bilaterally.  Nares septal deformity to the left.  Polyp had been seen previously on nasal endoscopy and this is not repeated today.  Oral cavity no masses or lesions  Pharynx no erythema exudate or mass  Neck no adenopathy or mass           Assessment and Plan   Diagnoses and all orders for this visit:    1. Nasal cavity mass (Primary)  -     Case Request; Standing  -     oxymetazoline (AFRIN) nasal spray 2 spray  -     Case Request    2. Nasal septal deformity  -     Case Request; Standing  -     oxymetazoline (AFRIN) nasal spray 2 spray  -     Case Request    3. Chronic maxillary sinusitis  -     Case Request; Standing  -     oxymetazoline (AFRIN) nasal spray 2 spray  -     Case Request    Other orders  -     Follow Anesthesia Guidelines / Protocol; Future  -     Obtain Informed Consent; Future  -     Follow Anesthesia Guidelines / Protocol; Standing  -     Obtain Informed Consent; Standing  -     SCD (Sequential Compression Device) - To Be Placed on Patient in Pre-Op; Standing             Plan: Recommended endoscopic excision of the left nasal mass along with nasal septoplasty and left maxillary sinus antrostomy.  I explained the  "nature of these procedures to the patient in layman's terms including need for general anesthetic and risks including bleeding, infection, poor healing, recurrence of the polyp, failure to improve symptoms, hole in the nasal septum, numbness of the front teeth and palate.  Proposed benefit would be definitive diagnosis and hopefully definitive treatment of the left-sided nasal cavity mass along with improved nasal airflow and resolution of chronic maxillary sinusitis.  The alternative would be observation.  Patient voiced understanding of all the above and wished to proceed.  This is scheduled.      Addendum: Radiologist report on the patient CT scan states that there is \"no evidence of maxillary sinusitis\".  I disagree with this as there is clearly mucosal thickening in the floor the left maxillary sinus.  Radiologist also reported \"polypoid degeneration of the turbinates\".  Based on direct visualization this is actually an intranasal mass not just polypoid degeneration of the turbinates.  "

## 2023-07-05 PROBLEM — I49.5 SICK SINUS SYNDROME: Status: ACTIVE | Noted: 2023-07-05

## 2023-08-27 ENCOUNTER — ANESTHESIA EVENT (OUTPATIENT)
Dept: PERIOP | Facility: HOSPITAL | Age: 63
End: 2023-08-27
Payer: COMMERCIAL

## 2023-08-28 ENCOUNTER — HOSPITAL ENCOUNTER (OUTPATIENT)
Facility: HOSPITAL | Age: 63
Setting detail: HOSPITAL OUTPATIENT SURGERY
Discharge: HOME | End: 2023-08-28
Attending: OTOLARYNGOLOGY | Admitting: OTOLARYNGOLOGY
Payer: COMMERCIAL

## 2023-08-28 ENCOUNTER — ANESTHESIA (OUTPATIENT)
Dept: PERIOP | Facility: HOSPITAL | Age: 63
End: 2023-08-28
Payer: COMMERCIAL

## 2023-08-28 VITALS
HEART RATE: 57 BPM | SYSTOLIC BLOOD PRESSURE: 128 MMHG | OXYGEN SATURATION: 99 % | DIASTOLIC BLOOD PRESSURE: 82 MMHG | HEIGHT: 71 IN | WEIGHT: 207.89 LBS | BODY MASS INDEX: 29.1 KG/M2 | TEMPERATURE: 96.9 F | RESPIRATION RATE: 18 BRPM

## 2023-08-28 DIAGNOSIS — J32.0 CHRONIC MAXILLARY SINUSITIS: ICD-10-CM

## 2023-08-28 DIAGNOSIS — J34.89 NASAL CAVITY MASS: ICD-10-CM

## 2023-08-28 DIAGNOSIS — J34.2 NASAL SEPTAL DEFORMITY: ICD-10-CM

## 2023-08-28 PROCEDURE — 25010000002 MIDAZOLAM PER 1 MG: Performed by: NURSE ANESTHETIST, CERTIFIED REGISTERED

## 2023-08-28 PROCEDURE — 31256 EXPLORATION MAXILLARY SINUS: CPT | Performed by: OTOLARYNGOLOGY

## 2023-08-28 PROCEDURE — 25010000002 NEOSTIGMINE 10 MG/10ML SOLUTION: Performed by: NURSE ANESTHETIST, CERTIFIED REGISTERED

## 2023-08-28 PROCEDURE — 25010000002 PROPOFOL 200 MG/20ML EMULSION: Performed by: NURSE ANESTHETIST, CERTIFIED REGISTERED

## 2023-08-28 PROCEDURE — 25010000002 DEXAMETHASONE PER 1 MG: Performed by: NURSE ANESTHETIST, CERTIFIED REGISTERED

## 2023-08-28 PROCEDURE — 25010000002 PHENYLEPHRINE 10 MG/ML SOLUTION: Performed by: NURSE ANESTHETIST, CERTIFIED REGISTERED

## 2023-08-28 PROCEDURE — 25010000002 FENTANYL CITRATE (PF) 100 MCG/2ML SOLUTION: Performed by: NURSE ANESTHETIST, CERTIFIED REGISTERED

## 2023-08-28 PROCEDURE — 25010000002 ONDANSETRON PER 1 MG: Performed by: NURSE ANESTHETIST, CERTIFIED REGISTERED

## 2023-08-28 PROCEDURE — 30117 REMOVAL OF INTRANASAL LESION: CPT | Performed by: OTOLARYNGOLOGY

## 2023-08-28 PROCEDURE — 30520 REPAIR OF NASAL SEPTUM: CPT | Performed by: OTOLARYNGOLOGY

## 2023-08-28 RX ORDER — LIDOCAINE HYDROCHLORIDE AND EPINEPHRINE BITARTRATE 20; .01 MG/ML; MG/ML
INJECTION, SOLUTION SUBCUTANEOUS AS NEEDED
Status: DISCONTINUED | OUTPATIENT
Start: 2023-08-28 | End: 2023-08-28 | Stop reason: HOSPADM

## 2023-08-28 RX ORDER — LIDOCAINE HYDROCHLORIDE 20 MG/ML
INJECTION, SOLUTION EPIDURAL; INFILTRATION; INTRACAUDAL; PERINEURAL AS NEEDED
Status: DISCONTINUED | OUTPATIENT
Start: 2023-08-28 | End: 2023-08-28 | Stop reason: SURG

## 2023-08-28 RX ORDER — MIDAZOLAM HYDROCHLORIDE 1 MG/ML
INJECTION INTRAMUSCULAR; INTRAVENOUS AS NEEDED
Status: DISCONTINUED | OUTPATIENT
Start: 2023-08-28 | End: 2023-08-28 | Stop reason: SURG

## 2023-08-28 RX ORDER — ONDANSETRON 2 MG/ML
INJECTION INTRAMUSCULAR; INTRAVENOUS AS NEEDED
Status: DISCONTINUED | OUTPATIENT
Start: 2023-08-28 | End: 2023-08-28 | Stop reason: SURG

## 2023-08-28 RX ORDER — ROCURONIUM BROMIDE 10 MG/ML
INJECTION, SOLUTION INTRAVENOUS AS NEEDED
Status: DISCONTINUED | OUTPATIENT
Start: 2023-08-28 | End: 2023-08-28 | Stop reason: SURG

## 2023-08-28 RX ORDER — ONDANSETRON 2 MG/ML
4 INJECTION INTRAMUSCULAR; INTRAVENOUS ONCE AS NEEDED
Status: DISCONTINUED | OUTPATIENT
Start: 2023-08-28 | End: 2023-08-28 | Stop reason: HOSPADM

## 2023-08-28 RX ORDER — PHENYLEPHRINE HCL IN 0.9% NACL 0.5 MG/5ML
SYRINGE (ML) INTRAVENOUS AS NEEDED
Status: DISCONTINUED | OUTPATIENT
Start: 2023-08-28 | End: 2023-08-28 | Stop reason: SURG

## 2023-08-28 RX ORDER — OXYMETAZOLINE HYDROCHLORIDE 0.05 G/100ML
SPRAY NASAL AS NEEDED
Status: DISCONTINUED | OUTPATIENT
Start: 2023-08-28 | End: 2023-08-28 | Stop reason: HOSPADM

## 2023-08-28 RX ORDER — DEXAMETHASONE SODIUM PHOSPHATE 4 MG/ML
INJECTION, SOLUTION INTRA-ARTICULAR; INTRALESIONAL; INTRAMUSCULAR; INTRAVENOUS; SOFT TISSUE AS NEEDED
Status: DISCONTINUED | OUTPATIENT
Start: 2023-08-28 | End: 2023-08-28 | Stop reason: SURG

## 2023-08-28 RX ORDER — EPHEDRINE SULFATE 50 MG/ML
INJECTION INTRAVENOUS AS NEEDED
Status: DISCONTINUED | OUTPATIENT
Start: 2023-08-28 | End: 2023-08-28 | Stop reason: SURG

## 2023-08-28 RX ORDER — OXYMETAZOLINE HYDROCHLORIDE 0.05 G/100ML
2 SPRAY NASAL
Status: DISCONTINUED | OUTPATIENT
Start: 2023-08-28 | End: 2023-08-28 | Stop reason: HOSPADM

## 2023-08-28 RX ORDER — NEOSTIGMINE METHYLSULFATE 1 MG/ML
INJECTION, SOLUTION INTRAVENOUS AS NEEDED
Status: DISCONTINUED | OUTPATIENT
Start: 2023-08-28 | End: 2023-08-28 | Stop reason: SURG

## 2023-08-28 RX ORDER — SODIUM CHLORIDE, SODIUM GLUCONATE, SODIUM ACETATE, POTASSIUM CHLORIDE AND MAGNESIUM CHLORIDE 526; 502; 368; 37; 30 MG/100ML; MG/100ML; MG/100ML; MG/100ML; MG/100ML
1000 INJECTION, SOLUTION INTRAVENOUS CONTINUOUS PRN
Status: DISCONTINUED | OUTPATIENT
Start: 2023-08-28 | End: 2023-08-28 | Stop reason: HOSPADM

## 2023-08-28 RX ORDER — CEFUROXIME AXETIL 250 MG/1
250 TABLET ORAL 2 TIMES DAILY
Qty: 14 TABLET | Refills: 0 | Status: SHIPPED | OUTPATIENT
Start: 2023-08-28 | End: 2023-09-05

## 2023-08-28 RX ORDER — PROPOFOL 10 MG/ML
INJECTION, EMULSION INTRAVENOUS AS NEEDED
Status: DISCONTINUED | OUTPATIENT
Start: 2023-08-28 | End: 2023-08-28 | Stop reason: SURG

## 2023-08-28 RX ORDER — OXYCODONE HYDROCHLORIDE AND ACETAMINOPHEN 5; 325 MG/1; MG/1
1 TABLET ORAL EVERY 6 HOURS PRN
Qty: 24 TABLET | Refills: 0 | Status: SHIPPED | OUTPATIENT
Start: 2023-08-28 | End: 2023-09-11

## 2023-08-28 RX ORDER — ONDANSETRON 4 MG/1
4 TABLET, ORALLY DISINTEGRATING ORAL EVERY 8 HOURS PRN
Qty: 10 TABLET | Refills: 1 | Status: SHIPPED | OUTPATIENT
Start: 2023-08-28

## 2023-08-28 RX ORDER — OXYCODONE HYDROCHLORIDE AND ACETAMINOPHEN 5; 325 MG/1; MG/1
1 TABLET ORAL ONCE AS NEEDED
Status: DISCONTINUED | OUTPATIENT
Start: 2023-08-28 | End: 2023-08-28 | Stop reason: HOSPADM

## 2023-08-28 RX ORDER — FENTANYL CITRATE 50 UG/ML
INJECTION, SOLUTION INTRAMUSCULAR; INTRAVENOUS AS NEEDED
Status: DISCONTINUED | OUTPATIENT
Start: 2023-08-28 | End: 2023-08-28 | Stop reason: SURG

## 2023-08-28 RX ADMIN — LIDOCAINE HYDROCHLORIDE 100 MG: 20 INJECTION, SOLUTION EPIDURAL; INFILTRATION; INTRACAUDAL; PERINEURAL at 10:25

## 2023-08-28 RX ADMIN — EPHEDRINE SULFATE 10 MG: 50 INJECTION INTRAVENOUS at 11:07

## 2023-08-28 RX ADMIN — Medication 100 MCG: at 11:07

## 2023-08-28 RX ADMIN — OXYMETAZOLINE HYDROCHLORIDE 2 SPRAY: 0.05 SPRAY NASAL at 08:02

## 2023-08-28 RX ADMIN — FENTANYL CITRATE 100 MCG: 50 INJECTION, SOLUTION INTRAMUSCULAR; INTRAVENOUS at 10:22

## 2023-08-28 RX ADMIN — Medication 100 MCG: at 11:02

## 2023-08-28 RX ADMIN — Medication 50 MCG: at 10:54

## 2023-08-28 RX ADMIN — GLYCOPYRROLATE 0.6 MCG: 0.2 INJECTION, SOLUTION INTRAMUSCULAR; INTRAVITREAL at 11:22

## 2023-08-28 RX ADMIN — SODIUM CHLORIDE, SODIUM GLUCONATE, SODIUM ACETATE, POTASSIUM CHLORIDE AND MAGNESIUM CHLORIDE 1000 ML: 526; 502; 368; 37; 30 INJECTION, SOLUTION INTRAVENOUS at 08:02

## 2023-08-28 RX ADMIN — MIDAZOLAM HYDROCHLORIDE 2 MG: 1 INJECTION, SOLUTION INTRAMUSCULAR; INTRAVENOUS at 10:18

## 2023-08-28 RX ADMIN — PROPOFOL 200 MG: 10 INJECTION, EMULSION INTRAVENOUS at 10:25

## 2023-08-28 RX ADMIN — NEOSTIGMINE METHYLSULFATE 3 MG: 0.5 INJECTION INTRAVENOUS at 11:22

## 2023-08-28 RX ADMIN — EPHEDRINE SULFATE 10 MG: 50 INJECTION INTRAVENOUS at 10:53

## 2023-08-28 RX ADMIN — ROCURONIUM BROMIDE 50 MG: 10 INJECTION INTRAVENOUS at 10:25

## 2023-08-28 RX ADMIN — DEXAMETHASONE SODIUM PHOSPHATE 4 MG: 4 INJECTION, SOLUTION INTRAMUSCULAR; INTRAVENOUS at 11:20

## 2023-08-28 RX ADMIN — ONDANSETRON 4 MG: 2 INJECTION INTRAMUSCULAR; INTRAVENOUS at 11:20

## 2023-08-28 NOTE — OP NOTE
PREOPERATIVE DIAGNOSES:   Left intranasal mass.   Nasal septal deformity.   Chronic left maxillary sinusitis.     POSTOPERATIVE DIAGNOSES:   Left intranasal mass.   Nasal septal deformity.   Chronic left maxillary sinusitis.     PROCEDURES PERFORMED:   Nasal septoplasty.   Endoscopic left maxillary sinus antrostomy.   Excision of left intranasal mass.     SURGEON: Anders Boyce MD    ANESTHESIA: General endotracheal.    ESTIMATED BLOOD LOSS: Minimal.     FLUIDS: Crystalloid.     SPECIMENS:   Septal fragments with sinus tissue.   Left nasal mass.     COMPLICATIONS: None.     INDICATIONS FOR PROCEDURE: This is a 62-year-old male with a left-sided nasal mass that was thought to possibly be an antrochoanal polyp versus intranasal neoplasm. Also has an angulated septal deformity and evidence of chronic left maxillary sinusitis likely due to obstruction from the mass.      FINDINGS: Angulated septal deformity to the left, lobulated intranasal mass appearing to arise from the superior turbinate, maxillary antrum enlarged surgically.     DESCRIPTION OF PROCEDURE: The patient was taken to the operating room and placed in the supine position. After the satisfactory induction of general endotracheal anesthesia, Afrin on pledgets were placed in the nares bilaterally and the face was draped in the usual fashion. Using a headlight for visualization, the Afrin-soaked pledgets were removed and 2% Xylocaine with epinephrine was infiltrated into the mucosa of the septum bilaterally. A [place] incision was made on the left and a mucoperichondrial flap was elevated back beyond the bony cartilaginous junction. Cartilaginous incision was then made posterior to the mucosal incision using a Free elevator. Contralateral mucoperichondrial flap was also elevated back beyond the bony cartilaginous junction using a Free elevator. Cartilaginous incision was extended inferiorly down to the nasal spine. Septal cartilage was then elevated off  the nasal spine from anterior to posterior back to the bony cartilaginous junction. Bony cartilaginous junction was then  from inferior to superior and the cartilaginous incision was brought anteriorly to join the previous incision allowing a deflected piece of quadrangular cartilage to be removed while leaving a continuous dorsal and columellar strut in place. Mucosal flaps were then elevated off the bony septum and the nasal spine and angulated areas were resected using Reji forceps. At this point the patient's neck was flexed and the 0-degree scope was used to visualize the nares. Visualization of the right naris confirmed no evidence of mass, polyp, or purulence. On the left side the intranasal mass was noted. It appeared to be displacing the middle turbinate laterally. Dissection was begun using the microdebrider and eventually it became apparent that the mass was arising from the superior turbinate and therefore the stalk was able to be amputated and the mass was largely able to removed intact. A small amount of residual tissue was resected from the anterior and inferior aspect of the superior turbinate using the microdebrider. There appeared to be no other abnormal tissue intranasally. At this point the left middle turbinate was subluxed medially with a Lewis elevator and the uncinate process was infiltrated with 1% Xylocaine with epinephrine. The uncinate was incised superior to inferior along its anterior attachment using a sickle knife and the base of the uncinate was amputated with scissors and the uncinate was resected with straight-biting forceps.  A ball-tip probe was used to cannulate the natural sinus ostium and this natural sinus ostium was then enlarged posteriorly and inferiorly first bluntly and then further with the microdebrider. Curved suction was passed into the maxillary sinus and a modest amount of mucoid secretions were evacuated with suction. Afrin-soaked pledget was placed  in the middle meatal cleft. The patient's head was returned to the neutral position and using a headlight for visualization the septoplasty incision was again examined. A piece of previously resected cartilage was trimmed, morcellized, and replaced between the mucosal flaps. The [place] incision was closed with interrupted 5-0 Vicryl. A running through-and-through mattress suture of 4-0 plain gut was used to transfix the reimplanted cartilage and reapproximate the mucosal flaps to one another. The septal mucosa was inspected.  There were noted to be no through-and-through defects. The Afrin-soaked pledget was removed from the left middle meatal cleft and Mupirocin-coated Telfa pads were placed in the nares bilaterally. The procedure was terminated. The patient tolerated the procedure well and went to the recovery room in satisfactory condition.

## 2023-08-28 NOTE — H&P
Subjective      Ayan Shipman is a 62 y.o. male.      History of Present Illness   Patient has been followed with symptoms of nasal obstruction.  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  Did not improve with medical therapy and therefore CT scan was ordered.  This was obtained today and is personally reviewed.  There is a left-sided intranasal mass that actually does not appear to originate from the maxillary sinus.  There is mucosal thickening and a possible layer of fluid in the left maxillary sinus and the left septal deformity is noted.  The nasal mass may be arising from the lateral nasal wall the septum or possibly even the area of the olfactory cleft/face of the sphenoid area.  Patient reports he still cannot breathe well through his left nostril.  The following portions of the patient's history were reviewed and updated as appropriate: allergies, current medications, past family history, past medical history, past social history, past surgical history and problem list.        Ayan Shipman reports that he has never smoked. He has never been exposed to tobacco smoke. He has never used smokeless tobacco. He reports that he does not drink alcohol and does not use drugs.  Patient is not a tobacco user and has not been counseled for use of tobacco products           Family History   Problem Relation Age of Onset    Diabetes Other      Heart disease Other      Hypertension Other      Arthritis Other      Stroke Mother      Arthritis Mother      Hyperlipidemia Mother      Hypertension Mother      Osteoarthritis Mother      ALS Father      Early death Father           ALS    Diabetes Brother      Heart disease Brother      Cancer Brother           Skin    Diabetes Brother      Heart disease Brother           No Known Allergies        Current Outpatient Medications:     acetaminophen (TYLENOL) 500 MG tablet, Take 1 tablet by mouth Every 6 (Six) Hours As  Needed for Mild Pain., Disp: , Rfl:     Ascorbic Acid (VITAMIN C PO), Take  by mouth., Disp: , Rfl:     aspirin 81 MG EC tablet, Take 1 tablet by mouth Daily., Disp: , Rfl:     Coenzyme Q-10 100 MG capsule, Take 1 capsule by mouth Daily., Disp: , Rfl:     ezetimibe (Zetia) 10 MG tablet, Take 1 tablet by mouth Daily. For cholesterol, Disp: 90 tablet, Rfl: 3    meloxicam (MOBIC) 15 MG tablet, Take 1 tablet by mouth Daily., Disp: , Rfl:     metoprolol succinate XL (TOPROL-XL) 25 MG 24 hr tablet, Take 1 tablet by mouth Daily., Disp: 90 tablet, Rfl: 3    Nutritional Supplements (VITAMIN D BOOSTER PO), Take  by mouth., Disp: , Rfl:     rosuvastatin (Crestor) 10 MG tablet, Take 1 tablet by mouth Every Other Day., Disp: 45 tablet, Rfl: 3    zinc sulfate (ZINCATE) 220 (50 Zn) MG capsule, Take 1 capsule by mouth Daily., Disp: , Rfl:     Zoster Vac Recomb Adjuvanted 50 MCG/0.5ML reconstituted suspension, Inject 0.5 mL into the appropriate muscle as directed by prescriber Every 2 (Two) Months., Disp: 1 each, Rfl: 1     Medical History        Past Medical History:   Diagnosis Date    Aortic aneurysm      Asthma 2019     Restricted Left Nostril    Class 1 obesity due to excess calories with serious comorbidity and body mass index (BMI) of 30.0 to 30.9 in adult 03/08/2021    Erectile dysfunction 02/26/2019    GERD (gastroesophageal reflux disease)      Hammertoes of both feet 03/08/2021    Hemorrhoids      Hypercholesterolemia      Impaired fasting glucose 03/08/2021    Impotence      Overweight (BMI 25.0-29.9) 02/26/2019    Palpitations      Primary osteoarthritis involving multiple joints 10/08/2021    Primary osteoarthritis of both knees 02/26/2019    Shoulder pain       Sprain of shoulder and upper arm - right; improved               Surgical History         Past Surgical History:   Procedure Laterality Date    CARDIAC CATHETERIZATION N/A 4/25/2023     Procedure: Left Heart Cath;  Surgeon: Christianne Serrano MD;  Location: [facility]  CATH INVASIVE LOCATION;  Service: Cardiology;  Laterality: N/A;    CARDIAC ELECTROPHYSIOLOGY PROCEDURE Left 03/16/2023     Procedure: Pacemaker DC new;  Surgeon: Win Martinez MD;  Location: [facility] CATH INVASIVE LOCATION;  Service: Cardiology;  Laterality: Left;    CARDIAC SURGERY   3/16/23     Pacemaker    COLONOSCOPY N/A 07/02/2021     Procedure: COLONOSCOPY;  Surgeon: Kyle Thakur DO;  Location: [facility] ENDOSCOPY;  Service: Gastroenterology;  Laterality: N/A;    ENDOSCOPY AND COLONOSCOPY   03/17/2010     Internal grade I hemorrhoids found in the anus    HERNIA REPAIR        INJECTION OF MEDICATION   07/08/2013     Kenalog (3)       KNEE SURGERY                   Review of Systems   Constitutional: Negative for fever.   HENT: Positive for congestion.                      Objective      Physical Exam  Ears: External ears no deformity, canals no discharge, tympanic membranes intact clear and mobile bilaterally.  Nares septal deformity to the left.  Polyp had been seen previously on nasal endoscopy and this is not repeated today.  Oral cavity no masses or lesions  Pharynx no erythema exudate or mass  Neck no adenopathy or mass                 Assessment      Assessment and Plan   Diagnoses and all orders for this visit:     1. Nasal cavity mass (Primary)  -     Case Request; Standing  -     oxymetazoline (AFRIN) nasal spray 2 spray  -     Case Request     2. Nasal septal deformity  -     Case Request; Standing  -     oxymetazoline (AFRIN) nasal spray 2 spray  -     Case Request     3. Chronic maxillary sinusitis  -     Case Request; Standing  -     oxymetazoline (AFRIN) nasal spray 2 spray  -     Case Request     Other orders  -     Follow Anesthesia Guidelines / Protocol; Future  -     Obtain Informed Consent; Future  -     Follow Anesthesia Guidelines / Protocol; Standing  -     Obtain Informed Consent; Standing  -     SCD (Sequential Compression Device) - To Be Placed on Patient in Pre-Op; Standing          "           Plan: Recommended endoscopic excision of the left nasal mass along with nasal septoplasty and left maxillary sinus antrostomy.  I explained the nature of these procedures to the patient in layman's terms including need for general anesthetic and risks including bleeding, infection, poor healing, recurrence of the polyp, failure to improve symptoms, hole in the nasal septum, numbness of the front teeth and palate.  Proposed benefit would be definitive diagnosis and hopefully definitive treatment of the left-sided nasal cavity mass along with improved nasal airflow and resolution of chronic maxillary sinusitis.  The alternative would be observation.  Patient voiced understanding of all the above and wished to proceed.  This is scheduled.        Addendum: Radiologist report on the patient CT scan states that there is \"no evidence of maxillary sinusitis\".  I disagree with this as there is clearly mucosal thickening in the floor the left maxillary sinus.  Radiologist also reported \"polypoid degeneration of the turbinates\".  Based on direct visualization this is actually an intranasal mass not just polypoid degeneration of the turbinates.        Update 8/28/23 no change in exam or plan       "

## 2023-08-28 NOTE — ANESTHESIA PROCEDURE NOTES
Airway  Urgency: elective    Date/Time: 8/28/2023 10:33 AM  End Time:8/28/2023 10:33 AM  Airway not difficult    General Information and Staff    Patient location during procedure: OR  CRNA/CAA: Bonita Mao CRNA    Indications and Patient Condition  Indications for airway management: airway protection    Preoxygenated: yes  Mask difficulty assessment: 2 - vent by mask + OA or adjuvant +/- NMBA    Final Airway Details  Final airway type: endotracheal airway      Successful airway: ETT  Cuffed: yes   Successful intubation technique: video laryngoscopy  Endotracheal tube insertion site: oral  Blade: Plascencia  Blade size: 3  ETT size (mm): 7.5  Cormack-Lehane Classification: grade I - full view of glottis  Placement verified by: chest auscultation and capnometry   Cuff volume (mL): 8  Measured from: lips  ETT/EBT  to lips (cm): 22  Number of attempts at approach: 1  Assessment: lips, teeth, and gum same as pre-op and atraumatic intubation

## 2023-08-28 NOTE — ANESTHESIA POSTPROCEDURE EVALUATION
Patient: Ayan Shipman    Procedure Summary       Date: 08/28/23 Room / Location: [facility] OR 08 / [facility] OR    Anesthesia Start: 1021 Anesthesia Stop: 1130    Procedure: NASAL SEPTOPLASTY, LEFT MAXILLARY SINUS ANTROSTOMY, EXCISION OF LEFT INTRANASAL MASS (Left: Nose) Diagnosis:       Nasal cavity mass      Nasal septal deformity      Chronic maxillary sinusitis      (Nasal cavity mass [J34.89])      (Nasal septal deformity [J34.2])      (Chronic maxillary sinusitis [J32.0])    Surgeons: Anders Boyce MD Provider: Bonita Mao CRNA    Anesthesia Type: general ASA Status: 3            Anesthesia Type: general    Vitals  No vitals data found for the desired time range.          Post Anesthesia Care and Evaluation    Patient location during evaluation: PACU  Patient participation: complete - patient participated  Level of consciousness: sleepy but conscious  Pain score: 0  Pain management: adequate    Airway patency: patent  Anesthetic complications: No anesthetic complications  PONV Status: none  Cardiovascular status: hemodynamically stable  Respiratory status: room air and spontaneous ventilation  Hydration status: acceptable    Comments: 137/80 62 16 98%

## 2023-08-28 NOTE — ANESTHESIA PREPROCEDURE EVALUATION
Anesthesia Evaluation     Patient summary reviewed and Nursing notes reviewed   no history of anesthetic complications:   NPO Solid Status: > 8 hours  NPO Liquid Status: > 2 hours           Airway   Mallampati: II  TM distance: >3 FB  Neck ROM: full  Possible difficult intubation  Dental - normal exam     Pulmonary     breath sounds clear to auscultation  (-) COPD, asthma, shortness of breath, rhonchi, decreased breath sounds, rales, not a smoker, pulmonary embolism, no home oxygen  Cardiovascular   Exercise tolerance: good (4-7 METS)    ECG reviewed  Patient on routine beta blocker and Beta blocker given within 24 hours of surgery  Rhythm: regular  Rate: normal    (+) pacemaker pacemaker interrogated 1-3 months ago, hypertension less than 2 medications, CAD, dysrhythmias Bradycardia, CHF Diastolic >=55%, hyperlipidemia  (-) past MI, angina, LYNN, murmur, cardiac stents, CABG, DVT, carotid artery disease    ROS comment: EKG 3/14/2023:  Normal sinus rhythm  Septal infarct , age undetermined  Abnormal ECG  No previous ECGs available    TTE 3/15/2023:  ú  Left ventricular ejection fraction appears to be 61 - 65%.  ú  Left ventricular diastolic dysfunction is noted.  ú  There is calcification of the aortic valve.  ú  Estimated right ventricular systolic pressure from tricuspid regurgitation is normal (<35 mmHg).  ú  Mild dilation of the aortic root is present. Moderate dilation of the proximal aorta is present.      Left heart cath 4/25/2023:  Selective coronary angiography:  1. Left Main:   Left main is a large caliber vessel which arises from left coronary cusp and gives rise to LAD and LCX.      FLORENCIO III flow was noted.      2. Left anterior descending coronary artery:  LAD is a large caliber vessel which gives rise to several septal  and diagonal branches as it runs in anterior interventricular groove and wraps around the apex.  Mid LAD has mild irregularities none of which are more than 30%.  Distally the  LAD tapers into a relatively small caliber vessel with moderate tortuosity with no angiographic evidence of obstructive coronary artery disease.  Diagonal 1 is a medium caliber vessel which has mild ostial stenosis approaching 50%.  Diagonal 2 is small in caliber without angiographic evidence of obstructive coronary artery disease.  FLORENCIO III flow was noted     3. Left circumflex coronary artery:  Left circumflex is a medium caliber nondominant vessel which gives rise to several obtuse marginal branches after which it runs a small vessel in the posterior AV groove.  Mid left circumflex has mild luminal irregularities none of which are more than 30%.  OM1 is a medium caliber vessel with high takeoff without angiographic evidence of obstructive coronary artery disease.  OM 2 is also medium in caliber without angiographic evidence of obstructive coronary artery disease.  OM 3 is small in caliber.     FLORENCIO III flow was noted     4. Right Coronary artery:  RCA is a large-caliber dominant vessel which gives rise to several small caliber RV marginal branches and bifurcates into medium caliber RPDA and small caliber RPL.  Proximal RCA has a focal concentric stenosis of approaching 30%.  Mid RCA has mild luminal irregularities none of which are more than 30%.  RPDA is medium in caliber with mild eccentric stenosis about 30% in proximal segment.  RPL is small in caliber with mild ostial stenosis approaching 50%.  FLORENCIO III flow was noted     Conclusion:  1.  Mild nonobstructive coronary artery disease  2.  Normal left-sided filling pressure    Stress test 4/10/2023:    Left ventricular ejection fraction is normal (Calculated EF = 65%).  ú  Diaphragmatic attenuation and GI artifacts are present.  ú  Findings consistent with a normal ECG stress test by EKG criteria  ú  Decreased tracer uptake noted in the inferior wall, lateral wall and anterior wall on stress images.  Since the image quality was poor it was difficult to  establish if this represented true ischemia.  Consider CT angiogram of the coronary arteries or cardiac catheterization if clinically indicated.        Pacemaker interrogation 6/28/2023:  Battery  MONISHA: 10.3-11.5 years        Interrogation Results  Atrial sensing: P wave: >5 mV  Atrial capture: 0.75 V @ 0.5 ms   Atrial lead impedance: 510 ohms  Ventricular sensing: R wave: 4.7  mV  Ventricular capture: 0.75 V @ 0.5 ms  Ventricular lead impedance: right  450 ohms     Parameters  Mode: DDDR  Base Rate: 60/130     Diagnostic Data  Atrial paced: 18 %   Ventricular paced: <1 %  Mode switch: 0%  AT/AF [place]: 0%  AHR: 0  VHR: 3, 2 sec     Intrinsic Rate: 72     Changes made: A cap confirm turned on. PVARP extended to 350 ms for PMT.     Conclusions:  Normal pacemaker function. Follow up in one year, remote every 3 months.     Assessment:  Paroxysmal high-grade AV block     Sick sinus syndrome     Normal functioning pacemake        Neuro/Psych  (+) syncope  (-) seizures, TIA, CVA  GI/Hepatic/Renal/Endo    (-)  obesity, morbid obesity, GERD, no renal disease, diabetes    Musculoskeletal     Abdominal  - normal exam   Substance History - negative use     OB/GYN negative ob/gyn ROS         Other   arthritis,     ROS/Med Hx Other: Chronic maxillary sinusitis    CT maxillofacial 6/13/2023:  IMPRESSION:  Polypoid thickening of the nasal turbinates on the left with associated  obstruction of the left posterior ethmoid air cells. No evidence of maxillary  sinusitis.      Syncopal episode that lead to diagnosis of Sick Sinus syndrome leading to pacemaker 3/2023.     US carotid 11/7/2022:  IMPRESSION:  1. There is no hemodynamically significant stenosis by velocity  criteria.   2. Less than 50% atherosclerotic narrowing at the bilateral  carotid bulb and proximal ICA.    Being followed by Dr. Silverman for ascending aorta aneurysm:  CT angiogram of chest 3/16/2023:  Findings:  No pulmonary embolism.  No mediastinal hematoma.  No  hilar, mediastinal or axillary adenopathy.  Aortic root dilated up to 4.5 cm.  Proximal ascending aorta dilated up to 4.8 cm.  Mid and distal ascending aorta dilated up to 4.4 cm.  No aortic dissection.  No dilatation or aneurysm of the aortic arch or descending  thoracic aorta or imaged abdominal aorta.  No pericardial effusion.  Coronary artery calcifications.  Minimal cardiomegaly.     Linear atelectasis or scarring lower lobes.  No mass or noncalcified nodule.  No pleural effusion.  No pneumothorax.     1.3 cm cyst right lobe of the liver.     No acute osseous abnormality.  Degenerative changes in the thoracic spine.     IMPRESSION:  Conclusion:  Aortic root dilated up to 4.5 cm.  Proximal ascending aorta dilated up to 4.8 cm.  Mid and distal ascending aorta dilated up to 4.4 cm.  No aortic dissection.  Coronary artery calcifications.  Minimal cardiomegaly.                      Anesthesia Plan    ASA 3     general     (Hx of bradycardia (HR this AM=67) please have ephedrine drawn up)  intravenous induction     Anesthetic plan, risks, benefits, and alternatives have been provided, discussed and informed consent has been obtained with: patient.  Pre-procedure education provided  Plan discussed with CRNA.      CODE STATUS:

## 2023-08-28 NOTE — BRIEF OP NOTE
ENDOSCOPIC FUNCTIONAL SINUS SURGERY WITH SEPTOPLASTY AND RESECTION INFERIOR TURBINATES  Progress Note    Ayan Shipman  8/28/2023    Pre-op Diagnosis:   Nasal cavity mass [J34.89]  Nasal septal deformity [J34.2]  Chronic maxillary sinusitis [J32.0]       Post-Op Diagnosis Codes:     * Nasal cavity mass [J34.89]     * Nasal septal deformity [J34.2]     * Chronic maxillary sinusitis [J32.0]    Procedure/CPT® Codes:        Procedure(s):  NASAL SEPTOPLASTY, LEFT MAXILLARY SINUS ANTROSTOMY, EXCISION OF LEFT INTRANASAL MASS              Surgeon(s):  Anders Boyce MD    Anesthesia: General    Staff:   Circulator: Iveth Mckenna RN; Latanya Ray RN  Scrub Person: Brooke Nugent  Assistant: Courtney Pickering  Assistant: Courtney Pickering      Estimated Blood Loss: minimal    Urine Voided: * No values recorded between 8/28/2023 10:20 AM and 8/28/2023 11:20 AM *    Specimens:                Specimens       ID Source Type Tests Collected By Collected At Frozen?    A Nose Tissue TISSUE EXAM, [facility] ([facility])   Anders Boyce MD 8/28/23 1058 No    Description: Left nasal mass    This specimen was not marked as sent.    B Nose Tissue TISSUE EXAM, [facility] ([facility])   Anders Boyce MD 8/28/23 1106 No    Description: Septum fragments and sinue tissue    This specimen was not marked as sent.                  Drains: * No LDAs found *    Findings: Angulated septal deformity to the left; lobulated nasal mass that appeared to arise from the superior turbinate; left maxillary antrum and large surgically        Complications: None        Anders Boyce MD     Date: 8/28/2023  Time: 11:28 CDT

## 2023-08-28 NOTE — INTERVAL H&P NOTE
H&P reviewed. The patient was examined and there are no changes to the H&P.      Temp:  [97.6 °F (36.4 °C)] 97.6 °F (36.4 °C)  Heart Rate:  [67] 67  Resp:  [20] 20  BP: (132)/(74) 132/74

## 2023-08-29 ENCOUNTER — OFFICE VISIT (OUTPATIENT)
Dept: OTOLARYNGOLOGY | Facility: CLINIC | Age: 63
End: 2023-08-29
Payer: COMMERCIAL

## 2023-08-29 VITALS — BODY MASS INDEX: 28.98 KG/M2 | HEIGHT: 71 IN | WEIGHT: 207 LBS

## 2023-08-29 DIAGNOSIS — Z48.813 AFTERCARE FOLLOWING SURGERY OF THE RESPIRATORY SYSTEM: Primary | ICD-10-CM

## 2023-08-29 PROCEDURE — 99024 POSTOP FOLLOW-UP VISIT: CPT | Performed by: OTOLARYNGOLOGY

## 2023-08-29 NOTE — PROGRESS NOTES
Subjective   Ayan Shipman is a 62 y.o. male.       History of Present Illness     Patient is 1 day status post septoplasty, left maxillary antrostomy, and excision of a polypoid mass from the nose.  Is not having any significant bleeding.    The following portions of the patient's history were reviewed and updated as appropriate: allergies, current medications, past family history, past medical history, past social history, past surgical history and problem list.     reports that he has never smoked. He has never been exposed to tobacco smoke. He has never used smokeless tobacco. He reports that he does not drink alcohol and does not use drugs.   Patient is not a tobacco user and has not been counseled for use of tobacco products      Review of Systems        Objective   Physical Exam    Nares show packing in place.  This was removed.  Septal incision is intact.       Assessment and Plan   Diagnoses and all orders for this visit:    1. Aftercare following surgery of the respiratory system (Primary)             Plan: Packing removed.  Begin saline nose spray 2 sprays each nostril at least 4-5 times a day.  No nose blowing.  Return 1 week.  Call for problems.

## 2023-08-30 LAB — REF LAB TEST METHOD: NORMAL

## 2023-09-05 ENCOUNTER — OFFICE VISIT (OUTPATIENT)
Dept: OTOLARYNGOLOGY | Facility: CLINIC | Age: 63
End: 2023-09-05
Payer: COMMERCIAL

## 2023-09-05 VITALS — BODY MASS INDEX: 28.84 KG/M2 | WEIGHT: 206 LBS | OXYGEN SATURATION: 99 % | HEIGHT: 71 IN | HEART RATE: 71 BPM

## 2023-09-05 DIAGNOSIS — Z48.813 AFTERCARE FOLLOWING SURGERY OF THE RESPIRATORY SYSTEM: Primary | ICD-10-CM

## 2023-09-05 PROCEDURE — 99024 POSTOP FOLLOW-UP VISIT: CPT | Performed by: OTOLARYNGOLOGY

## 2023-09-05 NOTE — PROGRESS NOTES
Subjective   Ayan Shipman is a 62 y.o. male.       History of Present Illness   Patient is 8 days status post excision of a left-sided intranasal mass nasal septoplasty and left maxillary antrostomy.  Final pathology on the mass returned benign polyp.  States he is not having any active bleeding and can breathe better through his nose      The following portions of the patient's history were reviewed and updated as appropriate: allergies, current medications, past family history, past medical history, past social history, past surgical history and problem list.     reports that he has never smoked. He has never been exposed to tobacco smoke. He has never used smokeless tobacco. He reports that he does not drink alcohol and does not use drugs.   Patient is not a tobacco user and has not been counseled for use of tobacco products      Review of Systems        Objective   Physical Exam  Everardo-Synephrine and Xylocaine were instilled the nares and the 0 degree scope was passed into the left nostril.  The maxillary antrostomy was patent and crusts were debrided with suction.  Septoplasty incision was intact.         Assessment and Plan   Diagnoses and all orders for this visit:    1. Aftercare following surgery of the respiratory system (Primary)               Plan: Debridement as described above.  Continue saline spray frequently.  Return next Monday, call sooner for problems.

## 2023-09-11 ENCOUNTER — OFFICE VISIT (OUTPATIENT)
Dept: OTOLARYNGOLOGY | Facility: CLINIC | Age: 63
End: 2023-09-11
Payer: COMMERCIAL

## 2023-09-11 VITALS — BODY MASS INDEX: 29.01 KG/M2 | HEIGHT: 71 IN | WEIGHT: 207.2 LBS

## 2023-09-11 DIAGNOSIS — Z48.813 AFTERCARE FOLLOWING SURGERY OF THE RESPIRATORY SYSTEM: Primary | ICD-10-CM

## 2023-09-11 PROCEDURE — 99024 POSTOP FOLLOW-UP VISIT: CPT | Performed by: OTOLARYNGOLOGY

## 2023-09-13 NOTE — PROGRESS NOTES
Subjective   Ayan Shipman is a 62 y.o. male.       History of Present Illness     Patient is now 2 weeks status post excision of a left sided intranasal mass.  Final pathology returned benign polyp.  Also underwent a septoplasty and left maxillary antrostomy.  Reports he is breathing well through his nose and not having any bleeding    The following portions of the patient's history were reviewed and updated as appropriate: allergies, current medications, past family history, past medical history, past social history, past surgical history and problem list.     reports that he has never smoked. He has never been exposed to tobacco smoke. He has never used smokeless tobacco. He reports that he does not drink alcohol and does not use drugs.   Patient is not a tobacco user and has not been counseled for use of tobacco products      Review of Systems        Objective   Physical Exam  Nares: Septal incision intact.  Septal sutures were trimmed and removed.  Everardo-Synephrine and Xylocaine are instilled the nares and the 0 degree scope was used to visualize the nasal passages.  The left maxillary antrostomy is patent without evidence of crusting or discharge.  A crust is removed from the head of the superior turbinate where the polyp originated there is no evidence of recurrence.         Assessment and Plan   Diagnoses and all orders for this visit:    1. Aftercare following surgery of the respiratory system (Primary)           Plan: Debridement as described above.  May resume unrestricted activities.  Follow-up with me as needed.

## 2023-09-15 ENCOUNTER — HOSPITAL ENCOUNTER (OUTPATIENT)
Dept: CT IMAGING | Facility: HOSPITAL | Age: 63
Discharge: HOME | End: 2023-09-15
Admitting: THORACIC SURGERY (CARDIOTHORACIC VASCULAR SURGERY)
Payer: COMMERCIAL

## 2023-09-15 DIAGNOSIS — I71.21 ANEURYSM OF ASCENDING AORTA WITHOUT RUPTURE: ICD-10-CM

## 2023-09-15 PROCEDURE — 71250 CT THORAX DX C-: CPT

## 2023-09-21 ENCOUNTER — OFFICE VISIT (OUTPATIENT)
Dept: CARDIAC SURGERY | Facility: CLINIC | Age: 63
End: 2023-09-21
Payer: COMMERCIAL

## 2023-09-21 VITALS
BODY MASS INDEX: 29.51 KG/M2 | WEIGHT: 210.8 LBS | SYSTOLIC BLOOD PRESSURE: 118 MMHG | HEART RATE: 66 BPM | TEMPERATURE: 98.7 F | DIASTOLIC BLOOD PRESSURE: 78 MMHG | OXYGEN SATURATION: 98 % | HEIGHT: 71 IN

## 2023-09-21 DIAGNOSIS — Z95.0 S/P PLACEMENT OF CARDIAC PACEMAKER: Chronic | ICD-10-CM

## 2023-09-21 DIAGNOSIS — I71.21 ANEURYSM OF ASCENDING AORTA WITHOUT RUPTURE: Primary | Chronic | ICD-10-CM

## 2023-09-21 DIAGNOSIS — E66.3 OVERWEIGHT WITH BODY MASS INDEX (BMI) OF 29 TO 29.9 IN ADULT: ICD-10-CM

## 2023-09-21 DIAGNOSIS — I25.10 CORONARY ARTERY DISEASE INVOLVING NATIVE CORONARY ARTERY OF NATIVE HEART WITHOUT ANGINA PECTORIS: Chronic | ICD-10-CM

## 2023-09-21 DIAGNOSIS — I10 ESSENTIAL HYPERTENSION: Chronic | ICD-10-CM

## 2023-09-21 DIAGNOSIS — E78.2 MIXED HYPERLIPIDEMIA: Chronic | ICD-10-CM

## 2023-09-21 PROCEDURE — 99204 OFFICE O/P NEW MOD 45 MIN: CPT | Performed by: THORACIC SURGERY (CARDIOTHORACIC VASCULAR SURGERY)

## 2023-09-21 NOTE — PROGRESS NOTES
10/23/2023    Ayan Shipman  1960    Chief Complaint:    Chief Complaint   Patient presents with    Aortic Aneurysm       HPI:      PCP:  Mo Marques MD  Cardiology:  Dr Martinez     62 y.o. male with HTN(stable, increased risk stroke, rupture), Hyperlipidemia(stable, increased risk cardiovascular events), and Obesity(uncontrolled, increased risk cardiovascular events)CAD(stable, increase risk cardiovascular events), thoracic aortic aneurysm(new, increase risk rupture).  never smoked.  Asymptomatic thoracic aortic aneurysm.  No TIA stroke amaurosis.  No MI claudication. No other associated signs, symptoms or modifying factors.    9/2023 CT Chest:  ascending thoracic aorta 48mm, arch 29mm, descending 26mm, abdominal 19mm.    3/2023  ECG:  NSR 71, QTc 428  3/2023 Echocardiogram:  EF 60%, LA 31mm, LV 52mm, RVSP 26mmHg.  Trace MR TR.  4/2023 Stress Test:  EF 65%, possible ischemia anteral lateral wall.  4/2023 Cardiac Catheterization:  mild non-obstructive coronary disease.    The following portions of the patient's history were reviewed and updated as appropriate: allergies, current medications, past family history, past medical history, past social history, past surgical history and problem list.  Recent images independently reviewed.  Available laboratory values reviewed.    PMH:  Past Medical History:   Diagnosis Date    Aortic aneurysm     Chronic maxillary sinusitis 06/13/2023    Class 1 obesity due to excess calories with serious comorbidity and body mass index (BMI) of 30.0 to 30.9 in adult 03/08/2021    Coronary artery disease     Dysrhythmia     pacemaker    Erectile dysfunction 02/26/2019    GERD (gastroesophageal reflux disease)     Hammertoes of both feet 03/08/2021    Hemorrhoids     Hypercholesterolemia     Impaired fasting glucose 03/08/2021    Impotence     Overweight (BMI 25.0-29.9) 02/26/2019    Palpitations     Primary osteoarthritis involving multiple joints 10/08/2021    Primary  osteoarthritis of both knees 02/26/2019    Shoulder pain     Sprain of shoulder and upper arm - right; improved        Past Surgical History:   Procedure Laterality Date    CARDIAC CATHETERIZATION N/A 04/25/2023    Procedure: Left Heart Cath;  Surgeon: Christianne Serrano MD;  Location: [facility] CATH INVASIVE LOCATION;  Service: Cardiology;  Laterality: N/A;    CARDIAC ELECTROPHYSIOLOGY PROCEDURE Left 03/16/2023    Procedure: Pacemaker DC new;  Surgeon: Win Martinez MD;  Location: [facility] CATH INVASIVE LOCATION;  Service: Cardiology;  Laterality: Left;    COLONOSCOPY N/A 07/02/2021    Procedure: COLONOSCOPY;  Surgeon: Kyle Thakur DO;  Location: [facility] ENDOSCOPY;  Service: Gastroenterology;  Laterality: N/A;    ENDOSCOPIC FUNCTIONAL SINUS SURGERY (FESS) Left 8/28/2023    Procedure: NASAL SEPTOPLASTY, LEFT MAXILLARY SINUS ANTROSTOMY, EXCISION OF LEFT INTRANASAL MASS;  Surgeon: Anders Boyce MD;  Location: [facility] OR;  Service: ENT;  Laterality: Left;    ENDOSCOPY AND COLONOSCOPY  03/17/2010    Internal grade I hemorrhoids found in the anus    HERNIA REPAIR Right     groin    KNEE SURGERY Left     arthroscopy     Family History   Problem Relation Age of Onset    Diabetes Other     Heart disease Other     Hypertension Other     Arthritis Other     Stroke Mother     Arthritis Mother     Hyperlipidemia Mother     Hypertension Mother     Osteoarthritis Mother     ALS Father     Early death Father         ALS    Diabetes Brother     Heart disease Brother     Cancer Brother         Skin    Diabetes Brother     Heart disease Brother      Social History     Tobacco Use    Smoking status: Never     Passive exposure: Never    Smokeless tobacco: Never   Vaping Use    Vaping Use: Never used   Substance Use Topics    Alcohol use: Never    Drug use: Never       ALLERGIES:  No Known Allergies      MEDICATIONS:    Current Outpatient Medications:     Ascorbic Acid (VITAMIN C PO), Take 1,000 mg by mouth Daily., Disp: , Rfl:  "    aspirin 81 MG EC tablet, Take 1 tablet by mouth Daily., Disp: , Rfl:     Coenzyme Q-10 200 MG capsule, Take 1 capsule by mouth Daily., Disp: , Rfl:     ezetimibe (Zetia) 10 MG tablet, Take 1 tablet by mouth Daily. For cholesterol, Disp: 90 tablet, Rfl: 3    meloxicam (MOBIC) 15 MG tablet, Take 1 tablet by mouth Daily., Disp: , Rfl:     metoprolol succinate XL (TOPROL-XL) 25 MG 24 hr tablet, Take 1 tablet by mouth Daily., Disp: 90 tablet, Rfl: 3    Nutritional Supplements (VITAMIN D BOOSTER PO), Take 5,000 Units by mouth Daily., Disp: , Rfl:     rosuvastatin (Crestor) 10 MG tablet, Take 1 tablet by mouth Every Other Day., Disp: 45 tablet, Rfl: 3    zinc sulfate (ZINCATE) 220 (50 Zn) MG capsule, Take 1 capsule by mouth Daily., Disp: , Rfl:     ondansetron ODT (ZOFRAN-ODT) 4 MG disintegrating tablet, Dissolve 1 tablet on the tongue Every 8 (Eight) Hours As Needed for Nausea or Vomiting. (Patient not taking: Reported on 9/21/2023), Disp: 10 tablet, Rfl: 1    thiamine1 (VITAMIN B1) 250 MG tablet, Take 1 tablet by mouth Daily. (Patient not taking: Reported on 9/21/2023), Disp: , Rfl:     Review of Systems   Review of Systems   Constitutional: Negative for malaise/fatigue and weight loss.   Cardiovascular:  Positive for syncope. Negative for chest pain, claudication and dyspnea on exertion.   Respiratory:  Negative for cough and shortness of breath.    Skin:  Negative for color change and poor wound healing.   Gastrointestinal:  Positive for heartburn.   Neurological:  Negative for dizziness, numbness and weakness.       Physical Exam   Vitals:    09/21/23 1331   BP: 118/78   BP Location: Left arm   Pulse: 66   Temp: 98.7 °F (37.1 °C)   TempSrc: Infrared   SpO2: 98%   Weight: 95.6 kg (210 lb 12.8 oz)   Height: 180.3 cm (71\")     Body surface area is 2.16 meters squared.  Body mass index is 29.4 kg/m².  Physical Exam  Constitutional:       General: He is not in acute distress.     Appearance: He is not ill-appearing. "   HENT:      Right Ear: Hearing normal.      Left Ear: Hearing normal.      Nose: No nasal deformity.      Mouth/Throat:      Dentition: Normal dentition. Does not have dentures.   Cardiovascular:      Rate and Rhythm: Normal rate and regular rhythm.      Pulses:           Carotid pulses are 2+ on the right side and 2+ on the left side.       Radial pulses are 2+ on the right side and 2+ on the left side.        Posterior tibial pulses are 2+ on the right side and 2+ on the left side.      Heart sounds: No murmur heard.  Pulmonary:      Effort: Pulmonary effort is normal.      Breath sounds: Normal breath sounds.   Abdominal:      General: There is no distension.      Palpations: Abdomen is soft. There is no mass.      Tenderness: There is no abdominal tenderness.   Musculoskeletal:         General: No deformity.      Comments: Gait normal.    Skin:     General: Skin is warm and dry.      Coloration: Skin is not pale.      Findings: No erythema.      Comments: No venous staining   Neurological:      Mental Status: He is alert and oriented to person, place, and time.   Psychiatric:         Speech: Speech normal.         Behavior: Behavior is cooperative.         Thought Content: Thought content normal.         Judgment: Judgment normal.         BUN   Date Value Ref Range Status   04/25/2023 20 8 - 23 mg/dL Final     Creatinine   Date Value Ref Range Status   04/25/2023 1.02 0.76 - 1.27 mg/dL Final     eGFR Non  Amer   Date Value Ref Range Status   03/02/2021 82 49 - 113 mL/min/1.73 Final       ASSESSMENT:  Diagnoses and all orders for this visit:    1. Aneurysm of ascending aorta without rupture (Primary)  -     CT Chest Without Contrast Diagnostic; Future    2. Overweight with body mass index (BMI) of 29 to 29.9 in adult    3. Mixed hyperlipidemia    4. Coronary artery disease involving native coronary artery of native heart without angina pectoris -Dr. Serrano    5. Essential hypertension    6. S/P placement  of cardiac pacemaker -3/2023, Dr. Martinez      PLAN:  Detailed discussion with Ayan Shipman regarding situation and options.  Aneurysm ascending thoracic aorta.  Surgical intervention not indicated at this time.  Will plan to follow with interval imaging.  Smoking cessation, lipid management, BP control in 120 range advised.  Discussed symptoms of rupture, details of surgical repair including aortic root replacement, reimplantation of coronary arteries, possible valve replacement, endovascular and open repair.  Risks, benefits discussed.  Understands and wishes to proceed with plan    Return in 1 year with CT Chest without contrast    Return after above studies complete  Obesity Class  0. Increased risk cardiovascular events, sleep and breathing disorders, joint issues, type 2 diabetes mellitus. Options for weight management, heart healthy diet, exercise programs, and associated health risks of obesity discussed.    Recommended regular physical activity, progressive walking program.  Continue current medications as directed.  Will Obtain relevant old records.  Advance Care Planning   ACP discussion was declined by the patient. Patient does not have an advance directive, declines further assistance.     Thank you for the opportunity to participate in this patient's care.    Copy to primary care provider.

## 2023-10-23 PROBLEM — E66.3 OVERWEIGHT WITH BODY MASS INDEX (BMI) OF 29 TO 29.9 IN ADULT: Status: ACTIVE | Noted: 2019-02-26

## (undated) DEVICE — A2000 MULTI-USE SYRINGE KIT, P/N 701277-003KIT CONTENTS: 100ML CONTRAST RESERVOIR AND TUBING WITH CONTRAST SPIKE AND CLAMP: Brand: A2000 MULTI-USE SYRINGE KIT

## (undated) DEVICE — MODEL AT P65, P/N 701554-001KIT CONTENTS: HAND CONTROLLER, 3-WAY HIGH-PRESSURE STOPCOCK WITH ROTATING END AND PREMIUM HIGH-PRESSURE TUBING: Brand: ANGIOTOUCH® KIT

## (undated) DEVICE — SUT VICRYL 5/0 P3 18IN UND VCP493G

## (undated) DEVICE — GW PERIPH GUIDERIGHT STD/EXCHNG/J/TIP SS 0.035IN 5X260CM

## (undated) DEVICE — ELECTRODE,RT,STRESS,FOAM,50PK: Brand: MEDLINE

## (undated) DEVICE — KT INTRO MINISTICK MAX W/GW NITNL/TUNG ECHO 4F 21G 7CM

## (undated) DEVICE — CONTAINER,SPECIMEN,OR STERILE,4OZ: Brand: MEDLINE

## (undated) DEVICE — GLV SURG SENSICARE PI ORTHO SZ6.5 LF STRL

## (undated) DEVICE — KT ANTI FOG W/FLD AND SPNG

## (undated) DEVICE — MODEL BT2000 P/N 700287-012KIT CONTENTS: MANIFOLD WITH SALINE AND CONTRAST PORTS, SALINE TUBING WITH SPIKE AND HAND SYRINGE, TRANSDUCER: Brand: BT2000 AUTOMATED MANIFOLD KIT

## (undated) DEVICE — TBG DECLOG DIEGO ELITE MULTIDEBRIDER ST

## (undated) DEVICE — BLD E/S SINUS DIEGO ELITE BIPOL STD STR/SERR 4MM PK/5

## (undated) DEVICE — RADIFOCUS OPTITORQUE ANGIOGRAPHIC CATHETER: Brand: OPTITORQUE

## (undated) DEVICE — DRSNG TELFA PAD NONADH STR 1S 3X8IN

## (undated) DEVICE — SPONGE,NEURO,0.5"X3",XR,STRL,LF,10/PK: Brand: MEDLINE

## (undated) DEVICE — PK ENT LF 60

## (undated) DEVICE — STERILE POLYISOPRENE POWDER-FREE SURGICAL GLOVES WITH EMOLLIENT COATING: Brand: PROTEXIS

## (undated) DEVICE — PK PM 60

## (undated) DEVICE — CATH DIAG EXPO .056 FR4 6F 100CM

## (undated) DEVICE — BLD BIPOL DIEGO SMR STR STD TYPE A 2MM

## (undated) DEVICE — CANN SMPL SOFTECH BIFLO ETCO2 A/M 7FT

## (undated) DEVICE — EXTENDEVAC ELECTROSURGICAL PENCIL: Brand: DEROYAL

## (undated) DEVICE — PK CATH LAB 60

## (undated) DEVICE — X-DRAPE ABS 12"X17" .25MM LEAD EQUIV STERILE X-RAY SHIELD 10/BOX: Brand: X-DRAPE

## (undated) DEVICE — SUT SILK 2/0 FS BLK 18IN 685G

## (undated) DEVICE — COVER,MAYO STAND,STERILE: Brand: MEDLINE

## (undated) DEVICE — SUT PLAIN 1 4/0 1828H

## (undated) DEVICE — GLIDESHEATH SLENDER STAINLESS STEEL KIT: Brand: GLIDESHEATH SLENDER